# Patient Record
Sex: MALE | Race: WHITE | NOT HISPANIC OR LATINO | Employment: OTHER | URBAN - METROPOLITAN AREA
[De-identification: names, ages, dates, MRNs, and addresses within clinical notes are randomized per-mention and may not be internally consistent; named-entity substitution may affect disease eponyms.]

---

## 2017-01-05 ENCOUNTER — APPOINTMENT (OUTPATIENT)
Dept: LAB | Facility: CLINIC | Age: 57
End: 2017-01-05
Payer: COMMERCIAL

## 2017-01-05 DIAGNOSIS — E78.5 HYPERLIPIDEMIA: ICD-10-CM

## 2017-01-05 LAB
ALBUMIN SERPL BCP-MCNC: 3.9 G/DL (ref 3.5–5)
ALP SERPL-CCNC: 69 U/L (ref 46–116)
ALT SERPL W P-5'-P-CCNC: 39 U/L (ref 12–78)
ANION GAP SERPL CALCULATED.3IONS-SCNC: 6 MMOL/L (ref 4–13)
AST SERPL W P-5'-P-CCNC: 18 U/L (ref 5–45)
BILIRUB SERPL-MCNC: 1.6 MG/DL (ref 0.2–1)
BUN SERPL-MCNC: 20 MG/DL (ref 5–25)
CALCIUM SERPL-MCNC: 8.9 MG/DL (ref 8.3–10.1)
CHLORIDE SERPL-SCNC: 107 MMOL/L (ref 100–108)
CHOLEST SERPL-MCNC: 178 MG/DL (ref 50–200)
CO2 SERPL-SCNC: 28 MMOL/L (ref 21–32)
CREAT SERPL-MCNC: 0.95 MG/DL (ref 0.6–1.3)
GFR SERPL CREATININE-BSD FRML MDRD: >60 ML/MIN/1.73SQ M
GLUCOSE SERPL-MCNC: 100 MG/DL (ref 65–140)
HDLC SERPL-MCNC: 56 MG/DL (ref 40–60)
LDLC SERPL CALC-MCNC: 109 MG/DL (ref 0–100)
POTASSIUM SERPL-SCNC: 3.7 MMOL/L (ref 3.5–5.3)
PROT SERPL-MCNC: 7.2 G/DL (ref 6.4–8.2)
SODIUM SERPL-SCNC: 141 MMOL/L (ref 136–145)
TRIGL SERPL-MCNC: 65 MG/DL

## 2017-01-05 PROCEDURE — 80061 LIPID PANEL: CPT

## 2017-01-05 PROCEDURE — 36415 COLL VENOUS BLD VENIPUNCTURE: CPT

## 2017-01-05 PROCEDURE — 80053 COMPREHEN METABOLIC PANEL: CPT

## 2017-01-17 ENCOUNTER — ALLSCRIPTS OFFICE VISIT (OUTPATIENT)
Dept: OTHER | Facility: OTHER | Age: 57
End: 2017-01-17

## 2017-01-17 DIAGNOSIS — M77.11 LATERAL EPICONDYLITIS OF RIGHT ELBOW: ICD-10-CM

## 2017-01-30 ENCOUNTER — APPOINTMENT (OUTPATIENT)
Dept: PHYSICAL THERAPY | Facility: CLINIC | Age: 57
End: 2017-01-30
Payer: COMMERCIAL

## 2017-01-30 DIAGNOSIS — M77.11 LATERAL EPICONDYLITIS OF RIGHT ELBOW: ICD-10-CM

## 2017-01-30 PROCEDURE — 97161 PT EVAL LOW COMPLEX 20 MIN: CPT

## 2017-01-30 PROCEDURE — 97140 MANUAL THERAPY 1/> REGIONS: CPT

## 2017-02-02 ENCOUNTER — APPOINTMENT (OUTPATIENT)
Dept: PHYSICAL THERAPY | Facility: CLINIC | Age: 57
End: 2017-02-02
Payer: COMMERCIAL

## 2017-02-02 PROCEDURE — 97110 THERAPEUTIC EXERCISES: CPT

## 2017-02-02 PROCEDURE — 97140 MANUAL THERAPY 1/> REGIONS: CPT

## 2017-02-06 ENCOUNTER — APPOINTMENT (OUTPATIENT)
Dept: PHYSICAL THERAPY | Facility: CLINIC | Age: 57
End: 2017-02-06
Payer: COMMERCIAL

## 2017-02-06 PROCEDURE — 97140 MANUAL THERAPY 1/> REGIONS: CPT

## 2017-02-06 PROCEDURE — 97110 THERAPEUTIC EXERCISES: CPT

## 2017-02-07 ENCOUNTER — GENERIC CONVERSION - ENCOUNTER (OUTPATIENT)
Dept: OTHER | Facility: OTHER | Age: 57
End: 2017-02-07

## 2017-02-08 ENCOUNTER — APPOINTMENT (OUTPATIENT)
Dept: PHYSICAL THERAPY | Facility: CLINIC | Age: 57
End: 2017-02-08
Payer: COMMERCIAL

## 2017-02-08 PROCEDURE — 97110 THERAPEUTIC EXERCISES: CPT

## 2017-02-08 PROCEDURE — 97140 MANUAL THERAPY 1/> REGIONS: CPT

## 2017-02-13 ENCOUNTER — APPOINTMENT (OUTPATIENT)
Dept: PHYSICAL THERAPY | Facility: CLINIC | Age: 57
End: 2017-02-13
Payer: COMMERCIAL

## 2017-02-13 PROCEDURE — 97110 THERAPEUTIC EXERCISES: CPT

## 2017-02-13 PROCEDURE — 97140 MANUAL THERAPY 1/> REGIONS: CPT

## 2017-02-16 ENCOUNTER — APPOINTMENT (OUTPATIENT)
Dept: PHYSICAL THERAPY | Facility: CLINIC | Age: 57
End: 2017-02-16
Payer: COMMERCIAL

## 2017-02-16 PROCEDURE — 97140 MANUAL THERAPY 1/> REGIONS: CPT

## 2017-02-16 PROCEDURE — 97110 THERAPEUTIC EXERCISES: CPT

## 2017-02-20 ENCOUNTER — APPOINTMENT (OUTPATIENT)
Dept: PHYSICAL THERAPY | Facility: CLINIC | Age: 57
End: 2017-02-20
Payer: COMMERCIAL

## 2017-02-20 PROCEDURE — 97140 MANUAL THERAPY 1/> REGIONS: CPT

## 2017-02-20 PROCEDURE — 97110 THERAPEUTIC EXERCISES: CPT

## 2017-02-22 ENCOUNTER — APPOINTMENT (OUTPATIENT)
Dept: PHYSICAL THERAPY | Facility: CLINIC | Age: 57
End: 2017-02-22
Payer: COMMERCIAL

## 2017-02-22 PROCEDURE — 97110 THERAPEUTIC EXERCISES: CPT

## 2017-02-22 PROCEDURE — 97140 MANUAL THERAPY 1/> REGIONS: CPT

## 2017-02-27 ENCOUNTER — APPOINTMENT (OUTPATIENT)
Dept: PHYSICAL THERAPY | Facility: CLINIC | Age: 57
End: 2017-02-27
Payer: COMMERCIAL

## 2017-02-27 PROCEDURE — 97110 THERAPEUTIC EXERCISES: CPT

## 2017-02-27 PROCEDURE — 97035 APP MDLTY 1+ULTRASOUND EA 15: CPT

## 2017-02-27 PROCEDURE — 97140 MANUAL THERAPY 1/> REGIONS: CPT

## 2017-03-01 ENCOUNTER — APPOINTMENT (OUTPATIENT)
Dept: PHYSICAL THERAPY | Facility: CLINIC | Age: 57
End: 2017-03-01
Payer: COMMERCIAL

## 2017-03-01 PROCEDURE — 97110 THERAPEUTIC EXERCISES: CPT

## 2017-03-01 PROCEDURE — 97140 MANUAL THERAPY 1/> REGIONS: CPT

## 2017-03-06 ENCOUNTER — APPOINTMENT (OUTPATIENT)
Dept: PHYSICAL THERAPY | Facility: CLINIC | Age: 57
End: 2017-03-06
Payer: COMMERCIAL

## 2017-03-06 PROCEDURE — 97110 THERAPEUTIC EXERCISES: CPT

## 2017-03-06 PROCEDURE — 97140 MANUAL THERAPY 1/> REGIONS: CPT

## 2017-03-08 ENCOUNTER — APPOINTMENT (OUTPATIENT)
Dept: PHYSICAL THERAPY | Facility: CLINIC | Age: 57
End: 2017-03-08
Payer: COMMERCIAL

## 2017-03-08 PROCEDURE — 97140 MANUAL THERAPY 1/> REGIONS: CPT

## 2017-03-08 PROCEDURE — 97110 THERAPEUTIC EXERCISES: CPT

## 2017-03-30 ENCOUNTER — APPOINTMENT (OUTPATIENT)
Dept: LAB | Facility: CLINIC | Age: 57
End: 2017-03-30
Payer: COMMERCIAL

## 2017-03-30 DIAGNOSIS — Z12.5 ENCOUNTER FOR SCREENING FOR MALIGNANT NEOPLASM OF PROSTATE: ICD-10-CM

## 2017-03-30 LAB — PSA SERPL-MCNC: 1.8 NG/ML (ref 0–4)

## 2017-03-30 PROCEDURE — G0103 PSA SCREENING: HCPCS

## 2017-03-30 PROCEDURE — 36415 COLL VENOUS BLD VENIPUNCTURE: CPT

## 2017-04-24 ENCOUNTER — ALLSCRIPTS OFFICE VISIT (OUTPATIENT)
Dept: OTHER | Facility: OTHER | Age: 57
End: 2017-04-24

## 2017-05-25 ENCOUNTER — ALLSCRIPTS OFFICE VISIT (OUTPATIENT)
Dept: OTHER | Facility: OTHER | Age: 57
End: 2017-05-25

## 2017-07-17 DIAGNOSIS — R73.09 OTHER ABNORMAL GLUCOSE: ICD-10-CM

## 2017-07-17 DIAGNOSIS — I10 ESSENTIAL (PRIMARY) HYPERTENSION: ICD-10-CM

## 2017-07-17 DIAGNOSIS — E78.5 HYPERLIPIDEMIA: ICD-10-CM

## 2017-07-17 DIAGNOSIS — R61 GENERALIZED HYPERHIDROSIS: ICD-10-CM

## 2017-07-19 ENCOUNTER — APPOINTMENT (OUTPATIENT)
Dept: LAB | Facility: CLINIC | Age: 57
End: 2017-07-19
Payer: COMMERCIAL

## 2017-07-19 DIAGNOSIS — R73.09 OTHER ABNORMAL GLUCOSE: ICD-10-CM

## 2017-07-19 DIAGNOSIS — I10 ESSENTIAL (PRIMARY) HYPERTENSION: ICD-10-CM

## 2017-07-19 DIAGNOSIS — E78.5 HYPERLIPIDEMIA: ICD-10-CM

## 2017-07-19 LAB
ALBUMIN SERPL BCP-MCNC: 3.8 G/DL (ref 3.5–5)
ALP SERPL-CCNC: 72 U/L (ref 46–116)
ALT SERPL W P-5'-P-CCNC: 33 U/L (ref 12–78)
ANION GAP SERPL CALCULATED.3IONS-SCNC: 6 MMOL/L (ref 4–13)
AST SERPL W P-5'-P-CCNC: 20 U/L (ref 5–45)
BILIRUB SERPL-MCNC: 1.52 MG/DL (ref 0.2–1)
BUN SERPL-MCNC: 20 MG/DL (ref 5–25)
CALCIUM SERPL-MCNC: 8.7 MG/DL (ref 8.3–10.1)
CHLORIDE SERPL-SCNC: 105 MMOL/L (ref 100–108)
CHOLEST SERPL-MCNC: 169 MG/DL (ref 50–200)
CO2 SERPL-SCNC: 29 MMOL/L (ref 21–32)
CREAT SERPL-MCNC: 0.92 MG/DL (ref 0.6–1.3)
EST. AVERAGE GLUCOSE BLD GHB EST-MCNC: 103 MG/DL
GFR SERPL CREATININE-BSD FRML MDRD: >60 ML/MIN/1.73SQ M
GLUCOSE P FAST SERPL-MCNC: 95 MG/DL (ref 65–99)
HBA1C MFR BLD: 5.2 % (ref 4.2–6.3)
HDLC SERPL-MCNC: 49 MG/DL (ref 40–60)
LDLC SERPL CALC-MCNC: 104 MG/DL (ref 0–100)
POTASSIUM SERPL-SCNC: 3.7 MMOL/L (ref 3.5–5.3)
PROT SERPL-MCNC: 7 G/DL (ref 6.4–8.2)
SODIUM SERPL-SCNC: 140 MMOL/L (ref 136–145)
TRIGL SERPL-MCNC: 82 MG/DL

## 2017-07-19 PROCEDURE — 36415 COLL VENOUS BLD VENIPUNCTURE: CPT

## 2017-07-19 PROCEDURE — 80061 LIPID PANEL: CPT

## 2017-07-19 PROCEDURE — 83036 HEMOGLOBIN GLYCOSYLATED A1C: CPT

## 2017-07-19 PROCEDURE — 80053 COMPREHEN METABOLIC PANEL: CPT

## 2017-07-25 ENCOUNTER — ALLSCRIPTS OFFICE VISIT (OUTPATIENT)
Dept: OTHER | Facility: OTHER | Age: 57
End: 2017-07-25

## 2017-10-25 ENCOUNTER — ALLSCRIPTS OFFICE VISIT (OUTPATIENT)
Dept: OTHER | Facility: OTHER | Age: 57
End: 2017-10-25

## 2017-10-26 NOTE — PROGRESS NOTES
Assessment  1  History of malignant melanoma of skin (V10 82) (Z85 820)    Plan  PMH: History of malignant melanoma of skin    · Follow-up PRN Evaluation and Treatment  Follow-up  Status: Complete  Done:  47MGM8570   Ordered; For: PMH: History of malignant melanoma of skin; Ordered By: Isidoro Moore Performed:  Due: 27GIE6687    Discussion/Summary  Discussion Summary:   History of stage IB melanoma, presently 5 years post resection, and without evidence of disease or recurrence  I will therefore see him as needed  He will continue lifelong dermatologic surveillance  Counseling Documentation With Imm: The patient was counseled regarding diagnostic results,-- instructions for management,-- impressions,-- importance of compliance with treatment  Goals and Barriers: The patient has the current Goals: Surveillance  The patent has the current Barriers:   Patient's Capacity to Self-Care: Patient is able to Self-Care  Patient agrees and allows to involve family/caregiver in development of care plan:   Medication SE Review and Pt Understands Tx: The treatment plan was reviewed with the patient/guardian  The patient/guardian understands and agrees with the treatment plan   Understands and agrees with treatment plan: The treatment plan was reviewed with the patient/guardian  The patient/guardian understands and agrees with the treatment plan      Chief Complaint  Chief Complaint Free Text Note Form: Pt here for 6 mo melanoma f/u  No complaints  History of Present Illness  Diagnosis and Staging: Stage Ia melanoma of right elbow History: Wide excision September 2012     Treatment History: Wide excision September 2012   Current Therapy: Observation   Disease Status: QUE   Interval History: Since his last visit here 6 months ago, Mr Raymundo Crawford has had no derm issues to report  He is doing very well, and is 5 years post resection  He has no new complaints        Review of Systems  Complete Male ROS Surg Onc: Constitutional: The patient denies new or recent history of general fatigue, no recent weight loss, no change in appetite  Eyes: No complaints of visual problems, no scleral icterus  ENT: no complaints of ear pain, no hoarseness, no difficulty swallowing,-- no tinnitus-- and-- no new masses in head, oral cavity, or neck  Cardiovascular: No complaints of chest pain, no palpitations, no ankle edema  Respiratory: No complaints of shortness of breath, no cough  Gastrointestinal: No complaints of jaundice, no bloody stools, no pale stools  Genitourinary: No complaints of dysuria, no hematuria, no nocturia, no frequent urination, no urethral discharge  Musculoskeletal: No complaints of weakness, paralysis, joint stiffness or arthralgias,  Integumentary: No complaints of rash, no new lesions  Neurological: No complaints of convulsions, no seizures, no dizziness  Hematologic/Lymphatic: No complaints of easy bruising  ROS Reviewed:   ROS reviewed  Active Problems  1  Abnormal blood chemistry (790 6) (R79 9)   2  Abnormal blood sugar (790 29) (R73 09)   3  Allergic reaction (995 3) (T78 40XA)   4  Benign essential hypertension (401 1) (I10)   5  Benign familial tremor (333 1) (G25 0)   6  Benign Prostatic Hypertrophy Without Urinary Obstruction With Other Lower Urinary Tract   Symptoms (600 00)   7  Biceps tendonitis (726 12) (M75 20)   8  Depression with anxiety (300 4) (F41 8)   9  Elevated bilirubin (277 4) (R17)   10  Epidermal inclusion cyst (706 2) (L72 0)   11  Excessive sweating (780 8) (R61)   12  Fatty liver (571 8) (K76 0)   13  History of colon polyps (V12 72) (Z86 010)   14  History of malignant melanoma of skin (V10 82) (Z85 820)   15  Hyperlipidemia (272 4) (E78 5)   16  Hypokalemia (276 8) (E87 6)   17  Lyme disease (088 81) (A69 20)   18  Need for prophylactic vaccination and inoculation against influenza (V04 81) (Z23)   19  Need for Tdap vaccination (V06 1) (Z23)   20  Neoplasm of skin, benign (216 9) (D23 9)   21  Obstructive sleep apnea (327 23) (G47 33)   22  Oral thrush (112 0) (B37 0)   23  Organic impotence (607 84) (N52 9)   24  Right lateral epicondylitis (726 32) (M77 11)   25  Special screening examination for neoplasm of prostate (V76 44) (Z12 5)   26  Strain of right biceps, initial encounter (840 8) (S46 211A)   27  Tick bite (919 4,E906 4) (W57 XXXA)   28  Tubular adenoma (229 9) (D36 9)    Past Medical History  1  History of Cancer (199 1) (C80 1)   2  History of hypercholesterolemia (V12 29) (Z86 39)   3  History of malignant melanoma of skin (V10 82) (Z85 820)   4  Need for prophylactic vaccination and inoculation against influenza (V04 81) (Z23)    Surgical History  1  History of Colonoscopy (Fiberoptic)   2  History of Excision Of Lesion Upper Extremity Right   · Right Elbow   3  History of Hemorrhoidectomy   4  History of Tonsillectomy With Adenoidectomy  Surgical History Reviewed: The surgical history was reviewed and updated today  Family History  Mother    1  Family history of Diabetes Mellitus (V18 0)   2  Family history of Skin Cancer (V16 8)  Father    3  Family history of Coronary Artery Disease (V17 49)  Family History Reviewed: The family history was reviewed and updated today  Social History   · Being A Social Drinker   · Denied: History of Drug Use   · Never a smoker  Social History Reviewed: The social history was reviewed and updated today  The social history was reviewed and is unchanged  Current Meds   1  AmLODIPine Besylate 5 MG Oral Tablet; one tablet twice a day; Therapy: 38QRN7173 to (Evaluate:92Vjt1130)  Requested for: 32Hjn1507; Last   Rx:77Jxc2550 Ordered   2  EpiPen 2-Sherman 0 3 MG/0 3ML Injection Solution Auto-injector; INJECT 0 3ML   INTRAMUSCULARLY AS DIRECTED; Therapy: 30VVL3995 to (Last Rx:56Gnr1418)  Requested for: 32DGQ2439 Ordered   3   Escitalopram Oxalate 20 MG Oral Tablet; take 1 tablet by mouth daily; Therapy: 11UWL9711 to (Evaluate:76Tgy9113)  Requested for: 73Tqj5890; Last   Rx:51Cts1502 Ordered   4  Rosuvastatin Calcium 5 MG Oral Tablet; Take 1 tablet by mouth daily; Therapy: 34NDH6821 to (Evaluate:29Fjh7067)  Requested for: 01DJL6421; Last   Rx:51Zhr6750 Ordered   5  Vitamin C 1000 MG Oral Tablet; TAKE 1 TABLET DAILY; Therapy: (Recorded:14Apr2015) to Recorded  Medication List Reviewed: The medication list was reviewed and updated today  Allergies  1  CeleBREX CAPS  2  Bee sting    Vitals  Vital Signs    Recorded: 79IRM4621 09:21AM   Temperature 97 9 F   Heart Rate 73   Respiration 14   Systolic 274   Diastolic 88   Height 6 ft 4 in   Weight 232 lb 4 96 oz   BMI Calculated 28 28   BSA Calculated 2 36   O2 Saturation 92     Physical Exam    Constitutional: General appearance: The Patient is well-developed, well-nourished male who appears his stated age in no acute distress  He is pleasant and talkative  HEENT: Conjunctiva and lids: No swelling, erythema, or discharge  -- Sclerae are anicteric  -- External inspection of ears and nose: Normal  -- Otoscopic examination: Tympanic membranes translucent with normal light reflex  Canals patent without erythema  -- Neck is supple without adenopathy  No JVD  Chest: There are bilaterally symmetrical breath sounds  -- The lungs are clear to auscultation  Cardiac: Heart is regular rate  Abdomen: Abdomen is soft, nontender without masses  -- There is no evidence of hepatosplenomegaly  Extremities: There is no clubbing or cyanosis  -- There is no edema  Neuro: Grossly nonfocal  -- Gait is normal  -- The Cranial Nerves II - XII are grossly intact  -- Sensation is intact to light touch  -- Orientation to person, place and time: Normal  -- Mood and affect: Normal     Lymphatic: no evidence of cervical adenopathy bilaterally  -- no evidence of axillary adenopathy bilaterally  Skin: Warm, anicteric  -- well healed right elbow excision site  Health Management  Tubular adenoma   COLONOSCOPY; every 5 years; Last 98VLQ6717; Next Due: 07VLU3807; Active    Future Appointments    Date/Time Provider Specialty Site   01/16/2018 09:30 AM Kasey Jones DO Internal Medicine [facility]   06/06/2018 08:45 AM Audrey Lutz MD Urology [facility] [place]     End of Encounter Meds  1  EpiPen 2-Sherman 0 3 MG/0 3ML Injection Solution Auto-injector; INJECT 0 3ML   INTRAMUSCULARLY AS DIRECTED; Therapy: 06XMI2854 to (Last Rx:94Cko5591)  Requested for: 37SOM2477 Ordered  2  AmLODIPine Besylate 5 MG Oral Tablet; one tablet twice a day; Therapy: 41YBM8394 to (Evaluate:19Ikb1808)  Requested for: 55Uvu3511; Last   Rx:50Zdz7393 Ordered  3  Escitalopram Oxalate 20 MG Oral Tablet (Lexapro); take 1 tablet by mouth daily; Therapy: 85EBJ9795 to (Evaluate:84Vrk2203)  Requested for: 46Yiv2360; Last   Rx:00Pzq7714 Ordered  4  Vitamin C 1000 MG Oral Tablet; TAKE 1 TABLET DAILY; Therapy: (Recorded:99Xkd7293) to Recorded  5  Rosuvastatin Calcium 5 MG Oral Tablet (Crestor); Take 1 tablet by mouth daily;    Therapy: 75RHR2045 to (Yasmine Garces)  Requested for: 06Ljf5897; Last   Rx:38Lar8039 Ordered    Signatures   Electronically signed by : Lon Samuel MD; Oct 25 2017  9:58AM EST                       (Author)

## 2018-01-09 ENCOUNTER — APPOINTMENT (OUTPATIENT)
Dept: LAB | Facility: CLINIC | Age: 58
End: 2018-01-09
Payer: COMMERCIAL

## 2018-01-09 DIAGNOSIS — E78.5 HYPERLIPIDEMIA: ICD-10-CM

## 2018-01-09 DIAGNOSIS — R61 GENERALIZED HYPERHIDROSIS: ICD-10-CM

## 2018-01-09 DIAGNOSIS — K76.0 FATTY (CHANGE OF) LIVER, NOT ELSEWHERE CLASSIFIED: ICD-10-CM

## 2018-01-09 DIAGNOSIS — I10 ESSENTIAL (PRIMARY) HYPERTENSION: ICD-10-CM

## 2018-01-09 DIAGNOSIS — R79.9 ABNORMAL FINDING OF BLOOD CHEMISTRY: ICD-10-CM

## 2018-01-09 DIAGNOSIS — R73.09 OTHER ABNORMAL GLUCOSE: ICD-10-CM

## 2018-01-09 LAB
ALBUMIN SERPL BCP-MCNC: 4 G/DL (ref 3.5–5)
ALP SERPL-CCNC: 98 U/L (ref 46–116)
ALT SERPL W P-5'-P-CCNC: 58 U/L (ref 12–78)
ANION GAP SERPL CALCULATED.3IONS-SCNC: 5 MMOL/L (ref 4–13)
AST SERPL W P-5'-P-CCNC: 27 U/L (ref 5–45)
BILIRUB SERPL-MCNC: 1.62 MG/DL (ref 0.2–1)
BUN SERPL-MCNC: 21 MG/DL (ref 5–25)
CALCIUM SERPL-MCNC: 9.4 MG/DL (ref 8.3–10.1)
CHLORIDE SERPL-SCNC: 104 MMOL/L (ref 100–108)
CHOLEST SERPL-MCNC: 139 MG/DL (ref 50–200)
CO2 SERPL-SCNC: 30 MMOL/L (ref 21–32)
CREAT SERPL-MCNC: 0.92 MG/DL (ref 0.6–1.3)
ERYTHROCYTE [DISTWIDTH] IN BLOOD BY AUTOMATED COUNT: 12.2 % (ref 11.6–15.1)
EST. AVERAGE GLUCOSE BLD GHB EST-MCNC: 105 MG/DL
GFR SERPL CREATININE-BSD FRML MDRD: 91 ML/MIN/1.73SQ M
GLUCOSE P FAST SERPL-MCNC: 97 MG/DL (ref 65–99)
HBA1C MFR BLD: 5.3 % (ref 4.2–6.3)
HCT VFR BLD AUTO: 43.5 % (ref 36.5–49.3)
HDLC SERPL-MCNC: 40 MG/DL (ref 40–60)
HGB BLD-MCNC: 15.7 G/DL (ref 12–17)
LDLC SERPL CALC-MCNC: 78 MG/DL (ref 0–100)
MCH RBC QN AUTO: 31.8 PG (ref 26.8–34.3)
MCHC RBC AUTO-ENTMCNC: 36.1 G/DL (ref 31.4–37.4)
MCV RBC AUTO: 88 FL (ref 82–98)
PLATELET # BLD AUTO: 283 THOUSANDS/UL (ref 149–390)
PMV BLD AUTO: 9.6 FL (ref 8.9–12.7)
POTASSIUM SERPL-SCNC: 4.2 MMOL/L (ref 3.5–5.3)
PROT SERPL-MCNC: 7.7 G/DL (ref 6.4–8.2)
RBC # BLD AUTO: 4.93 MILLION/UL (ref 3.88–5.62)
SODIUM SERPL-SCNC: 139 MMOL/L (ref 136–145)
TRIGL SERPL-MCNC: 107 MG/DL
TSH SERPL DL<=0.05 MIU/L-ACNC: 2.29 UIU/ML (ref 0.36–3.74)
WBC # BLD AUTO: 6.1 THOUSAND/UL (ref 4.31–10.16)

## 2018-01-09 PROCEDURE — 84443 ASSAY THYROID STIM HORMONE: CPT

## 2018-01-09 PROCEDURE — 83036 HEMOGLOBIN GLYCOSYLATED A1C: CPT

## 2018-01-09 PROCEDURE — 36415 COLL VENOUS BLD VENIPUNCTURE: CPT

## 2018-01-09 PROCEDURE — 80053 COMPREHEN METABOLIC PANEL: CPT

## 2018-01-09 PROCEDURE — 85027 COMPLETE CBC AUTOMATED: CPT

## 2018-01-09 PROCEDURE — 80061 LIPID PANEL: CPT

## 2018-01-12 VITALS
RESPIRATION RATE: 16 BRPM | DIASTOLIC BLOOD PRESSURE: 84 MMHG | BODY MASS INDEX: 29.34 KG/M2 | HEART RATE: 84 BPM | SYSTOLIC BLOOD PRESSURE: 142 MMHG | WEIGHT: 236 LBS | HEIGHT: 75 IN

## 2018-01-12 VITALS
RESPIRATION RATE: 16 BRPM | HEART RATE: 86 BPM | TEMPERATURE: 97.1 F | SYSTOLIC BLOOD PRESSURE: 138 MMHG | WEIGHT: 231 LBS | BODY MASS INDEX: 28.72 KG/M2 | HEIGHT: 75 IN | OXYGEN SATURATION: 98 % | DIASTOLIC BLOOD PRESSURE: 78 MMHG

## 2018-01-13 VITALS
WEIGHT: 227 LBS | DIASTOLIC BLOOD PRESSURE: 80 MMHG | HEIGHT: 76 IN | SYSTOLIC BLOOD PRESSURE: 120 MMHG | BODY MASS INDEX: 27.64 KG/M2 | HEART RATE: 72 BPM

## 2018-01-14 VITALS
DIASTOLIC BLOOD PRESSURE: 88 MMHG | OXYGEN SATURATION: 98 % | HEART RATE: 73 BPM | SYSTOLIC BLOOD PRESSURE: 122 MMHG | WEIGHT: 230 LBS | HEIGHT: 76 IN | RESPIRATION RATE: 16 BRPM | BODY MASS INDEX: 28.01 KG/M2

## 2018-01-14 VITALS
SYSTOLIC BLOOD PRESSURE: 130 MMHG | OXYGEN SATURATION: 92 % | HEART RATE: 73 BPM | WEIGHT: 232.31 LBS | BODY MASS INDEX: 28.29 KG/M2 | RESPIRATION RATE: 14 BRPM | DIASTOLIC BLOOD PRESSURE: 88 MMHG | HEIGHT: 76 IN | TEMPERATURE: 97.9 F

## 2018-01-16 ENCOUNTER — ALLSCRIPTS OFFICE VISIT (OUTPATIENT)
Dept: OTHER | Facility: OTHER | Age: 58
End: 2018-01-16

## 2018-01-17 NOTE — PROGRESS NOTES
Assessment   1  Benign essential hypertension (401 1) (I10)   2  Encounter for preventive health examination (V70 0) (Z00 00)   3  Never a smoker   4  Hyperlipidemia (272 4) (E78 5)   5  Depression with anxiety (300 4) (F41 8)   6  Overweight (BMI 25 0-29 9) (278 02) (E66 3)   7  Viral gastroenteritis (008 8) (A08 4)      Assessment and plan 1  Hypertension - controlled, I have counseled patient following healthy imbalance diet, I would like the patient reduce sodium, exercise routinely, I would like the patient continued the med current medical regiment and we will continue to monitor the progress  2   Hyperlipidemia controlled continue with current medical regiment recommend a low-cholesterol diet and recommend routine exercise we will continue to monitor the progress  3   Anxiety and depression stable doing well continue with current dose of SSRI 4  Overweight I have counselled the pt to follow a healthy and balanced diet ,and recommend routine exercise  5   Status post gastroenteritis completely resolved Return to office 6  months  call if any problems       Plan   Abnormal blood sugar, Benign essential hypertension    · (1) HEMOGLOBIN A1C; Status:Active; Requested for:60Yka6183; Benign essential hypertension    · (1) COMPREHENSIVE METABOLIC PANEL; Status:Active; Requested for:31Gad9744;    · (1) LIPID PANEL, FASTING; Status:Active; Requested for:69Cck9947;     Chief Complaint   Chief Complaint Chronic Condition [facility]: Patient is here today for follow up of chronic conditions described in HPI  History of Present Illness   HPI: [age]-year old female coming in for a follow up office visit regarding benign essential hypertension, anxiety and depression, hyperlipidemia and overweight; The patient reports me compliant taking medications without untoward side effects the   The patient is here to review his medical condition, update me on the medical condition and the patient reports me no hospitalizations and no ER visits  He reports me he had a viral gastroenteritis for several days and the symptoms did resolve he the fluids for 24 hours his symptoms resolved      Review of Systems   Complete-Male:      Constitutional: No fever or chills, feels well, no tiredness, no recent weight gain or weight loss  Cardiovascular: No complaints of slow heart rate, no fast heart rate, no chest pain, no palpitations, no leg claudication, no lower extremity  Respiratory: No complaints of shortness of breath, no wheezing, no cough, no SOB on exertion, no orthopnea or PND  Gastrointestinal: No complaints of abdominal pain, no constipation, no nausea or vomiting, no diarrhea or bloody stools  Genitourinary: No complaints of dysuria, no incontinence, no hesitancy, no nocturia, no genital lesion, no testicular pain  Psychiatric: no anxiety-- and-- no depression  ROS Reviewed:    ROS reviewed  Active Problems   1  Abnormal blood chemistry (790 6) (R79 9)   2  Abnormal blood sugar (790 29) (R73 09)   3  Allergic reaction (995 3) (T78 40XA)   4  Benign essential hypertension (401 1) (I10)   5  Benign familial tremor (333 1) (G25 0)   6  Benign Prostatic Hypertrophy Without Urinary Obstruction With Other Lower Urinary Tract     Symptoms (600 00)   7  Biceps tendonitis (726 12) (M75 20)   8  Depression with anxiety (300 4) (F41 8)   9  Elevated bilirubin (277 4) (R17)   10  Epidermal inclusion cyst (706 2) (L72 0)   11  Excessive sweating (780 8) (R61)   12  Fatty liver (571 8) (K76 0)   13  History of colon polyps (V12 72) (Z86 010)   14  History of malignant melanoma of skin (V10 82) (Z85 820)   15  Hyperlipidemia (272 4) (E78 5)   16  Hypokalemia (276 8) (E87 6)   17  Lyme disease (088 81) (A69 20)   18  Need for prophylactic vaccination and inoculation against influenza (V04 81) (Z23)   19  Need for Tdap vaccination (V06 1) (Z23)   20  Neoplasm of skin, benign (216 9) (D23 9)   21  Obstructive sleep apnea (327 23) (G47 33)   22  Oral thrush (112 0) (B37 0)   23  Organic impotence (607 84) (N52 9)   24  Right lateral epicondylitis (726 32) (M77 11)   25  Special screening examination for neoplasm of prostate (V76 44) (Z12 5)   26  Strain of right biceps, initial encounter (840 8) (S46 211A)   27  Tick bite (919 4,E906 4) (W57 XXXA)   28  Tubular adenoma (229 9) (D36 9)    Past Medical History   1  History of Cancer (199 1) (C80 1)   2  History of hypercholesterolemia (V12 29) (Z86 39)   3  History of malignant melanoma of skin (V10 82) (Z85 820)   4  Need for prophylactic vaccination and inoculation against influenza (V04 81) (Z23)  Active Problems And Past Medical History Reviewed: The active problems and past medical history were reviewed and updated today  Surgical History   1  History of Colonoscopy (Fiberoptic)   2  History of Excision Of Lesion Upper Extremity Right   3  History of Hemorrhoidectomy   4  History of Tonsillectomy With Adenoidectomy  Surgical History Reviewed: The surgical history was reviewed and updated today  Family History   Mother    1  Family history of Diabetes Mellitus (V18 0)   2  Family history of Skin Cancer (V16 8)  Father    3  Family history of Coronary Artery Disease (V17 49)  Family History Reviewed: The family history was reviewed and updated today  Social History    · Being A Social Drinker   · Denied: History of Drug Use   · Never a smoker  Social History Reviewed: The social history was reviewed and updated today  The social history was reviewed and is unchanged  Current Meds    1  AmLODIPine Besylate 5 MG Oral Tablet; one tablet twice a day; Therapy: 27UEH5761 to (Evaluate:82Phs1927)  Requested for: 61Xdc7898; Last Rx:23Nmc7730 Ordered   2  EpiPen 2-Sherman 0 3 MG/0 3ML Injection Solution Auto-injector; INJECT 0 3ML INTRAMUSCULARLY AS     DIRECTED;      Therapy: 76FCQ8947 to (Last Rx:53Cvi8882)  Requested for: 02TLM3233 Ordered   3  Escitalopram Oxalate 20 MG Oral Tablet; take 1 tablet by mouth daily; Therapy: 51XWJ7500 to (Evaluate:59Hkk7050)  Requested for: 90Bij7325; Last Rx:51Wou0038 Ordered   4  Rosuvastatin Calcium 5 MG Oral Tablet; Take 1 tablet by mouth daily; Therapy: 79EJS2817 to (Evaluate:52Mue8842)  Requested for: 51YZK4544; Last Rx:61Axt0218 Ordered   5  Vitamin C 1000 MG Oral Tablet; TAKE 1 TABLET DAILY; Therapy: (Recorded:85Usy6712) to Recorded  Medication List Reviewed: The medication list was reviewed and updated today  Allergies   1  CeleBREX CAPS  2  Bee sting    Vitals   Vital Signs    Recorded: 62VZF0162 09:31AM   Heart Rate 69   Respiration 16   Systolic 937, RUE, Sitting   Diastolic 84, RUE, Sitting   Height 6 ft 4 in   Weight 232 lb 0 8 oz   BMI Calculated 28 25   BSA Calculated 2 36   O2 Saturation 99     Physical Exam        Constitutional      General appearance: No acute distress, well appearing and well nourished  Eyes      Conjunctiva and lids: No swelling, erythema, or discharge  Pupils and irises: Equal, round and reactive to light  Ears, Nose, Mouth, and Throat      External inspection of ears and nose: Normal        Otoscopic examination: Tympanic membrance translucent with normal light reflex  Canals patent without erythema  Nasal mucosa, septum, and turbinates: Normal without edema or erythema  Oropharynx: Normal with no erythema, edema, exudate or lesions  Pulmonary      Respiratory effort: No increased work of breathing or signs of respiratory distress  Auscultation of lungs: Clear to auscultation, equal breath sounds bilaterally, no wheezes, no rales, no rhonci  Cardiovascular      Auscultation of heart: Normal rate and rhythm, normal S1 and S2, without murmurs  Examination of extremities for edema and/or varicosities: Normal        Lymphatic      Palpation of lymph nodes in neck: No lymphadenopathy  Musculoskeletal      Inspection/palpation of joints, bones, and muscles: Normal        Psychiatric      Mood and affect: Normal           Results/Data   PHQ-9 Adult Depression Screening 02WYD0948 09:34AM User, Ahs      Test Name Result Flag Reference   PHQ-9 Adult Depression Score 0     Over the last two weeks, how often have you been bothered by any of the following problems? Little interest or pleasure in doing things: Not at all - 0     Feeling down, depressed, or hopeless: Not at all - 0     Trouble falling or staying asleep, or sleeping too much: Not at all - 0     Feeling tired or having little energy: Not at all - 0     Poor appetite or over eating: Not at all - 0     Feeling bad about yourself - or that you are a failure or have let yourself or your family down: Not at all - 0     Trouble concentrating on things, such as reading the newspaper or watching television: Not at all - 0     Moving or speaking so slowly that other people could have noticed   Or the opposite -  being so fidgety or restless that you have been moving around a lot more than usual: Not at all - 0     Thoughts that you would be better off dead, or of hurting yourself in some way: Not at all - 0   PHQ-9 Adult Depression Screening Negative     PHQ-9 Difficulty Level Not difficult at all     PHQ-9 Severity No Depression        (1) COMPREHENSIVE METABOLIC PANEL 17GOD7603 00:44CB Howard Henry Order Number: [id number]      Test Name Result Flag Reference   SODIUM 139 mmol/L  136-145   POTASSIUM 4 2 mmol/L  3 5-5 3   CHLORIDE 104 mmol/L  100-108   CARBON DIOXIDE 30 mmol/L  21-32   ANION GAP (CALC) 5 mmol/L  4-13   BLOOD UREA NITROGEN 21 mg/dL  5-25   CREATININE 0 92 mg/dL  0 60-1 30   Standardized to IDMS reference method   CALCIUM 9 4 mg/dL  8 3-10 1   BILI, TOTAL 1 62 mg/dL H 0 20-1 00   ALK PHOSPHATAS 98 U/L     ALT (SGPT) 58 U/L  12-78   Specimen collection should occur prior to Sulfasalazine and/or Sulfapyridine administration due to the potential for falsely depressed results  AST(SGOT) 27 U/L  5-45   Specimen collection should occur prior to Sulfasalazine administration due to the potential for falsely depressed results  ALBUMIN 4 0 g/dL  3 5-5 0   TOTAL PROTEIN 7 7 g/dL  6 4-8 2   eGFR 91 ml/min/1 73sq m     National Kidney Disease Education Program recommendations are as follows:     GFR calculation is accurate only with a steady state creatinine     Chronic Kidney disease less than 60 ml/min/1 73 sq  meters     Kidney failure less than 15 ml/min/1 73 sq  meters  GLUCOSE FASTING 97 mg/dL  65-99   Specimen collection should occur prior to Sulfasalazine administration due to the potential for falsely depressed results  Specimen collection should occur prior to Sulfapyridine administration due to the potential for falsely elevated results  (1) HEMOGLOBIN A1C 21QST3786 09:23AM [employer] Order Number: [id number]      Test Name Result Flag Reference   HEMOGLOBIN A1C 5 3 %  4 2-6 3   EST  AVG  GLUCOSE 105 mg/dl        (1) LIPID PANEL, FASTING 84DXV9031 09:23AM [employer] Order Number: [id number]      Test Name Result Flag Reference   CHOLESTEROL 139 mg/dL     Cholesterol:       Desirable <200 mg/dl       Borderline 200-239 mg/dl       High>239   HDL,DIRECT 40 mg/dL  40-60   HDL Cholesterol:       High>59 mg/dL       Low <41 mg/dL   LDL CHOLESTEROL CALCULATED 78 mg/dL  0-100   This screening LDL is a calculated result  It does not have the accuracy of the Direct Measured LDL in the monitoring of patients with hyperlipidemia and/or statin therapy  Direct Measure LDL (LLO879) must be ordered separately in these patients                 Triglyceride:           Normal <150 mg/dl      Borderline High 150-199 mg/dl      High 200-499 mg/dl      Very High >499 mg/dl   TRIGLYCERIDES 107 mg/dL  <=150   Specimen collection should occur prior to N-Acetylcysteine or Metamizole administration due to the potential for falsely depressed results  (1) TSH WITH FT4 REFLEX 39JYU4998 09:23AM Oliva DURON Order Number: [id number]      Test Name Result Flag Reference   TSH 2 290 uIU/mL  0 358-3 740   Patients undergoing fluorescein dye angiography may retain small amounts of fluorescein in the body for 48-72 hours post procedure  Samples containing fluorescein can produce falsely depressed TSH values  If the patient had this procedure,a specimen should be resubmitted post fluorescein clearance  (1) CBC/ PLT (NO DIFF) 61YXU1355 09:23AM Tyler Caul Order Number: [id number]      Test Name Result Flag Reference   HEMATOCRIT 43 5 %  36 5-49 3   HEMOGLOBIN 15 7 g/dL  12 0-17 0   MCHC 36 1 g/dL  31 4-37 4   MCH 31 8 pg  26 8-34 3   MCV 88 fL  82-98   PLATELET COUNT 468 Thousands/uL  149-390   RBC COUNT 4 93 Million/uL  3 88-5 62   RDW 12 2 %  11 6-15 1   WBC COUNT 6 10 Thousand/uL  4 31-10 16   MPV 9 6 fL  8 9-12 7      Health Management   Tubular adenoma   COLONOSCOPY; every 5 years; Last 52CBC2910; Next Due: 72DKP3877;  Active    Future Appointments      Date/Time Provider Specialty Site   06/06/2018 08:45 AM Felipe Bernstein MD Urology [address]     Signatures    Electronically signed by : Sebastián Burk DO; Jan 16 2018  9:16PM EST                       (Author)

## 2018-01-22 VITALS
HEIGHT: 76 IN | HEART RATE: 69 BPM | BODY MASS INDEX: 28.26 KG/M2 | OXYGEN SATURATION: 99 % | SYSTOLIC BLOOD PRESSURE: 134 MMHG | DIASTOLIC BLOOD PRESSURE: 84 MMHG | RESPIRATION RATE: 16 BRPM | WEIGHT: 232.05 LBS

## 2018-01-23 NOTE — PROGRESS NOTES
Assessment    1  Encounter for preventive health examination (V70 0) (Z00 00)   2  Never a smoker   3  Benign essential hypertension (401 1) (I10)   4  Hyperlipidemia (272 4) (E78 5)    Assessment and plan 1  Health maintenance annual wellness examination overall the patient is clinically stable and doing well, we encouraged the patient to follow a healthy and balanced diet  We recommend that the patient exercise routinely approximately 30 minutes 5 times per week   We have reviewed the patient's vaccines and have made recommendations for updates if necessary the patient will consider a shingles vaccine when he turns 58  We will be ordering screening laboratories which are age appropriate  He is up-to-date on colonoscopy and PSA Return to the office in 6 months call if any problems  Plan  Abnormal blood sugar, Benign essential hypertension    · (1) HEMOGLOBIN A1C; Status:Active; Requested for:66Uzw6281; Benign essential hypertension    · (1) COMPREHENSIVE METABOLIC PANEL; Status:Active; Requested for:60Fdr4339;    · (1) LIPID PANEL, FASTING; Status:Active; Requested for:61Dpy1673;     Chief Complaint  Patient is here for a yearly wellness exam       History of Present Illness  HM, Adult Male: The patient is being seen for a health maintenance evaluation  Social History: Household members include spouse  He is   Work status: not currently employed and retired  The patient has never smoked cigarettes  He reports rare alcohol use and drinking 8-9 drinks per month  The patient has no concerns about alcohol abuse  He has never used illicit drugs  General Health: The patient's health since the last visit is described as good  He has regular dental visits  The patient brushes 2 time(s) a day, doesn't floss his teeth and reports his last dental visit was 8/2017  He complains of vision problems  Vision care includes wearing reading glasses and an eye examination within the last year   He denies hearing loss  Immunizations status: up to date  Lifestyle:  He consumes a diverse and healthy diet  Dietary details include 1 cans of regular soda per day  He does not have any weight concerns  He exercises regularly  He exercises 6-7 times per week  Exercise includes walking, running/jogging and strength training  He does not use tobacco  He denies alcohol use  He denies drug use  Reproductive health:  the patient is sexually active  Screening: Prostate cancer screening includes prostate-specific antigen testing last year  Colorectal cancer screening includes a colonoscopy within the past ten years  Safety elements used: seat belt, safe driving habits, smoke detector, carbon monoxide detector and CPR training for household members, but no CPR training for the patient  Risk findings: no passive smoke exposure, no guns at home, no anxiety symptoms, no depression symptoms, no travel to developing areas and no tuberculosis exposure  Active Problems    1  Abnormal blood chemistry (790 6) (R79 9)   2  Abnormal blood sugar (790 29) (R73 09)   3  Allergic reaction (995 3) (T78 40XA)   4  Benign essential hypertension (401 1) (I10)   5  Benign familial tremor (333 1) (G25 0)   6  Benign Prostatic Hypertrophy Without Urinary Obstruction With Other Lower Urinary Tract   Symptoms (600 00)   7  Biceps tendonitis (726 12) (M75 20)   8  Depression with anxiety (300 4) (F41 8)   9  Elevated bilirubin (277 4) (R17)   10  Epidermal inclusion cyst (706 2) (L72 0)   11  Excessive sweating (780 8) (R61)   12  Fatty liver (571 8) (K76 0)   13  History of colon polyps (V12 72) (Z86 010)   14  History of malignant melanoma of skin (V10 82) (Z85 820)   15  Hyperlipidemia (272 4) (E78 5)   16  Hypokalemia (276 8) (E87 6)   17  Lyme disease (088 81) (A69 20)   18  Need for prophylactic vaccination and inoculation against influenza (V04 81) (Z23)   19  Need for Tdap vaccination (V06 1) (Z23)   20   Neoplasm of skin, benign (216 9) (D23 9)   21  Obstructive sleep apnea (327 23) (G47 33)   22  Oral thrush (112 0) (B37 0)   23  Organic impotence (607 84) (N52 9)   24  Right lateral epicondylitis (726 32) (M77 11)   25  Special screening examination for neoplasm of prostate (V76 44) (Z12 5)   26  Strain of right biceps, initial encounter (840 8) (S46 211A)   27  Tick bite (919 4,E906 4) (W57 XXXA)   28  Tubular adenoma (229 9) (D36 9)    Past Medical History    · History of Cancer (199 1) (C80 1)   · History of hypercholesterolemia (V12 29) (Z86 39)   · History of malignant melanoma of skin (V10 82) (Z85 820)   · Need for prophylactic vaccination and inoculation against influenza (V04 81) (Z23)    Surgical History    · History of Colonoscopy (Fiberoptic)   · History of Excision Of Lesion Upper Extremity Right   · History of Hemorrhoidectomy   · History of Tonsillectomy With Adenoidectomy    Family History  Mother    · Family history of Diabetes Mellitus (V18 0)   · Family history of Skin Cancer (V16 8)  Father    · Family history of Coronary Artery Disease (V17 49)    Social History    · Being A Social Drinker   · Denied: History of Drug Use   · Never a smoker    Current Meds   1  AmLODIPine Besylate 5 MG Oral Tablet; one tablet twice a day; Therapy: 69MPL7023 to (Evaluate:65Knj4108)  Requested for: 92Ams8521; Last   Rx:52Wdo2139 Ordered   2  EpiPen 2-Sherman 0 3 MG/0 3ML Injection Solution Auto-injector; INJECT 0 3ML   INTRAMUSCULARLY AS DIRECTED; Therapy: 28FLY0047 to (Last Rx:37Hmb2733)  Requested for: 55HTX3382 Ordered   3  Escitalopram Oxalate 20 MG Oral Tablet; take 1 tablet by mouth daily; Therapy: 37WTD9881 to (Evaluate:06Mqw0713)  Requested for: 30Svy9558; Last   Rx:15Sox6507 Ordered   4  Rosuvastatin Calcium 5 MG Oral Tablet; Take 1 tablet by mouth daily; Therapy: 16GUU4539 to (Evaluate:45Kiy6163)  Requested for: 58IQN0720; Last   Rx:14Rrx4735 Ordered   5  Vitamin C 1000 MG Oral Tablet; TAKE 1 TABLET DAILY;    Therapy: (Recorded:25Uxq7465) to Recorded    Allergies    1  CeleBREX CAPS    2  Bee sting    Vitals   Recorded: 73NDZ2547 09:31AM   Heart Rate 69   Respiration 16   Systolic 260, RUE, Sitting   Diastolic 84, RUE, Sitting   Height 6 ft 4 in   Weight 232 lb 0 8 oz   BMI Calculated 28 25   BSA Calculated 2 36   O2 Saturation 99     Physical Exam    Constitutional   General appearance: No acute distress, well appearing and well nourished  Head and Face   Head and face: Normal     Eyes   Conjunctiva and lids: No erythema, swelling or discharge  Pupils and irises: Equal, round, reactive to light  Ears, Nose, Mouth, and Throat   External inspection of ears and nose: Normal     Otoscopic examination: Tympanic membranes translucent with normal light reflex  Canals patent without erythema  Hearing: Normal     Nasal mucosa, septum, and turbinates: Normal without edema or erythema  Lips, teeth, and gums: Normal, good dentition  Oropharynx: Normal with no erythema, edema, exudate or lesions  Neck   Neck: Supple, symmetric, trachea midline, no masses  Pulmonary   Respiratory effort: No increased work of breathing or signs of respiratory distress  Auscultation of lungs: Clear to auscultation  Cardiovascular   Auscultation of heart: Normal rate and rhythm, normal S1 and S2, no murmurs  Pedal pulses: 2+ bilaterally  Examination of extremities for edema and/or varicosities: Normal     Abdomen   Abdomen: Non-tender, no masses  Liver and spleen: No hepatomegaly or splenomegaly  Psychiatric   Mood and affect: Normal        Results/Data  PHQ-9 Adult Depression Screening 45FSR9391 09:34AM User, s     Test Name Result Flag Reference   PHQ-9 Adult Depression Score 0     Over the last two weeks, how often have you been bothered by any of the following problems?   Little interest or pleasure in doing things: Not at all - 0  Feeling down, depressed, or hopeless: Not at all - 0  Trouble falling or staying asleep, or sleeping too much: Not at all - 0  Feeling tired or having little energy: Not at all - 0  Poor appetite or over eating: Not at all - 0  Feeling bad about yourself - or that you are a failure or have let yourself or your family down: Not at all - 0  Trouble concentrating on things, such as reading the newspaper or watching television: Not at all - 0  Moving or speaking so slowly that other people could have noticed  Or the opposite -  being so fidgety or restless that you have been moving around a lot more than usual: Not at all - 0  Thoughts that you would be better off dead, or of hurting yourself in some way: Not at all - 0   PHQ-9 Adult Depression Screening Negative     PHQ-9 Difficulty Level Not difficult at all     PHQ-9 Severity No Depression         Health Management  Tubular adenoma   COLONOSCOPY; every 5 years; Last 26TRC6705; Next Due: 24YZY4224;  Active    Future Appointments    Date/Time Provider Specialty Site   06/06/2018 08:45 AM My Coffey MD Urology [address]     Signatures   Electronically signed by : Funmilayo Hill DO; Jan 16 2018  8:43PM EST                       (Author)

## 2018-01-25 DIAGNOSIS — I10 ESSENTIAL (PRIMARY) HYPERTENSION: ICD-10-CM

## 2018-01-25 DIAGNOSIS — R73.09 OTHER ABNORMAL GLUCOSE: ICD-10-CM

## 2018-01-25 DIAGNOSIS — R79.9 ABNORMAL FINDING OF BLOOD CHEMISTRY: ICD-10-CM

## 2018-01-25 DIAGNOSIS — E78.5 HYPERLIPIDEMIA: ICD-10-CM

## 2018-01-25 DIAGNOSIS — K76.0 FATTY (CHANGE OF) LIVER, NOT ELSEWHERE CLASSIFIED: ICD-10-CM

## 2018-03-19 DIAGNOSIS — E78.5 HYPERLIPIDEMIA, UNSPECIFIED HYPERLIPIDEMIA TYPE: ICD-10-CM

## 2018-03-19 DIAGNOSIS — I10 ESSENTIAL HYPERTENSION: Primary | ICD-10-CM

## 2018-03-19 DIAGNOSIS — F41.9 ANXIETY: ICD-10-CM

## 2018-03-19 RX ORDER — AMLODIPINE BESYLATE 5 MG/1
1 TABLET ORAL 2 TIMES DAILY
COMMUNITY
Start: 2012-10-31 | End: 2018-03-19 | Stop reason: SDUPTHER

## 2018-03-19 RX ORDER — MULTIVIT WITH MINERALS/LUTEIN
1 TABLET ORAL DAILY
COMMUNITY

## 2018-03-19 RX ORDER — ESCITALOPRAM OXALATE 20 MG/1
1 TABLET ORAL DAILY
COMMUNITY
Start: 2012-10-31 | End: 2018-03-19 | Stop reason: SDUPTHER

## 2018-03-19 RX ORDER — EPINEPHRINE 0.3 MG/.3ML
0.3 INJECTION SUBCUTANEOUS
COMMUNITY
Start: 2016-07-15 | End: 2018-10-03 | Stop reason: SDUPTHER

## 2018-03-19 RX ORDER — ROSUVASTATIN CALCIUM 5 MG/1
1 TABLET, COATED ORAL DAILY
COMMUNITY
Start: 2014-10-29 | End: 2018-03-19 | Stop reason: SDUPTHER

## 2018-03-23 RX ORDER — AMLODIPINE BESYLATE 5 MG/1
5 TABLET ORAL 2 TIMES DAILY
Qty: 180 TABLET | Refills: 1 | Status: SHIPPED | OUTPATIENT
Start: 2018-03-23 | End: 2018-07-24 | Stop reason: SDUPTHER

## 2018-03-23 RX ORDER — ESCITALOPRAM OXALATE 20 MG/1
20 TABLET ORAL DAILY
Qty: 90 TABLET | Refills: 1 | Status: SHIPPED | OUTPATIENT
Start: 2018-03-23 | End: 2018-07-24 | Stop reason: SDUPTHER

## 2018-03-23 RX ORDER — ROSUVASTATIN CALCIUM 5 MG/1
5 TABLET, COATED ORAL DAILY
Qty: 90 TABLET | Refills: 1 | Status: SHIPPED | OUTPATIENT
Start: 2018-03-23 | End: 2018-07-24 | Stop reason: SDUPTHER

## 2018-05-22 ENCOUNTER — APPOINTMENT (OUTPATIENT)
Dept: LAB | Facility: CLINIC | Age: 58
End: 2018-05-22
Payer: COMMERCIAL

## 2018-05-22 DIAGNOSIS — Z12.5 ENCOUNTER FOR SCREENING FOR MALIGNANT NEOPLASM OF PROSTATE: ICD-10-CM

## 2018-05-22 LAB — PSA SERPL-MCNC: 1.7 NG/ML (ref 0–4)

## 2018-05-22 PROCEDURE — 36415 COLL VENOUS BLD VENIPUNCTURE: CPT

## 2018-05-22 PROCEDURE — G0103 PSA SCREENING: HCPCS

## 2018-05-25 DIAGNOSIS — Z12.5 ENCOUNTER FOR SCREENING FOR MALIGNANT NEOPLASM OF PROSTATE: ICD-10-CM

## 2018-06-06 ENCOUNTER — OFFICE VISIT (OUTPATIENT)
Dept: UROLOGY | Facility: AMBULATORY SURGERY CENTER | Age: 58
End: 2018-06-06
Payer: COMMERCIAL

## 2018-06-06 VITALS
HEART RATE: 80 BPM | BODY MASS INDEX: 28.49 KG/M2 | DIASTOLIC BLOOD PRESSURE: 88 MMHG | HEIGHT: 76 IN | SYSTOLIC BLOOD PRESSURE: 140 MMHG | WEIGHT: 234 LBS

## 2018-06-06 DIAGNOSIS — N13.8 BPH WITH OBSTRUCTION/LOWER URINARY TRACT SYMPTOMS: ICD-10-CM

## 2018-06-06 DIAGNOSIS — N40.1 BPH WITH OBSTRUCTION/LOWER URINARY TRACT SYMPTOMS: ICD-10-CM

## 2018-06-06 DIAGNOSIS — Z12.5 SCREENING FOR PROSTATE CANCER: Primary | ICD-10-CM

## 2018-06-06 PROCEDURE — 99213 OFFICE O/P EST LOW 20 MIN: CPT | Performed by: NURSE PRACTITIONER

## 2018-06-06 NOTE — PROGRESS NOTES
6/6/2018    Brisa Aviles  1960  [id number]        Assessment  Prostate cancer screening    Discussion  Mel Perez is a 59 y o  male being managed by Humberto Nielsen  His PSA remains stable at 1 7, previously 1 8  He is doing well with no major urinary complaints  We discussed that if symptoms worsened he should notify our office  He will return in 1 year for follow up with a PSA  All questions answered  History of Present Illness  59 y o  male presents today for his annual prostate cancer screening  He denies any lower urinary tract symptoms except recently he has had occasional incomplete bladder emptying sensation  He has a good stream  He denies any dysuria or gross hematuria  He also had an episode of left lower abdominal pain that lasted for about 9 hours  This occurred 2 to 3 weeks ago  He denies any pain since  He denies any other complaints during this time  He does not have a history of kidney stones  Review of Systems  Review of Systems   Constitutional: Negative  HENT: Negative  Respiratory: Negative  Cardiovascular: Negative  Gastrointestinal: Negative  Genitourinary:        As per HPI   Musculoskeletal: Negative  Skin: Negative  Neurological: Negative  Hematological: Negative  Urinary Incontinence Screening      Most Recent Value   Urinary Incontinence   Urinary Incontinence? No   Incomplete emptying? Yes [periodically]   Urinary frequency? No   Urinary urgency? No   Urinary hesitancy? No   Dysuria (painful difficult urination)? No   Nocturia (waking up to use the bathroom)? No   Straining (having to push to go)? No   Weak stream?  No   Intermittent stream?  No   Post void dribbling? No        AUA SYMPTOM SCORE      Most Recent Value   AUA SYMPTOM SCORE   How often have you had a sensation of not emptying your bladder completely after you finished urinating?   2   How often have you had to urinate again less than two hours after you finished urinating? 2   How often have you found you stopped and started again several times when you urinate? 1   How often have you found it difficult to postpone urination? 1   How often have you had a weak urinary stream?  0   How often have you had to push or strain to begin urination? 0   How many times did you most typically get up to urinate from the time you went to bed at night until the time you got up in the morning? 2   Quality of Life: If you were to spend the rest of your life with your urinary condition just the way it is now, how would you feel about that?  1   AUA SYMPTOM SCORE  8            Past Medical History  Past Medical History:   Diagnosis Date    [facility] [address] [facility]     Hyperlipidemia     Malignant melanoma of skin ([facility] [address] )     last assessed 10/25/17, resolved 4/20/15       Past Surgical History  Past Surgical History:   Procedure Laterality Date    COLONOSCOPY  2012    Dr Nate Dupont 1/12/15    HEMORROIDECTOMY      SKIN LESION EXCISION Right     Elbow     TONSILECTOMY AND ADNOIDECTOMY          Past Family History  Family History   Problem Relation Age of Onset    Diabetes Mother     Skin cancer Mother     Coronary artery disease Father        Past Social history  Social History     Social History    Marital status: /Civil Union     Spouse name: N/A    Number of children: N/A    Years of education: N/A     Occupational History    Not on file       Social History Main Topics    Smoking status: Never Smoker    Smokeless tobacco: Never Used    Alcohol use Yes      Comment: Social     Drug use: No    Sexual activity: Not on file     Other Topics Concern    Not on file     Social History Narrative    No narrative on file       Current Medications  Current Outpatient Prescriptions   Medication Sig Dispense Refill    amLODIPine (NORVASC) 5 mg tablet Take 1 tablet (5 mg total) by mouth 2 (two) times a day 180 tablet 1    Ascorbic Acid (VITAMIN C) 1000 MG tablet Take 1 tablet by mouth daily      EPINEPHrine (EPIPEN 2-NICOLE) 0 3 mg/0 3 mL SOAJ Inject 0 3 mL as directed      escitalopram (LEXAPRO) 20 mg tablet Take 1 tablet (20 mg total) by mouth daily 90 tablet 1    rosuvastatin (CRESTOR) 5 mg tablet Take 1 tablet (5 mg total) by mouth daily 90 tablet 1     No current facility-administered medications for this visit  Allergies  Allergies   Allergen Reactions    Bee Venom Edema    Celecoxib Hives     Reaction Date: 21Nov2011; Category: Allergy; Past Medical History, Social History, Family History, medications and allergies were reviewed and updated as appropriate  Vitals  Vitals:    06/06/18 1339   BP: 140/88   Pulse: 80   Weight: 106 kg (234 lb)   Height: 6' 4" (1 93 m)       Physical Exam  Skin: warm, dry, intact  Pulmonary: Non-labored breathing  Abdomen: Soft, non-tender, non-distended  Musculoskeletal: AROM with no joint deformity or tenderness  Neurology: Alert and oriented      (Male):   MICHELLE: Prostate is enlarged at 45 grams  No nodules noted          Results  Lab Results   Component Value Date    PSA 1 7 05/22/2018    PSA 1 8 03/30/2017    PSA 1 1 03/29/2016     Lab Results   Component Value Date    GLUCOSE 100 01/05/2017    CALCIUM 9 4 01/09/2018     01/09/2018    K 4 2 01/09/2018    CO2 30 01/09/2018     01/09/2018    BUN 21 01/09/2018    CREATININE 0 92 01/09/2018     Lab Results   Component Value Date    WBC 6 10 01/09/2018    HGB 15 7 01/09/2018    HCT 43 5 01/09/2018    MCV 88 01/09/2018     01/09/2018

## 2018-07-13 ENCOUNTER — APPOINTMENT (OUTPATIENT)
Dept: LAB | Facility: CLINIC | Age: 58
End: 2018-07-13
Payer: COMMERCIAL

## 2018-07-13 DIAGNOSIS — R73.09 OTHER ABNORMAL GLUCOSE: ICD-10-CM

## 2018-07-13 DIAGNOSIS — I10 ESSENTIAL (PRIMARY) HYPERTENSION: ICD-10-CM

## 2018-07-13 LAB
ALBUMIN SERPL BCP-MCNC: 4.2 G/DL (ref 3.5–5)
ALP SERPL-CCNC: 70 U/L (ref 46–116)
ALT SERPL W P-5'-P-CCNC: 32 U/L (ref 12–78)
ANION GAP SERPL CALCULATED.3IONS-SCNC: 9 MMOL/L (ref 4–13)
AST SERPL W P-5'-P-CCNC: 20 U/L (ref 5–45)
BILIRUB SERPL-MCNC: 1.86 MG/DL (ref 0.2–1)
BUN SERPL-MCNC: 25 MG/DL (ref 5–25)
CALCIUM SERPL-MCNC: 9.2 MG/DL (ref 8.3–10.1)
CHLORIDE SERPL-SCNC: 105 MMOL/L (ref 100–108)
CHOLEST SERPL-MCNC: 169 MG/DL (ref 50–200)
CO2 SERPL-SCNC: 26 MMOL/L (ref 21–32)
CREAT SERPL-MCNC: 1.31 MG/DL (ref 0.6–1.3)
EST. AVERAGE GLUCOSE BLD GHB EST-MCNC: 100 MG/DL
GFR SERPL CREATININE-BSD FRML MDRD: 59 ML/MIN/1.73SQ M
GLUCOSE P FAST SERPL-MCNC: 91 MG/DL (ref 65–99)
HBA1C MFR BLD: 5.1 % (ref 4.2–6.3)
HDLC SERPL-MCNC: 47 MG/DL (ref 40–60)
LDLC SERPL CALC-MCNC: 95 MG/DL (ref 0–100)
NONHDLC SERPL-MCNC: 122 MG/DL
POTASSIUM SERPL-SCNC: 3.7 MMOL/L (ref 3.5–5.3)
PROT SERPL-MCNC: 7.7 G/DL (ref 6.4–8.2)
SODIUM SERPL-SCNC: 140 MMOL/L (ref 136–145)
TRIGL SERPL-MCNC: 134 MG/DL

## 2018-07-13 PROCEDURE — 80061 LIPID PANEL: CPT

## 2018-07-13 PROCEDURE — 83036 HEMOGLOBIN GLYCOSYLATED A1C: CPT

## 2018-07-13 PROCEDURE — 80053 COMPREHEN METABOLIC PANEL: CPT

## 2018-07-13 PROCEDURE — 36415 COLL VENOUS BLD VENIPUNCTURE: CPT

## 2018-07-16 DIAGNOSIS — I10 ESSENTIAL (PRIMARY) HYPERTENSION: ICD-10-CM

## 2018-07-16 DIAGNOSIS — R73.09 OTHER ABNORMAL GLUCOSE: ICD-10-CM

## 2018-07-16 NOTE — PROGRESS NOTES
Spoke with patient advised of recent lab work patient understood and will be in the office 24th for appt and will discuss in further detail with

## 2018-07-24 ENCOUNTER — OFFICE VISIT (OUTPATIENT)
Dept: INTERNAL MEDICINE CLINIC | Facility: CLINIC | Age: 58
End: 2018-07-24
Payer: COMMERCIAL

## 2018-07-24 VITALS
DIASTOLIC BLOOD PRESSURE: 84 MMHG | BODY MASS INDEX: 28.3 KG/M2 | RESPIRATION RATE: 16 BRPM | OXYGEN SATURATION: 98 % | SYSTOLIC BLOOD PRESSURE: 132 MMHG | HEIGHT: 76 IN | HEART RATE: 67 BPM | WEIGHT: 232.4 LBS

## 2018-07-24 DIAGNOSIS — F41.9 ANXIETY: ICD-10-CM

## 2018-07-24 DIAGNOSIS — M70.62 GREATER TROCHANTERIC BURSITIS, LEFT: ICD-10-CM

## 2018-07-24 DIAGNOSIS — N17.9 AKI (ACUTE KIDNEY INJURY) (HCC): ICD-10-CM

## 2018-07-24 DIAGNOSIS — E78.5 HYPERLIPIDEMIA, UNSPECIFIED HYPERLIPIDEMIA TYPE: ICD-10-CM

## 2018-07-24 DIAGNOSIS — N40.1 BPH WITH OBSTRUCTION/LOWER URINARY TRACT SYMPTOMS: Primary | ICD-10-CM

## 2018-07-24 DIAGNOSIS — N13.8 BPH WITH OBSTRUCTION/LOWER URINARY TRACT SYMPTOMS: Primary | ICD-10-CM

## 2018-07-24 DIAGNOSIS — N17.9 ACUTE KIDNEY INJURY (HCC): ICD-10-CM

## 2018-07-24 DIAGNOSIS — I10 ESSENTIAL HYPERTENSION: ICD-10-CM

## 2018-07-24 PROCEDURE — 99214 OFFICE O/P EST MOD 30 MIN: CPT | Performed by: INTERNAL MEDICINE

## 2018-07-24 PROCEDURE — 3075F SYST BP GE 130 - 139MM HG: CPT | Performed by: INTERNAL MEDICINE

## 2018-07-24 PROCEDURE — 3079F DIAST BP 80-89 MM HG: CPT | Performed by: INTERNAL MEDICINE

## 2018-07-24 RX ORDER — LIDOCAINE 50 MG/G
1 PATCH TOPICAL DAILY
Qty: 30 PATCH | Refills: 0 | Status: SHIPPED | OUTPATIENT
Start: 2018-07-24 | End: 2020-10-07 | Stop reason: ALTCHOICE

## 2018-07-24 RX ORDER — ESCITALOPRAM OXALATE 20 MG/1
20 TABLET ORAL DAILY
Qty: 90 TABLET | Refills: 1 | Status: SHIPPED | OUTPATIENT
Start: 2018-07-24 | End: 2019-03-10 | Stop reason: SDUPTHER

## 2018-07-24 RX ORDER — AMLODIPINE BESYLATE 5 MG/1
TABLET ORAL
Qty: 180 TABLET | Refills: 1 | Status: SHIPPED | OUTPATIENT
Start: 2018-07-24 | End: 2019-03-10 | Stop reason: SDUPTHER

## 2018-07-24 RX ORDER — ROSUVASTATIN CALCIUM 5 MG/1
5 TABLET, COATED ORAL DAILY
Qty: 90 TABLET | Refills: 1 | Status: SHIPPED | OUTPATIENT
Start: 2018-07-24 | End: 2018-11-16 | Stop reason: SDUPTHER

## 2018-07-24 NOTE — ASSESSMENT & PLAN NOTE
Examination is consistent with greater trochanter bursitis patient has elected to go for physical therapy if his symptoms not improved in the next month he is to contact me for steroid injection  Because he is having nocturnal symptoms I will check x-rays of the hip and femur  I have advised him to discontinue Aleve because the increase of creatinine and use lidocaine patch

## 2018-07-24 NOTE — PROGRESS NOTES
Assessment/Plan:           Problem List Items Addressed This Visit        Cardiovascular and Mediastinum    Essential hypertension     Hypertension - controlled, I have counseled patient following healthy balance diet, I would like the patient reduce sodium, exercise routinely, I would like the patient continued the med current medical regiment and we will continue to monitor  Relevant Medications    amLODIPine (NORVASC) 5 mg tablet    Other Relevant Orders    Comprehensive metabolic panel    Lipid Panel with Direct LDL reflex       Musculoskeletal and Integument    Greater trochanteric bursitis, left     Examination is consistent with greater trochanter bursitis patient has elected to go for physical therapy if his symptoms not improved in the next month he is to contact me for steroid injection  Because he is having nocturnal symptoms I will check x-rays of the hip and femur  I have advised him to discontinue Aleve because the increase of creatinine and use lidocaine patch  Relevant Medications    lidocaine (LIDODERM) 5 %    Other Relevant Orders    Ambulatory referral to Physical Therapy    XR hip/pelv 2-3 vws left if performed    XR femur 2 vw left       Genitourinary    BPH with obstruction/lower urinary tract symptoms - Primary     Currently stable doing well PSA is stable  Acute kidney injury ([address] )     Pre renal azotemia secondary to anti inflammatory suspected patient has discontinue leave I have counseled patient on the avoidance of anti-inflammatories, drink adequate amounts of water 4-6 glasses daily will recheck BMP in 2 weeks  Other    Anxiety     Currently stable patient did request refill of Lexapro which will be provided today           Relevant Medications    escitalopram (LEXAPRO) 20 mg tablet    Hyperlipidemia     Hyperlipidemia controlled continue with current medical regiment recommend a low-cholesterol diet and recommend routine exercise we will continue to monitor the progress  , continue with Crestor refill provided         Relevant Medications    rosuvastatin (CRESTOR) 5 mg tablet    Other Relevant Orders    Comprehensive metabolic panel    Lipid Panel with Direct LDL reflex      Other Visit Diagnoses     MAYELIN (acute kidney injury) ([facility] [address] )        Relevant Orders    Basic metabolic panel          Return to office 6  months  call if any problems  Subjective:      Patient ID: Colonel Doe is a 59 y o  male  HPI 62-year old male coming in for a follow up visit regarding essential hypertension, anxiety, hyperlipidemia, BPH, The patient reports me compliant taking medications without untoward side effects the  The patient is here to review his medical condition, update me on the medical condition and the patient reports me no hospitalizations and no ER visits  Patient does report me left lateral hip pain that has been waking him up from sleep over the last 1 month  No injury  He also reports me bilateral knee pain which has been chronic likely from arthritis  The patient reports me he has been taking Aleve on a routine basis up to about 10 days ago  He reports me exercising on the treadmill routinely  He does need refills on his medications  The following portions of the patient's history were reviewed and updated as appropriate: allergies, current medications, past family history, past medical history, past social history, past surgical history and problem list     Review of Systems   Constitutional: Negative for activity change, appetite change and unexpected weight change  HENT: Negative for congestion and postnasal drip  Eyes: Negative for visual disturbance  Respiratory: Negative for cough and shortness of breath  Cardiovascular: Negative for chest pain  Gastrointestinal: Negative for abdominal pain, diarrhea, nausea and vomiting  Musculoskeletal:        Left hip pain   Neurological: Negative for dizziness, light-headedness and headaches  Hematological: Negative for adenopathy  Objective:                    No Follow-up on file  Allergies   Allergen Reactions    Bee Venom Edema    Celecoxib Hives     Reaction Date: 21Nov2011; Category: Allergy; Past Medical History:   Diagnosis Date    [facility] ([facility] )     Hyperlipidemia     Malignant melanoma of skin ([facility] )     last assessed 10/25/17, resolved 4/20/15     Past Surgical History:   Procedure Laterality Date    COLONOSCOPY  2012    Dr Tal Sandy   Impression 1/12/15    HEMORROIDECTOMY      SKIN LESION EXCISION Right     Elbow     TONSILECTOMY AND ADNOIDECTOMY       Current Outpatient Prescriptions on File Prior to Visit   Medication Sig Dispense Refill    Ascorbic Acid (VITAMIN C) 1000 MG tablet Take 1 tablet by mouth daily      EPINEPHrine (EPIPEN 2-NICOLE) 0 3 mg/0 3 mL SOAJ Inject 0 3 mL as directed      [DISCONTINUED] amLODIPine (NORVASC) 5 mg tablet Take 1 tablet (5 mg total) by mouth 2 (two) times a day 180 tablet 1    [DISCONTINUED] escitalopram (LEXAPRO) 20 mg tablet Take 1 tablet (20 mg total) by mouth daily 90 tablet 1    [DISCONTINUED] rosuvastatin (CRESTOR) 5 mg tablet Take 1 tablet (5 mg total) by mouth daily 90 tablet 1     No current facility-administered medications on file prior to visit  Family History   Problem Relation Age of Onset    Diabetes Mother     Skin cancer Mother     Coronary artery disease Father      Social History     Social History    Marital status: /Civil Union     Spouse name: N/A    Number of children: N/A    Years of education: N/A     Occupational History    Not on file       Social History Main Topics    Smoking status: Never Smoker    Smokeless tobacco: Never Used    Alcohol use Yes      Comment: Social     Drug use: No    Sexual activity: Not on file     Other Topics Concern    Not on file     Social History Narrative    No narrative on file     Vitals:    07/24/18 0823   BP: 132/84   BP Location: Left arm Patient Position: Sitting   Cuff Size: Standard   Pulse: 67   Resp: 16   SpO2: 98%   Weight: 105 kg (232 lb 6 4 oz)   Height: 6' 4" (1 93 m)     Results for orders placed or performed in visit on 07/13/18   Comprehensive metabolic panel   Result Value Ref Range    Sodium 140 136 - 145 mmol/L    Potassium 3 7 3 5 - 5 3 mmol/L    Chloride 105 100 - 108 mmol/L    CO2 26 21 - 32 mmol/L    Anion Gap 9 4 - 13 mmol/L    BUN 25 5 - 25 mg/dL    Creatinine 1 31 (H) 0 60 - 1 30 mg/dL    Glucose, Fasting 91 65 - 99 mg/dL    Calcium 9 2 8 3 - 10 1 mg/dL    AST 20 5 - 45 U/L    ALT 32 12 - 78 U/L    Alkaline Phosphatase 70 46 - 116 U/L    Total Protein 7 7 6 4 - 8 2 g/dL    Albumin 4 2 3 5 - 5 0 g/dL    Total Bilirubin 1 86 (H) 0 20 - 1 00 mg/dL    eGFR 59 ml/min/1 73sq m   Hemoglobin A1c   Result Value Ref Range    Hemoglobin A1C 5 1 4 2 - 6 3 %     mg/dl   Lipid panel   Result Value Ref Range    Cholesterol 169 50 - 200 mg/dL    Triglycerides 134 <=150 mg/dL    HDL, Direct 47 40 - 60 mg/dL    LDL Calculated 95 0 - 100 mg/dL    Non-HDL-Chol (CHOL-HDL) 122 mg/dl     Weight (last 2 days)     Date/Time   Weight    07/24/18 0823  105 (232 4)            Body mass index is 28 29 kg/m²    /84 (07/24/18 0823)    Temp      Pulse 67 (07/24/18 0823)   Resp 16 (07/24/18 0823)    SpO2 98 % (07/24/18 0450)      Vitals:    07/24/18 0823   Weight: 105 kg (232 lb 6 4 oz)     Vitals:    07/24/18 0823   Weight: 105 kg (232 lb 6 4 oz)       /84 (BP Location: Left arm, Patient Position: Sitting, Cuff Size: Standard)   Pulse 67   Resp 16   Ht 6' 4" (1 93 m)   Wt 105 kg (232 lb 6 4 oz)   SpO2 98%   BMI 28 29 kg/m²       Nurse Barbie Roach in the room during the examination of the hip region full range of motion of the left hip there is exquisite tenderness over the greater trochanteric bursa and also iliotibial band examination of the knee mild chronic tenderness over the suprapatellar bursa he reports me he has had this for may years since playing ice hockey  No redness, no swelling  Physical Exam   Constitutional: He appears well-developed and well-nourished  No distress  HENT:   Head: Normocephalic and atraumatic  Right Ear: External ear normal    Left Ear: External ear normal    Mouth/Throat: Oropharynx is clear and moist    Eyes: Conjunctivae are normal  Pupils are equal, round, and reactive to light  Right eye exhibits no discharge  Left eye exhibits no discharge  No scleral icterus  Neck: Neck supple  Cardiovascular: Normal rate, regular rhythm and normal heart sounds  Exam reveals no gallop and no friction rub  No murmur heard  Pulmonary/Chest: No respiratory distress  He has no wheezes  He has no rales  Abdominal: Soft  Bowel sounds are normal  He exhibits no distension and no mass  There is no tenderness  There is no rebound and no guarding  Musculoskeletal: He exhibits no edema or deformity  Lymphadenopathy:     He has no cervical adenopathy  Neurological: He is alert  Skin: He is not diaphoretic  Psychiatric: He has a normal mood and affect

## 2018-07-24 NOTE — ASSESSMENT & PLAN NOTE
Hyperlipidemia controlled continue with current medical regiment recommend a low-cholesterol diet and recommend routine exercise we will continue to monitor the progress  , continue with Crestor refill provided

## 2018-07-24 NOTE — ASSESSMENT & PLAN NOTE
Pre renal azotemia secondary to anti inflammatory suspected patient has discontinue leave I have counseled patient on the avoidance of anti-inflammatories, drink adequate amounts of water 4-6 glasses daily will recheck BMP in 2 weeks

## 2018-07-26 ENCOUNTER — APPOINTMENT (OUTPATIENT)
Dept: RADIOLOGY | Age: 58
End: 2018-07-26
Payer: COMMERCIAL

## 2018-07-26 DIAGNOSIS — M70.62 GREATER TROCHANTERIC BURSITIS, LEFT: ICD-10-CM

## 2018-07-26 PROCEDURE — 73502 X-RAY EXAM HIP UNI 2-3 VIEWS: CPT

## 2018-07-26 PROCEDURE — 73552 X-RAY EXAM OF FEMUR 2/>: CPT

## 2018-07-30 ENCOUNTER — TELEPHONE (OUTPATIENT)
Dept: INTERNAL MEDICINE CLINIC | Facility: CLINIC | Age: 58
End: 2018-07-30

## 2018-07-31 NOTE — TELEPHONE ENCOUNTER
Spoke to patient and advised that authorization was just completed today by Dr Linda Mares via fax  We are awaiting determination and will let the patient know once received  They do inform the patient via phone call first so I told him to let us know if he hears from them first     8/1/18- Lidocaine Patch approved from 7/31/18-7/31/19  Pharmacy aware   LM for patient on AM

## 2018-08-02 ENCOUNTER — OFFICE VISIT (OUTPATIENT)
Dept: INTERNAL MEDICINE CLINIC | Facility: CLINIC | Age: 58
End: 2018-08-02
Payer: COMMERCIAL

## 2018-08-02 VITALS
DIASTOLIC BLOOD PRESSURE: 82 MMHG | RESPIRATION RATE: 16 BRPM | HEIGHT: 76 IN | WEIGHT: 229.6 LBS | OXYGEN SATURATION: 98 % | BODY MASS INDEX: 27.96 KG/M2 | HEART RATE: 85 BPM | SYSTOLIC BLOOD PRESSURE: 128 MMHG

## 2018-08-02 DIAGNOSIS — M70.62 GREATER TROCHANTERIC BURSITIS, LEFT: ICD-10-CM

## 2018-08-02 DIAGNOSIS — C43.9 MALIGNANT MELANOMA OF SKIN (HCC): ICD-10-CM

## 2018-08-02 DIAGNOSIS — R93.7 ABNORMAL BONE RADIOGRAPH: Primary | ICD-10-CM

## 2018-08-02 PROCEDURE — 99214 OFFICE O/P EST MOD 30 MIN: CPT | Performed by: INTERNAL MEDICINE

## 2018-08-02 NOTE — PROGRESS NOTES
Assessment/Plan:           Problem List Items Addressed This Visit        Musculoskeletal and Integument    Greater trochanteric bursitis, left     Lidocaine patch is helping, the patient is using at nighttime, patient will start physical therapy after workup of the sclerotic bone lesion  Malignant melanoma of skin ([address] )     To date we discussed have his know how a wide does years ago because of the new sclerotic lesion of the hip he will be checking a bone scan            Other    Abnormal bone radiograph - Primary     I have reviewed the x-ray with the patient today in detail which does show a sclerotic bone lesion he does report me a recent injury in this area in multiple injuries in the past as an ice  the could account for this finding, of note the prior x-ray did not show at  He has a history of melanoma 5 years ago  Will order a bone scan been recommended by Radiology  Relevant Orders    NM bone scan spect    CBC (Includes Diff/Plt) (Refl)          Return to office  1/29/19  call if any problems  Length of visit 25 min time of counseling greater than 50% review the x-ray findings, discussion of the bone scan, discussion of the differential diagnosis, scar, metastatic disease, and bone tumor  Subjective:      Patient ID: Eloy Freedrick is a 59 y o  male  HPI 62-year old male coming in for a follow up visit regarding abnormal x-ray of the hip showing a sclerotic lesion, greater trochanter bursitis, history of malignant melanoma 5 years ago; the patient does reports me the level the the hip/thigh pain has improved at nighttime with lidocaine patch but he does feel it during the day  He does report me recently bumping the left hip prior to the x-ray  No family history of bone problems  No unexplained weight loss        The following portions of the patient's history were reviewed and updated as appropriate: allergies, current medications, past family history, past medical history, past social history, past surgical history and problem list     Review of Systems   Constitutional: Negative for activity change, appetite change and unexpected weight change  Respiratory: Negative for cough and shortness of breath  Cardiovascular: Negative for chest pain  Musculoskeletal:        Bilateral hip pain         Objective:                    No Follow-up on file  Allergies   Allergen Reactions    Bee Venom Edema    Celecoxib Hives     Reaction Date: 21Nov2011; Category: Allergy; Past Medical History:   Diagnosis Date    [facility] [address] )     Hyperlipidemia     Malignant melanoma of skin ([facility] )     last assessed 10/25/17, resolved 4/20/15     Past Surgical History:   Procedure Laterality Date    COLONOSCOPY  2012    Dr Linda Brody   Impression 1/12/15    HEMORROIDECTOMY      SKIN LESION EXCISION Right     Elbow     TONSILECTOMY AND ADNOIDECTOMY       Current Outpatient Prescriptions on File Prior to Visit   Medication Sig Dispense Refill    amLODIPine (NORVASC) 5 mg tablet Take 2 tablets in the morning  180 tablet 1    Ascorbic Acid (VITAMIN C) 1000 MG tablet Take 1 tablet by mouth daily      EPINEPHrine (EPIPEN 2-NICOLE) 0 3 mg/0 3 mL SOAJ Inject 0 3 mL as directed      escitalopram (LEXAPRO) 20 mg tablet Take 1 tablet (20 mg total) by mouth daily 90 tablet 1    lidocaine (LIDODERM) 5 % Place 1 patch on the skin daily Remove & Discard patch within 12 hours or as directed by MD 30 patch 0    rosuvastatin (CRESTOR) 5 mg tablet Take 1 tablet (5 mg total) by mouth daily 90 tablet 1     No current facility-administered medications on file prior to visit  Family History   Problem Relation Age of Onset    Diabetes Mother     Skin cancer Mother     Coronary artery disease Father      Social History     Social History    Marital status: /Civil Union     Spouse name: N/A    Number of children: N/A    Years of education: N/A     Occupational History    Not on file  Social History Main Topics    Smoking status: Never Smoker    Smokeless tobacco: Never Used    Alcohol use Yes      Comment: Social     Drug use: No    Sexual activity: Not on file     Other Topics Concern    Not on file     Social History Narrative    No narrative on file     Vitals:    08/02/18 1435   BP: 128/82   BP Location: Right arm   Patient Position: Sitting   Cuff Size: Standard   Pulse: 85   Resp: 16   SpO2: 98%   Weight: 104 kg (229 lb 9 6 oz)   Height: 6' 4" (1 93 m)     Results for orders placed or performed in visit on 07/13/18   Comprehensive metabolic panel   Result Value Ref Range    Sodium 140 136 - 145 mmol/L    Potassium 3 7 3 5 - 5 3 mmol/L    Chloride 105 100 - 108 mmol/L    CO2 26 21 - 32 mmol/L    Anion Gap 9 4 - 13 mmol/L    BUN 25 5 - 25 mg/dL    Creatinine 1 31 (H) 0 60 - 1 30 mg/dL    Glucose, Fasting 91 65 - 99 mg/dL    Calcium 9 2 8 3 - 10 1 mg/dL    AST 20 5 - 45 U/L    ALT 32 12 - 78 U/L    Alkaline Phosphatase 70 46 - 116 U/L    Total Protein 7 7 6 4 - 8 2 g/dL    Albumin 4 2 3 5 - 5 0 g/dL    Total Bilirubin 1 86 (H) 0 20 - 1 00 mg/dL    eGFR 59 ml/min/1 73sq m   Hemoglobin A1c   Result Value Ref Range    Hemoglobin A1C 5 1 4 2 - 6 3 %     mg/dl   Lipid panel   Result Value Ref Range    Cholesterol 169 50 - 200 mg/dL    Triglycerides 134 <=150 mg/dL    HDL, Direct 47 40 - 60 mg/dL    LDL Calculated 95 0 - 100 mg/dL    Non-HDL-Chol (CHOL-HDL) 122 mg/dl     Weight (last 2 days)     Date/Time   Weight    08/02/18 1435  104 (229 6)            Body mass index is 27 95 kg/m²    BP      Temp      Pulse     Resp      SpO2        Vitals:    08/02/18 1435   Weight: 104 kg (229 lb 9 6 oz)     Vitals:    08/02/18 1435   Weight: 104 kg (229 lb 9 6 oz)       /82 (BP Location: Right arm, Patient Position: Sitting, Cuff Size: Standard)   Pulse 85   Resp 16   Ht 6' 4" (1 93 m)   Wt 104 kg (229 lb 9 6 oz)   SpO2 98%   BMI 27 95 kg/m²          Physical Exam Constitutional: He appears well-developed and well-nourished  No distress  HENT:   Head: Normocephalic and atraumatic  Right Ear: External ear normal    Left Ear: External ear normal    Mouth/Throat: Oropharynx is clear and moist    Eyes: Conjunctivae are normal  Pupils are equal, round, and reactive to light  Right eye exhibits no discharge  Left eye exhibits no discharge  No scleral icterus  Neck: Neck supple  Cardiovascular: Normal rate, regular rhythm and normal heart sounds  Exam reveals no gallop and no friction rub  No murmur heard  Pulmonary/Chest: No respiratory distress  He has no wheezes  He has no rales  Abdominal: Soft  Bowel sounds are normal    Musculoskeletal: He exhibits no edema or deformity  Lymphadenopathy:     He has no cervical adenopathy  Neurological: He is alert  Skin: He is not diaphoretic  Psychiatric: He has a normal mood and affect

## 2018-08-03 NOTE — ASSESSMENT & PLAN NOTE
I have reviewed the x-ray with the patient today in detail which does show a sclerotic bone lesion he does report me a recent injury in this area in multiple injuries in the past as an ice  the could account for this finding, of note the prior x-ray did not show at  He has a history of melanoma 5 years ago  Will order a bone scan been recommended by Radiology

## 2018-08-03 NOTE — ASSESSMENT & PLAN NOTE
Lidocaine patch is helping, the patient is using at nighttime, patient will start physical therapy after workup of the sclerotic bone lesion

## 2018-08-03 NOTE — ASSESSMENT & PLAN NOTE
To date we discussed have his know how a wide does years ago because of the new sclerotic lesion of the hip he will be checking a bone scan

## 2018-08-14 ENCOUNTER — APPOINTMENT (OUTPATIENT)
Dept: LAB | Facility: CLINIC | Age: 58
End: 2018-08-14
Payer: COMMERCIAL

## 2018-08-14 ENCOUNTER — TELEPHONE (OUTPATIENT)
Dept: INTERNAL MEDICINE CLINIC | Facility: CLINIC | Age: 58
End: 2018-08-14

## 2018-08-14 ENCOUNTER — TRANSCRIBE ORDERS (OUTPATIENT)
Dept: LAB | Facility: CLINIC | Age: 58
End: 2018-08-14

## 2018-08-14 DIAGNOSIS — N17.9 AKI (ACUTE KIDNEY INJURY) (HCC): ICD-10-CM

## 2018-08-14 DIAGNOSIS — R93.7 ABNORMAL BONE RADIOGRAPH: Primary | ICD-10-CM

## 2018-08-14 DIAGNOSIS — M89.9 BONE LESION: Primary | ICD-10-CM

## 2018-08-14 DIAGNOSIS — R93.7 ABNORMAL BONE RADIOGRAPH: ICD-10-CM

## 2018-08-14 LAB
ANION GAP SERPL CALCULATED.3IONS-SCNC: 9 MMOL/L (ref 4–13)
BASOPHILS # BLD AUTO: 0.06 THOUSANDS/ΜL (ref 0–0.1)
BASOPHILS NFR BLD AUTO: 1 % (ref 0–1)
BUN SERPL-MCNC: 18 MG/DL (ref 5–25)
CALCIUM SERPL-MCNC: 9.2 MG/DL (ref 8.3–10.1)
CHLORIDE SERPL-SCNC: 104 MMOL/L (ref 100–108)
CO2 SERPL-SCNC: 26 MMOL/L (ref 21–32)
CREAT SERPL-MCNC: 1.07 MG/DL (ref 0.6–1.3)
EOSINOPHIL # BLD AUTO: 0.42 THOUSAND/ΜL (ref 0–0.61)
EOSINOPHIL NFR BLD AUTO: 6 % (ref 0–6)
ERYTHROCYTE [DISTWIDTH] IN BLOOD BY AUTOMATED COUNT: 11.9 % (ref 11.6–15.1)
GFR SERPL CREATININE-BSD FRML MDRD: 76 ML/MIN/1.73SQ M
GLUCOSE P FAST SERPL-MCNC: 98 MG/DL (ref 65–99)
HCT VFR BLD AUTO: 42.3 % (ref 36.5–49.3)
HGB BLD-MCNC: 14.9 G/DL (ref 12–17)
IMM GRANULOCYTES # BLD AUTO: 0.03 THOUSAND/UL (ref 0–0.2)
IMM GRANULOCYTES NFR BLD AUTO: 0 % (ref 0–2)
LYMPHOCYTES # BLD AUTO: 1.66 THOUSANDS/ΜL (ref 0.6–4.47)
LYMPHOCYTES NFR BLD AUTO: 25 % (ref 14–44)
MCH RBC QN AUTO: 31.4 PG (ref 26.8–34.3)
MCHC RBC AUTO-ENTMCNC: 35.2 G/DL (ref 31.4–37.4)
MCV RBC AUTO: 89 FL (ref 82–98)
MONOCYTES # BLD AUTO: 0.57 THOUSAND/ΜL (ref 0.17–1.22)
MONOCYTES NFR BLD AUTO: 9 % (ref 4–12)
NEUTROPHILS # BLD AUTO: 3.94 THOUSANDS/ΜL (ref 1.85–7.62)
NEUTS SEG NFR BLD AUTO: 59 % (ref 43–75)
NRBC BLD AUTO-RTO: 0 /100 WBCS
PLATELET # BLD AUTO: 253 THOUSANDS/UL (ref 149–390)
PMV BLD AUTO: 10.1 FL (ref 8.9–12.7)
POTASSIUM SERPL-SCNC: 3.5 MMOL/L (ref 3.5–5.3)
RBC # BLD AUTO: 4.74 MILLION/UL (ref 3.88–5.62)
SODIUM SERPL-SCNC: 139 MMOL/L (ref 136–145)
WBC # BLD AUTO: 6.68 THOUSAND/UL (ref 4.31–10.16)

## 2018-08-14 PROCEDURE — 80048 BASIC METABOLIC PNL TOTAL CA: CPT

## 2018-08-14 PROCEDURE — 36415 COLL VENOUS BLD VENIPUNCTURE: CPT

## 2018-08-14 PROCEDURE — 85025 COMPLETE CBC W/AUTO DIFF WBC: CPT

## 2018-08-15 ENCOUNTER — HOSPITAL ENCOUNTER (OUTPATIENT)
Dept: NUCLEAR MEDICINE | Facility: HOSPITAL | Age: 58
Discharge: HOME | End: 2018-08-15
Payer: COMMERCIAL

## 2018-08-15 ENCOUNTER — APPOINTMENT (OUTPATIENT)
Dept: PHYSICAL THERAPY | Facility: CLINIC | Age: 58
End: 2018-08-15
Payer: COMMERCIAL

## 2018-08-15 DIAGNOSIS — R93.7 ABNORMAL BONE RADIOGRAPH: ICD-10-CM

## 2018-08-15 PROCEDURE — A9503 TC99M MEDRONATE: HCPCS

## 2018-08-15 PROCEDURE — 78320 HB BONE IMAGING (3D): CPT

## 2018-08-17 ENCOUNTER — APPOINTMENT (OUTPATIENT)
Dept: PHYSICAL THERAPY | Facility: CLINIC | Age: 58
End: 2018-08-17
Payer: COMMERCIAL

## 2018-08-27 ENCOUNTER — EVALUATION (OUTPATIENT)
Dept: PHYSICAL THERAPY | Facility: CLINIC | Age: 58
End: 2018-08-27
Payer: COMMERCIAL

## 2018-08-27 DIAGNOSIS — M54.16 LUMBAR RADICULOPATHY: ICD-10-CM

## 2018-08-27 DIAGNOSIS — M70.62 GREATER TROCHANTERIC BURSITIS, LEFT: Primary | ICD-10-CM

## 2018-08-27 PROCEDURE — G8991 OTHER PT/OT GOAL STATUS: HCPCS | Performed by: PHYSICAL THERAPIST

## 2018-08-27 PROCEDURE — G8990 OTHER PT/OT CURRENT STATUS: HCPCS | Performed by: PHYSICAL THERAPIST

## 2018-08-27 PROCEDURE — 97161 PT EVAL LOW COMPLEX 20 MIN: CPT | Performed by: PHYSICAL THERAPIST

## 2018-08-27 PROCEDURE — 97110 THERAPEUTIC EXERCISES: CPT | Performed by: PHYSICAL THERAPIST

## 2018-08-27 NOTE — PROGRESS NOTES
PT Evaluation     Today's date: 2018  Patient name: Viri Barclay  :   MRN: [id number]  Referring provider: Karan Palma DO  Dx:   Encounter Diagnosis     ICD-10-CM    1  Greater trochanteric bursitis, left M70 62 Ambulatory referral to Physical Therapy   2  Lumbar radiculopathy M54 16                   Assessment  Functional limitations: limited sleeping tolerance  Assessment details: Pt is a 58 yo male with diagnosis of left trochanteric bursitis presenting with significantly decreased flexibility, decreased BLE strength, decreased core stability, and limited sleeping tolerance  His symptoms are also consistent with lumbar radiculopathy, decreasing with repeated prone press ups  He is an excellent candidate for outpatient PT to address the above limitations and optimize functional status  Understanding of Dx/Px/POC: good   Prognosis: good    Goals  STG - 3 weeks  1  Independent with HEP  2  Tolerate sleeping through the night with no more than 2 interruptions due to pain  LTG - 6 weeks  1  Increase lower abdominal and B hip strength by at least one MMT grade to indicate improved core stability  2  Tolerate sleeping through the night without interruption due to pain  3  Normalize BLE flexibility to promote good body mechanics during daily tasks      Plan  Patient would benefit from: skilled physical therapy  Planned modality interventions: cryotherapy and low level laser therapy  Planned therapy interventions: manual therapy, patient education, postural training, therapeutic activities, therapeutic exercise, strengthening, stretching and home exercise program  Other planned therapy interventions: IASTM, kinesiotaping  Frequency: 2x week  Duration in weeks: 6  Plan of Care expiration date: 10/8/2018  Treatment plan discussed with: patient        Subjective Evaluation    History of Present Illness  Date of onset: 2018  Mechanism of injury: Pt began waking up with terrible pain in his left hip appx 1 5 months ago with no apparent cause for onset  The pain is now waking him up every 2-3 hours  Xrays reveal a small sclerotic nodule at the femoral neck but a bone scan reveals no concerns  He reports no longer using the lidocaine patches as he feels they increase his pain  He arrives with referral to outpatient PT today  PMH significant for B knee OA, h/o multiple ankle fractures/sprains, HTN  Pain  Current pain ratin  At best pain ratin  At worst pain ratin  Location: L lateral hip radiating along IT band  Quality: dull ache  Relieving factors: change in position and medications  Aggravating factors: stair climbing  Progression: improved    Social Support    Employment status: not working (retired)  Exercise history: treadmill      Diagnostic Tests  X-ray: abnormal  Bone scan: normal  Treatments  No previous or current treatments  Patient Goals  Patient goals for therapy: decreased pain          Objective     Palpation   Left   Tenderness of the TFL  Tenderness     Left Hip   Tenderness in the greater trochanter       Additional Tenderness Details  TTP at L ITB    Active Range of Motion     Lumbar   Flexion: Active lumbar flexion: 75%   Extension: Active lumbar extension: 50%   Left lateral flexion: WFL  Right lateral flexion: WFL  Left rotation: WFL  Right rotation: [facility]    Additional Active Range of Motion Details  Increased LLE pain with forward flexion of lumbar spine    Passive Range of Motion     Additional Passive Range of Motion Details  Hamstrings B at 90-90 = -55  L ITB = moderately restricted  B Piriformis = moderately restricted  Prone quad/hip flexor = moderately restricted    Strength/Myotome Testing     Left Hip   Planes of Motion   Flexion: 4  Extension: 4-  Abduction: 3+    Right Hip   Planes of Motion   Flexion: 4  Extension: 4-  Abduction: 4    Additional Strength Details  Upper abs = 5/5  Lower abs = <3/5 with poor recruitment/motor control    Tests     Left Hip   Positive Jf       Additional Tests Details  Decreased pain/tenderness at L hip after repeated prone press ups      Flowsheet Rows      Most Recent Value   PT/OT G-Codes   Current Score  67   Projected Score  74   Assessment Type  Evaluation   G code set  Mobility: Walking & Moving Around   Other PT Primary Current Status ()  CJ   Other PT Primary Goal Status ()  CJ          Precautions: HTN, B knee OA    Daily Treatment Diary       Manuals 8/27            IASTM L ITB             50% US greater troch                                       Exercise Diary              Bike             B Hamstring str w/ strap 30"x3            B ITB str w/ strap 30"x3            B Prone quad str w/ strap 30"x3            B Piriformis str (as able)             Prone Press Up 5"x10            AB             AB w/ bridge             AB w/ hip add             AB w/ hip abd             Prone hip ext             Prone hip ext, kf             SL hip abd             Squats over mat                                                                                                                     Modalities

## 2018-08-29 ENCOUNTER — APPOINTMENT (OUTPATIENT)
Dept: PHYSICAL THERAPY | Facility: CLINIC | Age: 58
End: 2018-08-29
Payer: COMMERCIAL

## 2018-08-30 ENCOUNTER — OFFICE VISIT (OUTPATIENT)
Dept: PHYSICAL THERAPY | Facility: CLINIC | Age: 58
End: 2018-08-30
Payer: COMMERCIAL

## 2018-08-30 DIAGNOSIS — M70.62 GREATER TROCHANTERIC BURSITIS, LEFT: Primary | ICD-10-CM

## 2018-08-30 PROCEDURE — 97110 THERAPEUTIC EXERCISES: CPT | Performed by: PHYSICAL THERAPIST

## 2018-08-30 PROCEDURE — 97530 THERAPEUTIC ACTIVITIES: CPT | Performed by: PHYSICAL THERAPIST

## 2018-08-30 PROCEDURE — 97140 MANUAL THERAPY 1/> REGIONS: CPT | Performed by: PHYSICAL THERAPIST

## 2018-08-30 NOTE — PROGRESS NOTES
Daily Note     Today's date: 2018  Patient name: Sanchez Mckenzie  :   MRN: [id number]  Referring provider: Erasmo Galeano DO  Dx: No diagnosis found  Subjective: Pt reports compliance with his hep and that he is not having any difficulty with it but that he has not noticed a change in his symptoms yet  Objective: See treatment diary below      Manuals            IASTM L ITB             50% US greater troch                                       Exercise Diary              Bike  8'           B Hamstring str w/ strap 30"x3 3           B ITB str w/ strap 30"x3 3           B Prone quad str w/ strap 30"x3 3           B Piriformis str (as able)  30"x3           Prone Press Up 5"x10 5"x10           AB             AB w/ bridge             AB w/ hip add  20           AB w/ hip abd  20           Prone hip ext             Prone hip ext, kf  20           SL hip abd             Squats over mat  20                                                                                                                   Modalities                                                 Assessment: Initated therex as listed with no complaints of pain  Reports fatigue with SLR's  Monitor response NV and progress as loretta NV      Plan: Continue per plan of care

## 2018-09-04 ENCOUNTER — OFFICE VISIT (OUTPATIENT)
Dept: PHYSICAL THERAPY | Facility: CLINIC | Age: 58
End: 2018-09-04
Payer: COMMERCIAL

## 2018-09-04 DIAGNOSIS — M54.16 LUMBAR RADICULOPATHY: ICD-10-CM

## 2018-09-04 DIAGNOSIS — M70.62 GREATER TROCHANTERIC BURSITIS, LEFT: Primary | ICD-10-CM

## 2018-09-04 PROCEDURE — 97110 THERAPEUTIC EXERCISES: CPT

## 2018-09-04 PROCEDURE — 97112 NEUROMUSCULAR REEDUCATION: CPT

## 2018-09-04 NOTE — PROGRESS NOTES
Daily Note     Today's date: 2018  Patient name: Scott Montoya  :   MRN: [id number]  Referring provider: Viola Puri DO  Dx:   Encounter Diagnosis     ICD-10-CM    1  Greater trochanteric bursitis, left M70 62    2  Lumbar radiculopathy M54 16        Start Time: 920  Stop Time: 1000  Total time in clinic (min): 40 minutes    Subjective: Pt reports mowing the lawn yesterday which takes about an hour or so with no issues in the low back  Patient reports that sleeping is improving noting his left hip has no discomfort in a static position and only gives him problems when he moves    Objective: See treatment diary below    Manuals           IASTM L ITB             50% US greater troch                                       Exercise Diary              Bike  8' 8'          B Hamstring str w/ strap 30"x3 3 3          B ITB str w/ strap 30"x3 3 3          B Prone quad str w/ strap 30"x3 3 3          B Piriformis str (as able)  30"x3 3          Prone Press Up 5"x10 5"x10 5"x10          AB             AB w/ bridge             AB w/ hip add  20 20          AB w/ hip abd  20 20          Prone hip ext             Prone hip ext, kf  20 20          SL hip abd             Squats over mat  20                                                                                                                   Modalities                                         Assessment: Patient demonstrates fair form during TE  Patient does have min increase in pain with stretching secondary to stretching a little too far  Patient corrected with verbal cueing to "maintain a gentle stretch with no pain"  Plan: Continue per plan of care

## 2018-09-07 ENCOUNTER — OFFICE VISIT (OUTPATIENT)
Dept: PHYSICAL THERAPY | Facility: CLINIC | Age: 58
End: 2018-09-07
Payer: COMMERCIAL

## 2018-09-07 DIAGNOSIS — M70.62 GREATER TROCHANTERIC BURSITIS, LEFT: Primary | ICD-10-CM

## 2018-09-07 PROCEDURE — 97110 THERAPEUTIC EXERCISES: CPT | Performed by: PHYSICAL THERAPIST

## 2018-09-07 PROCEDURE — 97112 NEUROMUSCULAR REEDUCATION: CPT | Performed by: PHYSICAL THERAPIST

## 2018-09-07 NOTE — PROGRESS NOTES
Daily Note     Today's date: 2018  Patient name: Tatyana Baldwin  :   MRN: [id number]  Referring provider: Jojo Ley DO  Dx:   No diagnosis found  Subjective: Pt reports slight increase in pain after attending a [employer] game yesterday but overall feels he is improving slowly  Objective: See treatment diary below    Manuals          IASTM L ITB             50% US greater troch                                       Exercise Diary             Bike  8' 8' 8'         B Hamstring str w/ strap 30"x3 3 3 4         B ITB str w/ strap 30"x3 3 3 4         B Prone quad str w/ strap 30"x3 3 3 4         B Piriformis str (as able)  30"x3 3 4         Prone Press Up 5"x10 5"x10 5"x10 10         AB             AB w/ bridge             AB w/ hip add  20 20          AB w/ hip abd  20 20          Prone hip ext             Prone hip ext, kf  20 20 20         SL hip abd             Squats over mat  20                                                                                                                   Modalities                                         Assessment: deferred some therex today secondary to increased pain, plan to resume full program NV    Plan: Continue per plan of care

## 2018-09-11 ENCOUNTER — OFFICE VISIT (OUTPATIENT)
Dept: PHYSICAL THERAPY | Facility: CLINIC | Age: 58
End: 2018-09-11
Payer: COMMERCIAL

## 2018-09-11 DIAGNOSIS — M70.62 GREATER TROCHANTERIC BURSITIS, LEFT: Primary | ICD-10-CM

## 2018-09-11 PROCEDURE — 97110 THERAPEUTIC EXERCISES: CPT | Performed by: PHYSICAL THERAPIST

## 2018-09-11 PROCEDURE — 97112 NEUROMUSCULAR REEDUCATION: CPT | Performed by: PHYSICAL THERAPIST

## 2018-09-11 NOTE — PROGRESS NOTES
Daily Note     Today's date: 2018  Patient name: Raymon Ng  :   MRN: [id number]  Referring provider: Fany Sofia DO  Dx:   Encounter Diagnosis     ICD-10-CM    1  Greater trochanteric bursitis, left M70 62                   Subjective: Pt reports that he experienced sig pain reduction after his LV but that he continues to have pain at night  Objective: See treatment diary below    Manuals         IASTM L ITB             50% US greater troch                                       Exercise Diary            Bike  8' 8' 8' 10'        B Hamstring str w/ strap 30"x3 3 3 4 4        B ITB str w/ strap 30"x3 3 3 4 4        B Prone quad str w/ strap 30"x3 3 3 4 4        B Piriformis str (as able)  30"x3 3 4         Prone Press Up 5"x10 5"x10 5"x10 10 5"x10        AB             AB w/ bridge             AB w/ hip add  20 20          AB w/ hip abd  20 20          Prone hip ext             Prone hip ext, kf  20 20 20 30        SL hip abd             Squats over mat  20           Stork kicks     Blue tb x20ea                                                                                                   Modalities                                         Assessment: added stork kicks today, pt reports fatigue but denies pain, monitor response as need and cont to progress as loretta NV    Plan: Continue per plan of care

## 2018-09-14 ENCOUNTER — OFFICE VISIT (OUTPATIENT)
Dept: PHYSICAL THERAPY | Facility: CLINIC | Age: 58
End: 2018-09-14
Payer: COMMERCIAL

## 2018-09-14 DIAGNOSIS — M70.62 GREATER TROCHANTERIC BURSITIS, LEFT: Primary | ICD-10-CM

## 2018-09-14 PROCEDURE — 97112 NEUROMUSCULAR REEDUCATION: CPT | Performed by: PHYSICAL THERAPIST

## 2018-09-14 PROCEDURE — 97110 THERAPEUTIC EXERCISES: CPT | Performed by: PHYSICAL THERAPIST

## 2018-09-14 NOTE — PROGRESS NOTES
Daily Note     Today's date: 2018  Patient name: Brisa Aviles  : [birth year]  MRN: [id number]  Referring provider: Charbel Elam DO  Dx:   No diagnosis found  Subjective: Pt reports that he experienced sig pain reduction after his LV but that he continues to have pain at night  Objective: See treatment diary below    Manuals        IASTM L ITB             50% US greater troch                                       Exercise Diary           Bike  8' 8' 8' 10' 10       B Hamstring str w/ strap 30"x3 3 3 4 4 4       B ITB str w/ strap 30"x3 3 3 4 4 4       B Prone quad str w/ strap 30"x3 3 3 4 4 4       B Piriformis str (as able)  30"x3 3 4         Prone Press Up 5"x10 5"x10 5"x10 10 5"x10 20       AB             AB w/ bridge             AB w/ hip add  20 20          AB w/ hip abd  20 20          Prone hip ext             Prone hip ext, kf  20 20 20 30 30       SL hip abd             Squats over mat  20           Stork kicks     Blue tb x20ea 20                                                                                                  Modalities                                         Assessment: Increased reps as listed with no complaints other then fatigue      Plan: Continue per plan of care

## 2018-09-18 ENCOUNTER — OFFICE VISIT (OUTPATIENT)
Dept: PHYSICAL THERAPY | Facility: CLINIC | Age: 58
End: 2018-09-18
Payer: COMMERCIAL

## 2018-09-18 DIAGNOSIS — M70.62 GREATER TROCHANTERIC BURSITIS, LEFT: Primary | ICD-10-CM

## 2018-09-18 PROCEDURE — 97112 NEUROMUSCULAR REEDUCATION: CPT | Performed by: PHYSICAL THERAPIST

## 2018-09-18 PROCEDURE — 97530 THERAPEUTIC ACTIVITIES: CPT | Performed by: PHYSICAL THERAPIST

## 2018-09-18 PROCEDURE — 97110 THERAPEUTIC EXERCISES: CPT | Performed by: PHYSICAL THERAPIST

## 2018-09-18 NOTE — PROGRESS NOTES
Daily Note     Today's date: 2018  Patient name: Colonel Doe  :   MRN: [id number]  Referring provider: Avtar Ríos DO  Dx:   Encounter Diagnosis     ICD-10-CM    1  Greater trochanteric bursitis, left M70 62                   Subjective: Pt reports continued mild pain at night but that he feels the intensity is decreasing , reports 1/10 pain upon arrival to PT    Objective: See treatment diary below    Manuals        IASTM L ITB             50% US greater troch                                       Exercise Diary           Bike  8' 8' 8' 10' 10       B Hamstring str w/ strap 30"x3 3 3 4 4 4       B ITB str w/ strap 30"x3 3 3 4 4 4       B Prone quad str w/ strap 30"x3 3 3 4 4 4       B Piriformis str (as able)  30"x3 3 4         Prone Press Up 5"x10 5"x10 5"x10 10 5"x10 20       AB             AB w/ bridge             AB w/ hip add  20 20          AB w/ hip abd  20 20          Prone hip ext             Prone hip ext, kf  20 20 20 30 30       SL hip abd             Squats over mat  20           Stork kicks     Blue tb x20ea 20       sidestepping      4 laps                                                                                     Modalities                                         Assessment: Increased reps as listed with no complaints other then fatigue, continue to progress lateral hip strengthening as loretta Nv      Plan: Continue per plan of care

## 2018-09-21 ENCOUNTER — OFFICE VISIT (OUTPATIENT)
Dept: PHYSICAL THERAPY | Facility: CLINIC | Age: 58
End: 2018-09-21
Payer: COMMERCIAL

## 2018-09-21 DIAGNOSIS — M70.62 GREATER TROCHANTERIC BURSITIS, LEFT: Primary | ICD-10-CM

## 2018-09-21 PROCEDURE — 97110 THERAPEUTIC EXERCISES: CPT

## 2018-09-21 PROCEDURE — 97035 APP MDLTY 1+ULTRASOUND EA 15: CPT

## 2018-09-21 PROCEDURE — 97140 MANUAL THERAPY 1/> REGIONS: CPT

## 2018-09-21 NOTE — PROGRESS NOTES
Daily Note     Today's date: 2018  Patient name: Zabrina Campbell  :   MRN: [id number]  Referring provider: Chelsea Davis DO  Dx:   Encounter Diagnosis     ICD-10-CM    1  Greater trochanteric bursitis, left M70 62                 Subjective: Patient complains left hip/back pain occurs mostly at night - rates pain 4-5/10 on average  Objective: See treatment diary below  Manuals       IASTM L ITB       tiger tail 5'      US greater troch       8'  1 5 w/cm2                                Exercise Diary          Bike  8' 8' 8' 10' 10 10'      B Hamstring str w/ strap 30"x3 3 3 4 4 4 30"x4      B ITB str w/ strap 30"x3 3 3 4 4 4 manual  30"x4      B Prone quad str w/ strap 30"x3 3 3 4 4 4 30"x4      B Piriformis str (as able)  30"x3 3 4         Prone Press Up 5"x10 5"x10 5"x10 10 5"x10 20 HEP      AB             AB w/ bridge             AB w/ hip add  20 20          AB w/ hip abd  20 20          Prone hip ext             Prone hip ext, kf  20 20 20 30 30       SL hip abd             Squats over mat  20     Leg press  30#  3x10      Stork kicks     Blue tb x20ea 20       sidestepping      4 laps 6 laps  green      Clamshell in sidelying       Green  2x10                                                                       Modalities                                         Assessment: Progression of therapeutic exercise program is tolerated well without complaints of significant left hip pain  Patient is comfortable throughout newly added ultrasound treatment  Plan: Continue treatment as per PT plan of care

## 2018-09-25 ENCOUNTER — APPOINTMENT (OUTPATIENT)
Dept: PHYSICAL THERAPY | Facility: CLINIC | Age: 58
End: 2018-09-25
Payer: COMMERCIAL

## 2018-09-25 ENCOUNTER — OFFICE VISIT (OUTPATIENT)
Dept: OBGYN CLINIC | Facility: CLINIC | Age: 58
End: 2018-09-25
Payer: COMMERCIAL

## 2018-09-25 ENCOUNTER — APPOINTMENT (OUTPATIENT)
Dept: RADIOLOGY | Facility: CLINIC | Age: 58
End: 2018-09-25
Payer: COMMERCIAL

## 2018-09-25 VITALS
BODY MASS INDEX: 27.64 KG/M2 | SYSTOLIC BLOOD PRESSURE: 122 MMHG | DIASTOLIC BLOOD PRESSURE: 78 MMHG | HEIGHT: 76 IN | HEART RATE: 82 BPM | WEIGHT: 227 LBS

## 2018-09-25 DIAGNOSIS — M17.11 PRIMARY OSTEOARTHRITIS OF RIGHT KNEE: Primary | ICD-10-CM

## 2018-09-25 DIAGNOSIS — M25.561 RIGHT KNEE PAIN, UNSPECIFIED CHRONICITY: ICD-10-CM

## 2018-09-25 PROCEDURE — 99213 OFFICE O/P EST LOW 20 MIN: CPT | Performed by: ORTHOPAEDIC SURGERY

## 2018-09-25 PROCEDURE — 73564 X-RAY EXAM KNEE 4 OR MORE: CPT

## 2018-09-25 NOTE — ASSESSMENT & PLAN NOTE
Findings consistent with right knee osteoarthritis  Discussed findings and treatment options with the patient  I review patient's right knee x-ray with him  Discussed prognosis of his right knee condition  Is symptoms most likely related to flaring up of the arthritis in his right knee  I advised patient to continue low-impact exercises with stationary bike or swimming  Patient should try to avoid repetitive squatting, kneeling, and climbing  I advised patient to contact me if his symptoms return or worsen  All patient's questions were answered to his satisfaction  This note is created using dictation transcription  It may contain typographical errors, grammatical errors, improperly dictated words, background noise and other errors

## 2018-09-25 NOTE — PROGRESS NOTES
Assessment:     1  Primary osteoarthritis of right knee        Plan:     Problem List Items Addressed This Visit        Musculoskeletal and Integument    Primary osteoarthritis of right knee - Primary     Findings consistent with right knee osteoarthritis  Discussed findings and treatment options with the patient  I review patient's right knee x-ray with him  Discussed prognosis of his right knee condition  Is symptoms most likely related to flaring up of the arthritis in his right knee  I advised patient to continue low-impact exercises with stationary bike or swimming  Patient should try to avoid repetitive squatting, kneeling, and climbing  I advised patient to contact me if his symptoms return or worsen  All patient's questions were answered to his satisfaction  This note is created using dictation transcription  It may contain typographical errors, grammatical errors, improperly dictated words, background noise and other errors  Relevant Orders    XR knee 4+ vw right injury         Subjective:     Patient ID: Libia Acosta is a 59 y o  male  Chief Complaint:  26-year-old male with history of mild right knee pain the past few years  Patient used to play ice hockey  He denies any injury  He had severe onset of pain in his right knee the past weekend  The pain increased after he was sitting for prolonged period time in a football game  He drove home for about an hour which caused his knee pain to increase  He describes the pain as throbbing and shooting pain down the lower leg  He denies locking or giving way sensations  He denies fever, chills, or sweats  Patient cannot take NSAID due to kidney issue  Today, patient has no pain in the right knee  He is walking without use of any assistive device  The pain seems to have resolved  Information on patient's intake form was reviewed        Allergy:  Allergies   Allergen Reactions    Bee Venom Edema    Celecoxib Hives     Reaction Date: 21Nov2011; Category: Allergy; Medications:  all current active meds have been reviewed  Past Medical History:  Past Medical History:   Diagnosis Date    Cancer ([facility] [address]     Hyperlipidemia     Malignant melanoma of skin ([facility] [address] )     last assessed 10/25/17, resolved 4/20/15     Past Surgical History:  Past Surgical History:   Procedure Laterality Date    COLONOSCOPY  2012    Dr Brooklyn Campbell   Impression 1/12/15    HEMORROIDECTOMY      SKIN LESION EXCISION Right     Elbow     TONSILECTOMY AND ADNOIDECTOMY       Family History:  Family History   Problem Relation Age of Onset    Diabetes Mother     Skin cancer Mother     Coronary artery disease Father      Social History:  History   Alcohol Use    Yes     Comment: Social      History   Drug Use No     History   Smoking Status    Never Smoker   Smokeless Tobacco    Never Used     Review of Systems   Constitutional: Negative  HENT: Negative  Eyes: Negative  Respiratory: Negative  Cardiovascular: Negative  Gastrointestinal: Negative  Endocrine: Negative  Genitourinary: Negative  Musculoskeletal: Negative for arthralgias, gait problem and joint swelling  Skin: Negative  Neurological: Negative  Hematological: Negative  Psychiatric/Behavioral: Negative  Objective:  BP Readings from Last 1 Encounters:   09/25/18 122/78      Wt Readings from Last 1 Encounters:   09/25/18 103 kg (227 lb)      BMI:   Estimated body mass index is 27 63 kg/m² as calculated from the following:    Height as of this encounter: 6' 4" (1 93 m)  Weight as of this encounter: 103 kg (227 lb)  BSA:   Estimated body surface area is 2 34 meters squared as calculated from the following:    Height as of this encounter: 6' 4" (1 93 m)  Weight as of this encounter: 103 kg (227 lb)  Physical Exam   Constitutional: He is oriented to person, place, and time  He appears well-developed  HENT:   Head: Normocephalic and atraumatic     Eyes: Conjunctivae and EOM are normal    Neck: Neck supple  Musculoskeletal:        Right knee: He exhibits no effusion  Neurological: He is alert and oriented to person, place, and time  Skin: Skin is warm  Psychiatric: He has a normal mood and affect  Nursing note and vitals reviewed  Right Knee Exam     Tenderness   The patient is experiencing no tenderness  Range of Motion   The patient has normal right knee ROM  Muscle Strength     The patient has normal right knee strength  Tests   Toro:  Medial - negative Lateral - negative  Varus: negative  Valgus: negative  Patellar Apprehension: negative    Other   Erythema: absent  Scars: absent  Sensation: normal  Pulse: present  Swelling: none  Other tests: no effusion present    Comments:  Crepitation in the patellofemoral joint            I have personally reviewed pertinent films in PACS and my interpretation is Right knee with good alignment  Mild to moderate osteoarthritis

## 2018-09-28 ENCOUNTER — TELEPHONE (OUTPATIENT)
Dept: INTERNAL MEDICINE CLINIC | Facility: CLINIC | Age: 58
End: 2018-09-28

## 2018-09-28 ENCOUNTER — OFFICE VISIT (OUTPATIENT)
Dept: PHYSICAL THERAPY | Facility: CLINIC | Age: 58
End: 2018-09-28
Payer: COMMERCIAL

## 2018-09-28 DIAGNOSIS — M70.62 GREATER TROCHANTERIC BURSITIS, LEFT: Primary | ICD-10-CM

## 2018-09-28 DIAGNOSIS — M54.16 LUMBAR RADICULOPATHY: ICD-10-CM

## 2018-09-28 PROCEDURE — 97110 THERAPEUTIC EXERCISES: CPT | Performed by: PHYSICAL THERAPIST

## 2018-09-28 PROCEDURE — 97140 MANUAL THERAPY 1/> REGIONS: CPT | Performed by: PHYSICAL THERAPIST

## 2018-09-28 NOTE — PROGRESS NOTES
Daily Note     Today's date: 2018  Patient name: Libia Acosta  :   MRN: [id number]  Referring provider: Fahad Aggarwal DO  Dx:   Encounter Diagnosis     ICD-10-CM    1  Greater trochanteric bursitis, left M70 62    2  Lumbar radiculopathy M54 16                 Subjective: Patient reports he had acute onset of severe right knee pain following his last PT session beginning Saturday  He states his pain was so severe he canceled his last PT session and went to see orthopedist  He states x-rays were performed and positive for arthritis  He was advised to use Aspercreme for pain  Pt rates his right knee pain at 1-2/10 prior to session today  Objective: See treatment diary below  Manuals      IASTM L ITB       tiger tail 5' tiger tail 5'     US greater troch       8'  1 5 w/cm2 8'  1 5 w/cm2                               Exercise Diary         Bike  8' 8' 8' 10' 10 10' 10'  Level 4     B Hamstring str w/ strap 30"x3 3 3 4 4 4 30"x4 30"x4  b/l     B ITB str w/ strap 30"x3 3 3 4 4 4 manual  30"x4 manual  30"x4     B Prone quad str w/ strap 30"x3 3 3 4 4 4 30"x4 30"x4  b/l     B Piriformis str (as able)  30"x3 3 4         Prone Press Up 5"x10 5"x10 5"x10 10 5"x10 20 HEP      AB             AB w/ bridge             AB w/ hip add  20 20          AB w/ hip abd  20 20          Prone hip ext             Prone hip ext, kf  20 20 20 30 30  NP     SL hip abd             Squats over mat  20     Leg press  30#  3x10 HOLD     Stork kicks     Blue tb x20ea 20  NP     sidestepping      4 laps 6 laps  green NP     Clamshell in sidelying       Green  2x10 NP                                                                      Modalities                                         Assessment: Pt presented to session with c/o acute onset right knee pain since last PT session, therefore, bilateral lower extremity strengthening exercises held today  Focus this session was on manual techniques and stretching  Pt reported no increase in pain with completion of session  Pt will benefit from continued skilled PT intervention in order to address his remaining limitations and to restore maximal function  Plan: Continue treatment as per PT plan of care

## 2018-10-01 NOTE — TELEPHONE ENCOUNTER
Check with the patient if he wanted to try a Medrol Dosepak or wait to see what orthopedic recommends tomorrow

## 2018-10-02 ENCOUNTER — OFFICE VISIT (OUTPATIENT)
Dept: OBGYN CLINIC | Facility: CLINIC | Age: 58
End: 2018-10-02
Payer: COMMERCIAL

## 2018-10-02 ENCOUNTER — OFFICE VISIT (OUTPATIENT)
Dept: PHYSICAL THERAPY | Facility: CLINIC | Age: 58
End: 2018-10-02
Payer: COMMERCIAL

## 2018-10-02 VITALS
DIASTOLIC BLOOD PRESSURE: 82 MMHG | WEIGHT: 232 LBS | SYSTOLIC BLOOD PRESSURE: 120 MMHG | HEIGHT: 76 IN | BODY MASS INDEX: 28.25 KG/M2 | HEART RATE: 80 BPM

## 2018-10-02 DIAGNOSIS — M17.11 PRIMARY OSTEOARTHRITIS OF RIGHT KNEE: Primary | ICD-10-CM

## 2018-10-02 DIAGNOSIS — M70.62 GREATER TROCHANTERIC BURSITIS, LEFT: Primary | ICD-10-CM

## 2018-10-02 PROCEDURE — 97110 THERAPEUTIC EXERCISES: CPT | Performed by: PHYSICAL THERAPIST

## 2018-10-02 PROCEDURE — 99213 OFFICE O/P EST LOW 20 MIN: CPT | Performed by: ORTHOPAEDIC SURGERY

## 2018-10-02 PROCEDURE — 97140 MANUAL THERAPY 1/> REGIONS: CPT | Performed by: PHYSICAL THERAPIST

## 2018-10-02 PROCEDURE — 20610 DRAIN/INJ JOINT/BURSA W/O US: CPT | Performed by: ORTHOPAEDIC SURGERY

## 2018-10-02 RX ORDER — LIDOCAINE HYDROCHLORIDE 10 MG/ML
7 INJECTION, SOLUTION EPIDURAL; INFILTRATION; INTRACAUDAL; PERINEURAL
Status: COMPLETED | OUTPATIENT
Start: 2018-10-02 | End: 2018-10-02

## 2018-10-02 RX ORDER — BETAMETHASONE SODIUM PHOSPHATE AND BETAMETHASONE ACETATE 3; 3 MG/ML; MG/ML
6 INJECTION, SUSPENSION INTRA-ARTICULAR; INTRALESIONAL; INTRAMUSCULAR; SOFT TISSUE
Status: COMPLETED | OUTPATIENT
Start: 2018-10-02 | End: 2018-10-02

## 2018-10-02 RX ADMIN — LIDOCAINE HYDROCHLORIDE 7 ML: 10 INJECTION, SOLUTION EPIDURAL; INFILTRATION; INTRACAUDAL; PERINEURAL at 11:42

## 2018-10-02 RX ADMIN — BETAMETHASONE SODIUM PHOSPHATE AND BETAMETHASONE ACETATE 6 MG: 3; 3 INJECTION, SUSPENSION INTRA-ARTICULAR; INTRALESIONAL; INTRAMUSCULAR; SOFT TISSUE at 11:42

## 2018-10-02 NOTE — PROGRESS NOTES
Assessment:     1  Primary osteoarthritis of right knee        Plan:     Problem List Items Addressed This Visit        Musculoskeletal and Integument    Primary osteoarthritis of right knee - Primary     Findings consistent with right knee osteoarthritis  Discussed findings and treatment options with the patient  I provided patient cortisone injection in the right knee, which patient tolerated well  I advised patient to continue low-impact exercises and avoid repetitive squatting, kneeling, or climbing  Patient has kidney issue and cannot take NSAIDs  I advised patient to use cold compress today  If patient's symptoms persist despite the injection, we may have to tried [employer] supplement injections  All patient's questions were answered to his satisfaction  This note is created using dictation transcription  It may contain typographical errors, grammatical errors, improperly dictated words, background noise and other errors  Relevant Medications    lidocaine (PF) (XYLOCAINE-MPF) 1 % injection 7 mL (Completed)    betamethasone acetate-betamethasone sodium phosphate (CELESTONE) injection 6 mg (Completed)    Other Relevant Orders    Large joint arthrocentesis (Completed)         Subjective:     Patient ID: Ladonna Lambert is a 59 y o  male  Chief Complaint:  78-year-old male with history of mild right knee pain the past few years  Patient used to play ice hockey  He denies any injury  He had severe onset of pain in his right knee the past weekend  The pain increased after he was sitting for prolonged period time in a football game  He drove home for about an hour which caused his knee pain to increase  He describes the pain as throbbing and shooting pain down the lower leg  He denies locking or giving way sensations  He denies fever, chills, or sweats  Patient cannot take NSAID due to kidney issue  Patient's symptoms returned and got worse    When sitting in the vehicle for short length of time his knee developed aching pain  Pain radiate down towards the ankle  Allergy:  Allergies   Allergen Reactions    Bee Venom Edema    Celecoxib Hives     Reaction Date: 21Nov2011; Category: Allergy; Medications:  all current active meds have been reviewed  Past Medical History:  Past Medical History:   Diagnosis Date    Cancer ([facility] [address]     Hyperlipidemia     Malignant melanoma of skin ([facility] [address] )     last assessed 10/25/17, resolved 4/20/15     Past Surgical History:  Past Surgical History:   Procedure Laterality Date    COLONOSCOPY  2012    Dr Benton Handing   Impression 1/12/15    HEMORROIDECTOMY      SKIN LESION EXCISION Right     Elbow     TONSILECTOMY AND ADNOIDECTOMY       Family History:  Family History   Problem Relation Age of Onset    Diabetes Mother     Skin cancer Mother     Coronary artery disease Father      Social History:  History   Alcohol Use    Yes     Comment: Social      History   Drug Use No     History   Smoking Status    Never Smoker   Smokeless Tobacco    Never Used     Review of Systems   Constitutional: Negative  HENT: Negative  Eyes: Negative  Respiratory: Negative  Cardiovascular: Negative  Gastrointestinal: Negative  Endocrine: Negative  Genitourinary: Negative  Musculoskeletal: Positive for arthralgias (Right knee)  Negative for gait problem and joint swelling  Skin: Negative  Neurological: Negative  Hematological: Negative  Psychiatric/Behavioral: Negative  Objective:  BP Readings from Last 1 Encounters:   10/02/18 120/82      Wt Readings from Last 1 Encounters:   10/02/18 105 kg (232 lb)      BMI:   Estimated body mass index is 28 24 kg/m² as calculated from the following:    Height as of this encounter: 6' 4" (1 93 m)  Weight as of this encounter: 105 kg (232 lb)  BSA:   Estimated body surface area is 2 36 meters squared as calculated from the following:    Height as of this encounter: 6' 4" (1 93 m)      Weight as of this encounter: 105 kg (232 lb)  Physical Exam   Constitutional: He is oriented to person, place, and time  He appears well-developed  HENT:   Head: Normocephalic and atraumatic  Eyes: Conjunctivae and EOM are normal    Neck: Neck supple  Pulmonary/Chest: Effort normal    Musculoskeletal:        Right knee: He exhibits no effusion  Neurological: He is alert and oriented to person, place, and time  Skin: Skin is warm  Psychiatric: He has a normal mood and affect  Nursing note and vitals reviewed  Right Knee Exam     Tenderness   The patient is experiencing no tenderness  Range of Motion   The patient has normal right knee ROM  Muscle Strength     The patient has normal right knee strength  Tests   Toro:  Medial - negative Lateral - negative  Varus: negative  Valgus: negative  Patellar Apprehension: negative    Other   Erythema: absent  Scars: absent  Sensation: normal  Pulse: present  Swelling: none  Other tests: no effusion present    Comments:  Crepitation in the patellofemoral joint            I have personally reviewed pertinent films in PACS and my interpretation is Right knee with good alignment  Mild to moderate osteoarthritis       Large joint arthrocentesis  Date/Time: 10/2/2018 11:42 AM  Consent given by: patient  Site marked: site marked  Timeout: Immediately prior to procedure a time out was called to verify the correct patient, procedure, equipment, support staff and site/side marked as required   Supporting Documentation  Indications: pain   Procedure Details  Location: knee - R knee  Preparation: Patient was prepped and draped in the usual sterile fashion  Needle size: 22 G  Ultrasound guidance: no  Approach: anterolateral  Medications administered: 7 mL lidocaine (PF) 1 %; 6 mg betamethasone acetate-betamethasone sodium phosphate 6 (3-3) mg/mL    Patient tolerance: patient tolerated the procedure well with no immediate complications  Dressing:  Sterile dressing applied

## 2018-10-02 NOTE — ASSESSMENT & PLAN NOTE
Findings consistent with right knee osteoarthritis  Discussed findings and treatment options with the patient  I provided patient cortisone injection in the right knee, which patient tolerated well  I advised patient to continue low-impact exercises and avoid repetitive squatting, kneeling, or climbing  Patient has kidney issue and cannot take NSAIDs  I advised patient to use cold compress today  If patient's symptoms persist despite the injection, we may have to tried [employer] supplement injections  All patient's questions were answered to his satisfaction  This note is created using dictation transcription  It may contain typographical errors, grammatical errors, improperly dictated words, background noise and other errors

## 2018-10-02 NOTE — PROGRESS NOTES
Daily Note     Today's date: 10/2/2018  Patient name: Lacey Jaeger  :   MRN: [id number]  Referring provider: Moira Magallon DO  Dx:   Encounter Diagnosis     ICD-10-CM    1  Greater trochanteric bursitis, left M70 62                 Subjective: Patient reports that he continues to have R knee pain but that he feels his hip is 20% improved since starting PT  Reports that he has a f/u with ortho regarding his knee pain later today  Objective: See treatment diary below  Manuals  10/2    IASTM L ITB       tiger tail 5' tiger tail 5' 8'    US greater troch       8'  1 5 w/cm2 8'  1 5 w/cm2                               Exercise Diary     102    Bike  8' 8' 8' 10' 10 10' 10'  Level 4 10'    B Hamstring str w/ strap 30"x3 3 3 4 4 4 30"x4 30"x4  b/l 4    B ITB str w/ strap 30"x3 3 3 4 4 4 manual  30"x4 manual  30"x4 4    B Prone quad str w/ strap 30"x3 3 3 4 4 4 30"x4 30"x4  b/l 4    B Piriformis str (as able)  30"x3 3 4         Prone Press Up 5"x10 5"x10 5"x10 10 5"x10 20 HEP      AB             AB w/ bridge             AB w/ hip add  20 20          AB w/ hip abd  20 20          Prone hip ext             Prone hip ext, kf  20 20 20 30 30  NP     SL hip abd             Squats over mat  20     Leg press  30#  3x10 HOLD     Stork kicks     Blue tb x20ea 20  NP     sidestepping      4 laps 6 laps  green NP     Clamshell in sidelying       Green  2x10 NP                                                                      Modalities                                         Assessment: continued to defer some therex as listed secondary to knee pain  Plan to resume full program as loretta NV  Plan: Continue treatment as per PT plan of care

## 2018-10-03 ENCOUNTER — OFFICE VISIT (OUTPATIENT)
Dept: INTERNAL MEDICINE CLINIC | Facility: CLINIC | Age: 58
End: 2018-10-03
Payer: COMMERCIAL

## 2018-10-03 VITALS
BODY MASS INDEX: 28.25 KG/M2 | DIASTOLIC BLOOD PRESSURE: 84 MMHG | HEIGHT: 76 IN | HEART RATE: 88 BPM | SYSTOLIC BLOOD PRESSURE: 150 MMHG | OXYGEN SATURATION: 96 % | WEIGHT: 232 LBS

## 2018-10-03 DIAGNOSIS — M25.561 CHRONIC PAIN OF RIGHT KNEE: ICD-10-CM

## 2018-10-03 DIAGNOSIS — G89.29 CHRONIC PAIN OF RIGHT KNEE: ICD-10-CM

## 2018-10-03 DIAGNOSIS — L02.512 ABSCESS OF LEFT RING FINGER: Primary | ICD-10-CM

## 2018-10-03 DIAGNOSIS — Z91.030 ALLERGY TO BEE STING: ICD-10-CM

## 2018-10-03 DIAGNOSIS — Z23 NEED FOR INFLUENZA VACCINATION: ICD-10-CM

## 2018-10-03 PROCEDURE — 90682 RIV4 VACC RECOMBINANT DNA IM: CPT

## 2018-10-03 PROCEDURE — 99213 OFFICE O/P EST LOW 20 MIN: CPT | Performed by: NURSE PRACTITIONER

## 2018-10-03 PROCEDURE — 90471 IMMUNIZATION ADMIN: CPT

## 2018-10-03 RX ORDER — SULFAMETHOXAZOLE AND TRIMETHOPRIM 800; 160 MG/1; MG/1
1 TABLET ORAL EVERY 12 HOURS SCHEDULED
Qty: 6 TABLET | Refills: 0 | Status: SHIPPED | OUTPATIENT
Start: 2018-10-03 | End: 2018-10-06

## 2018-10-03 RX ORDER — EPINEPHRINE 0.3 MG/.3ML
0.3 INJECTION SUBCUTANEOUS ONCE
Qty: 2 EACH | Refills: 0 | Status: SHIPPED | OUTPATIENT
Start: 2018-10-03 | End: 2019-04-08 | Stop reason: SDUPTHER

## 2018-10-03 NOTE — PROGRESS NOTES
Assessment/Plan:    Apply warm compresses to left finger 4-5 times a day for 20 minutes  Take antibiotic as prescribed  RTO for any worsening redness, pain, swelling, or fever  Diagnoses and all orders for this visit:    Abscess of left ring finger  -     sulfamethoxazole-trimethoprim (BACTRIM DS) 800-160 mg per tablet; Take 1 tablet by mouth every 12 (twelve) hours for 3 days    Chronic pain of right knee    Need for influenza vaccination  -     influenza vaccine, 2418-9358, quadrivalent, recombinant, PF, 0 5 mL, for patients 18 yr+ (FLUBLOK)    Allergy to bee sting  -     EPINEPHrine (EPIPEN 2-NICOLE) 0 3 mg/0 3 mL SOAJ; Inject 0 3 mL (0 3 mg total) into a muscle once for 1 dose          Subjective:      Patient ID: Tete Hinojosa is a 59 y o  male  Here for right knee pain follow up   Received steroid injection yesterday   Knee pain is much better, very minimal pain   Left hip bursitis is about 30% better after 9 sessions of physical therapy     He also has complaints of left ring finger swelling  Noticed it a week ago, thought it was a bug bite  But the swelling and redness has increased   Denies any fever or drainage                 The following portions of the patient's history were reviewed and updated as appropriate: allergies, current medications, past family history, past medical history, past social history, past surgical history and problem list     Review of Systems   Constitutional: Negative  Respiratory: Negative  Cardiovascular: Negative  Musculoskeletal:        Improving right knee pain  Left hip pain   Skin: Positive for wound  Objective:      /84   Pulse 88   Ht 6' 4" (1 93 m)   Wt 105 kg (232 lb)   SpO2 96%   BMI 28 24 kg/m²          Physical Exam   Constitutional: He is oriented to person, place, and time  He appears well-developed and well-nourished  Cardiovascular: Normal rate, regular rhythm and normal heart sounds      Pulmonary/Chest: Effort normal and breath sounds normal    Musculoskeletal:        Right knee: He exhibits normal range of motion and no swelling  Neurological: He is alert and oriented to person, place, and time  Skin:   Area of erythema at the base of the left ring finger with a raised center with a collection of pus under the skin    Psychiatric: He has a normal mood and affect  His behavior is normal    Vitals reviewed

## 2018-10-04 ENCOUNTER — OFFICE VISIT (OUTPATIENT)
Dept: PHYSICAL THERAPY | Facility: CLINIC | Age: 58
End: 2018-10-04
Payer: COMMERCIAL

## 2018-10-04 DIAGNOSIS — M70.62 GREATER TROCHANTERIC BURSITIS, LEFT: Primary | ICD-10-CM

## 2018-10-04 PROCEDURE — 97110 THERAPEUTIC EXERCISES: CPT

## 2018-10-04 PROCEDURE — 97140 MANUAL THERAPY 1/> REGIONS: CPT

## 2018-10-04 NOTE — PROGRESS NOTES
Daily Note     Today's date: 10/4/2018  Patient name: Mala Cabrera  :   MRN: [id number]  Referring provider: Dee Mcnulty DO  Dx:   Encounter Diagnosis     ICD-10-CM    1  Greater trochanteric bursitis, left M70 62                 Subjective: Patient reports the left hip "doesn't feel bad "  Patient reports he is feeling 50% better since attending PT  Patient reports left hip pain is the worst in the morning however he is now able to lay on his left side for approximately a half hour  Patient reports he had a cortisone injection in the right knee on Tuesday and is noticing a significant improvement in pain since this  Objective: See treatment diary below  Manual therapy is performed by PT      Manuals 8/27 8/30 9/4 9/7 9/11 9/14 9/21 9/28 10/2 10   IASTM L ITB       tiger tail 5' tiger tail 5' 8' NP   US greater troch       8'  1 5 w/cm2 8'  1 5 w/cm2  NP   Posterior capsule mob          5'   Long axis distraction          5'   Exercise Diary    9/4 9/7 9/11 9/14 9/21 9/28 10/2 104   Bike  8' 8' 8' 10' 10 10' 10'  Level 4 10'    B Hamstring str w/ strap 30"x3 3 3 4 4 4 30"x4 30"x4  b/l 4 30"x4   B ITB str w/ strap 30"x3 3 3 4 4 4 manual  30"x4 manual  30"x4 4 manual  30"x4   B Prone quad str w/ strap 30"x3 3 3 4 4 4 30"x4 30"x4  b/l 4 30"x4   B Piriformis str (as able)  30"x3 3 4         Prone Press Up 5"x10 5"x10 5"x10 10 5"x10 20 HEP      AB             AB w/ bridge             AB w/ hip add  20 20          AB w/ hip abd  20 20          Prone hip ext             Prone hip ext, kf  20 20 20 30 30  NP     SL hip abd             Squats over mat  20     Leg press  30#  3x10 HOLD     Stork kicks     Blue tb x20ea 20  NP     sidestepping      4 laps 6 laps  green NP     Clamshell in sidelying       Green  2x10 NP  Blue  5"x20                                                                    Modalities                                         Assessment: Therapeutic exercise program and manual therapy is tolerated well without complaints  Plan: Continue treatment as per PT plan of care

## 2018-10-08 ENCOUNTER — EVALUATION (OUTPATIENT)
Dept: PHYSICAL THERAPY | Facility: CLINIC | Age: 58
End: 2018-10-08
Payer: COMMERCIAL

## 2018-10-08 ENCOUNTER — OFFICE VISIT (OUTPATIENT)
Dept: URGENT CARE | Age: 58
End: 2018-10-08
Payer: COMMERCIAL

## 2018-10-08 ENCOUNTER — TELEPHONE (OUTPATIENT)
Dept: INTERNAL MEDICINE CLINIC | Facility: CLINIC | Age: 58
End: 2018-10-08

## 2018-10-08 VITALS
OXYGEN SATURATION: 96 % | SYSTOLIC BLOOD PRESSURE: 132 MMHG | WEIGHT: 234 LBS | HEIGHT: 76 IN | DIASTOLIC BLOOD PRESSURE: 88 MMHG | RESPIRATION RATE: 18 BRPM | HEART RATE: 92 BPM | BODY MASS INDEX: 28.49 KG/M2 | TEMPERATURE: 97.7 F

## 2018-10-08 DIAGNOSIS — L02.512 ABSCESS OF LEFT RING FINGER: ICD-10-CM

## 2018-10-08 DIAGNOSIS — M70.62 GREATER TROCHANTERIC BURSITIS, LEFT: Primary | ICD-10-CM

## 2018-10-08 DIAGNOSIS — L02.512 ABSCESS OF FINGER OF LEFT HAND: Primary | ICD-10-CM

## 2018-10-08 PROCEDURE — 97140 MANUAL THERAPY 1/> REGIONS: CPT | Performed by: PHYSICAL THERAPIST

## 2018-10-08 PROCEDURE — 99213 OFFICE O/P EST LOW 20 MIN: CPT | Performed by: FAMILY MEDICINE

## 2018-10-08 PROCEDURE — G8978 MOBILITY CURRENT STATUS: HCPCS | Performed by: PHYSICAL THERAPIST

## 2018-10-08 PROCEDURE — G8979 MOBILITY GOAL STATUS: HCPCS | Performed by: PHYSICAL THERAPIST

## 2018-10-08 PROCEDURE — 97110 THERAPEUTIC EXERCISES: CPT | Performed by: PHYSICAL THERAPIST

## 2018-10-08 RX ORDER — SULFAMETHOXAZOLE AND TRIMETHOPRIM 800; 160 MG/1; MG/1
1 TABLET ORAL EVERY 12 HOURS SCHEDULED
Qty: 20 TABLET | Refills: 0 | Status: SHIPPED | OUTPATIENT
Start: 2018-10-08 | End: 2018-10-18

## 2018-10-08 RX ORDER — CEPHALEXIN 500 MG/1
500 CAPSULE ORAL EVERY 8 HOURS SCHEDULED
Qty: 21 CAPSULE | Refills: 0 | Status: SHIPPED | OUTPATIENT
Start: 2018-10-08 | End: 2018-10-15

## 2018-10-08 NOTE — PROGRESS NOTES
[facility]        NAME: Gerrie Bosworth is a 59 y o  male  : 1960    MRN: [id number]  DATE: 2018  TIME: 3:46 PM    Assessment and Plan   Abscess of finger of left hand [L02 512]  1  Abscess of finger of left hand  sulfamethoxazole-trimethoprim (BACTRIM DS) 800-160 mg per tablet    cephalexin (KEFLEX) 500 mg capsule   2  Abscess of left ring finger           Patient Instructions     Patient Instructions   Unable to drain wound with 18 gauge needle  No purulent drainage removed  Start antibiotics as prescribed  Continue warm soaks  Follow up with PCP if no improvement  Go to ER with worsening symptoms  Chief Complaint     Chief Complaint   Patient presents with    Hand Pain     left ring finger swollen, x 12, seen pcp rx  bactrim completed  area remains red and swollen         History of Present Illness   Gerrie Bosworth presents to the clinic c/o    This is a 59year old male here today with abscess to left middle finger  He states he was seen by PCP who put him on Bactrim and recommend he do warm soaks  He states he has not seen any improvement  Bactrim was done 2 days ago  No fever, body aches or chills  Review of Systems   Review of Systems   Constitutional: Negative  HENT: Negative  Respiratory: Negative  Cardiovascular: Negative  Skin: Positive for wound  Psychiatric/Behavioral: Negative  Current Medications     Long-Term Prescriptions   Medication Sig Dispense Refill    amLODIPine (NORVASC) 5 mg tablet Take 2 tablets in the morning   180 tablet 1    Ascorbic Acid (VITAMIN C) 1000 MG tablet Take 1 tablet by mouth daily      EPINEPHrine (EPIPEN 2-NICOLE) 0 3 mg/0 3 mL SOAJ Inject 0 3 mL (0 3 mg total) into a muscle once for 1 dose 2 each 0    escitalopram (LEXAPRO) 20 mg tablet Take 1 tablet (20 mg total) by mouth daily 90 tablet 1    lidocaine (LIDODERM) 5 % Place 1 patch on the skin daily Remove & Discard patch within 12 hours or as directed by MD (Patient not taking: Reported on 10/8/2018 ) 30 patch 0    rosuvastatin (CRESTOR) 5 mg tablet Take 1 tablet (5 mg total) by mouth daily 90 tablet 1       Current Allergies     Allergies as of 10/08/2018 - Reviewed 10/08/2018   Allergen Reaction Noted    Bee venom Edema 09/23/2013    Celecoxib Hives 04/21/2012            The following portions of the patient's history were reviewed and updated as appropriate: allergies, current medications, past family history, past medical history, past social history, past surgical history and problem list     Objective   /88   Pulse 92   Temp 97 7 °F (36 5 °C) (Temporal)   Resp 18   Ht 6' 4" (1 93 m)   Wt 106 kg (234 lb)   SpO2 96%   BMI 28 48 kg/m²        Physical Exam     Physical Exam   Constitutional: He appears well-developed and well-nourished  Neck: Normal range of motion  Neck supple  Cardiovascular: Normal rate, regular rhythm and normal heart sounds  Pulmonary/Chest: Effort normal and breath sounds normal    Skin:   Left ring finger: pea size abscess with white purulent appear discharge within site, site is fluctuant  Attempted to drain area with 18 gauge needle  Small amount of serosanguinous/ white drainage from site  Dressed with bandaide and triple antibiotic ointment  Nursing note and vitals reviewed

## 2018-10-08 NOTE — PATIENT INSTRUCTIONS
Unable to drain wound with 18 gauge needle  No purulent drainage removed  Start antibiotics as prescribed  Continue warm soaks  Follow up with PCP if no improvement  Go to ER with worsening symptoms

## 2018-10-08 NOTE — PROGRESS NOTES
PT Evaluation     Today's date: 10/8/2018  Patient name: Robb Cortes  :   MRN: [id number]  Referring provider: Afia Jara DO  Dx:   Encounter Diagnosis     ICD-10-CM    1  Greater trochanteric bursitis, left M70 62                   Assessment  Functional limitations: limited sleeping tolerance  Assessment details: Pt is being treated with outpatient PT  They have made good gains in rom, strength, pain reduction and functional mobility but continues to have deficits  They will benefit from continued skilled PT to address these deficits  Recommend 2-3 more sessions of PT for continued manual tx and to progress his strength training program and then progress to and independent hep  Thank you for this referral       Understanding of Dx/Px/POC: good   Prognosis: good    Goals  STG - 3 weeks  1  Independent with HEP-met  2  Tolerate sleeping through the night with no more than 2 interruptions due to pain -met    LTG - 6 weeks  1  Increase lower abdominal and B hip strength by at least one MMT grade to indicate improved core stability  2  Tolerate sleeping through the night without interruption due to pain  3  Normalize BLE flexibility to promote good body mechanics during daily tasks  Plan  Patient would benefit from: skilled physical therapy  Planned modality interventions: cryotherapy and low level laser therapy  Planned therapy interventions: manual therapy, patient education, postural training, therapeutic activities, therapeutic exercise, strengthening, stretching and home exercise program  Other planned therapy interventions: IASTM, kinesiotaping  Frequency: 2x week  Duration in weeks: 4  Plan of Care expiration date: 10/8/2018  Treatment plan discussed with: patient        Subjective Evaluation    History of Present Illness  Date of onset: 2018  Mechanism of injury: Pt reports sig pain reduction since starting PT    Reports that he is better able to sleep on side without disturbances at night from pain  Reports that he currently has pain when he gets up at night to go to the bathroom but that his pain resolves within a few minutes of walking        Pain  Current pain ratin  At best pain ratin  At worst pain rating: 3  Location: L lateral hip radiating along IT band  Quality: dull ache  Relieving factors: change in position and medications  Aggravating factors: stair climbing  Progression: improved    Social Support    Employment status: not working (retired)  Exercise history: treadmill      Diagnostic Tests  X-ray: abnormal  Bone scan: normal  Treatments  No previous or current treatments  Patient Goals  Patient goals for therapy: decreased pain          Objective     Palpation   Left   Tenderness of the TFL  Tenderness     Left Hip   Tenderness in the greater trochanter  Additional Tenderness Details  TTP at L ITB    Active Range of Motion     Lumbar   Flexion: Active lumbar flexion: 75%   Extension: Active lumbar extension: 50%   Left lateral flexion: WFL  Right lateral flexion: WFL  Left rotation: WFL  Right rotation: [facility]    Additional Active Range of Motion Details  Increased LLE pain with forward flexion of lumbar spine    Passive Range of Motion     Additional Passive Range of Motion Details  Hamstrings B at 90-90 = -42  L ITB = moderately restricted  B Piriformis = min restricted  Prone quad/hip flexor = min restricted    Strength/Myotome Testing     Left Hip   Planes of Motion   Flexion: 5  Extension: 4+  Abduction: 4+    Right Hip   Planes of Motion   Flexion: 4+  Extension: 4+  Abduction: 4+    Additional Strength Details  Upper abs = 5/5  Lower abs = <3/5 with poor recruitment/motor control    Tests     Left Hip   Positive Jf  Flowsheet Rows      Most Recent Value   PT/OT G-Codes   Current Score  71   Projected Score  74        bjective: See treatment diary below  Manual therapy is performed by PT      Manuals 10/8         10/4   IASTM L ITB NP   US greater troch          NP   Posterior capsule mob 5'         5'   Long axis distraction 5'         5'   Exercise Diary           10/4   Bike 10'            B Hamstring str w/ strap 30"x4         30"x4   B ITB str w/ strap manual         manual  30"x4   B Prone quad str w/ strap 30"x4         30"x4   B Piriformis str (as able)             Prone Press Up             AB             AB w/ bridge 5"x30            AB w/ hip add             AB w/ hip abd             Prone hip ext             Prone hip ext, kf             SL hip abd             Squats over mat             Stork kicks             sidestepping             Clamshell in sidelying Blue tb 5"x20         Blue  5"x20                                                                    Modalities

## 2018-10-08 NOTE — TELEPHONE ENCOUNTER
Pt called in to update you on his finger - he states it did not get any better so he went to [facility] (see note) and they were unable to drain it; they prescribed more antibiotics and told him if it doesn't get any better to FUP with his PCP in 1 week that he may need to see a hand surgeon  Marlyn Alfaro  He would like your opinion on what to do - please advise, ty

## 2018-10-09 DIAGNOSIS — L02.512 ABSCESS OF FINGER OF LEFT HAND: Primary | ICD-10-CM

## 2018-10-09 NOTE — TELEPHONE ENCOUNTER
I would see how the second antibiotics are working, in the meantime I can give him a referral to general surgery and he can schedule an appointment  The referral is in the computer, please give him the information

## 2018-10-10 ENCOUNTER — APPOINTMENT (OUTPATIENT)
Dept: PHYSICAL THERAPY | Facility: CLINIC | Age: 58
End: 2018-10-10
Payer: COMMERCIAL

## 2018-10-12 ENCOUNTER — OFFICE VISIT (OUTPATIENT)
Dept: PHYSICAL THERAPY | Facility: CLINIC | Age: 58
End: 2018-10-12
Payer: COMMERCIAL

## 2018-10-12 DIAGNOSIS — M70.62 GREATER TROCHANTERIC BURSITIS, LEFT: Primary | ICD-10-CM

## 2018-10-12 PROCEDURE — 97140 MANUAL THERAPY 1/> REGIONS: CPT | Performed by: PHYSICAL THERAPIST

## 2018-10-12 PROCEDURE — 97110 THERAPEUTIC EXERCISES: CPT | Performed by: PHYSICAL THERAPIST

## 2018-10-12 NOTE — PROGRESS NOTES
Daily Note     Today's date: 10/12/2018  Patient name: Deysi Pimentel  :   MRN: [id number]  Referring provider: Gorge Mccarthy DO  Dx:   Encounter Diagnosis     ICD-10-CM    1  Greater trochanteric bursitis, left M70 62                   Subjective: pt reports 1/10 pain upon arrival and that he feels he is steadily improving but is concerned about his ability to walk for extended periods of time while on vacation in [place]  Objective: See treatment diary       bjective: See treatment diary below  Manual therapy is performed by PT  Manuals 10/8 10/12        10/4   IASTM L ITB          NP   US greater troch          NP   Posterior capsule mob 5' 5'        5'   Long axis distraction 5' 5'        5'   Exercise Diary           10/4   Bike 10' 10'           B Hamstring str w/ strap 30"x4 4        30"x4   B ITB str w/ strap manual manual        manual  30"x4   B Prone quad str w/ strap 30"x4 4        30"x4   B Piriformis str (as able)             Prone Press Up             AB             AB w/ bridge 5"x30 30           AB w/ hip add             AB w/ hip abd             Prone hip ext             Prone hip ext, kf             SL hip abd             Squats over mat             Stork kicks             sidestepping             Clamshell in sidelying Blue tb 5"x20 Black tb x30        Blue  5"x20                                                                    Modalities                                               Assessment: Pogressed resistance and reps and listed with no complaints other then slight fatigue not pain reported with manual tx  Plan: Pt will be away in [place] for 1 week, plan to f/u at that time and if continued improvements then plan to DC to hep

## 2018-11-16 DIAGNOSIS — E78.5 HYPERLIPIDEMIA, UNSPECIFIED HYPERLIPIDEMIA TYPE: ICD-10-CM

## 2018-11-16 RX ORDER — ROSUVASTATIN CALCIUM 5 MG/1
5 TABLET, COATED ORAL DAILY
Qty: 90 TABLET | Refills: 0 | Status: SHIPPED | OUTPATIENT
Start: 2018-11-16 | End: 2019-05-17 | Stop reason: SDUPTHER

## 2018-11-16 NOTE — PROGRESS NOTES
Pt has achieved a majority of their goals set at the start of therapy, is independent with their hep, and will be Dc'd at this time   Thank you for this referral

## 2019-01-22 ENCOUNTER — APPOINTMENT (OUTPATIENT)
Dept: LAB | Facility: CLINIC | Age: 59
End: 2019-01-22
Payer: COMMERCIAL

## 2019-01-22 DIAGNOSIS — E78.5 HYPERLIPIDEMIA, UNSPECIFIED HYPERLIPIDEMIA TYPE: ICD-10-CM

## 2019-01-22 DIAGNOSIS — I10 ESSENTIAL HYPERTENSION: ICD-10-CM

## 2019-01-22 LAB
ALBUMIN SERPL BCP-MCNC: 3.9 G/DL (ref 3.5–5)
ALP SERPL-CCNC: 81 U/L (ref 46–116)
ALT SERPL W P-5'-P-CCNC: 49 U/L (ref 12–78)
ANION GAP SERPL CALCULATED.3IONS-SCNC: 5 MMOL/L (ref 4–13)
AST SERPL W P-5'-P-CCNC: 29 U/L (ref 5–45)
BILIRUB SERPL-MCNC: 1.41 MG/DL (ref 0.2–1)
BUN SERPL-MCNC: 23 MG/DL (ref 5–25)
CALCIUM SERPL-MCNC: 8.7 MG/DL (ref 8.3–10.1)
CHLORIDE SERPL-SCNC: 104 MMOL/L (ref 100–108)
CHOLEST SERPL-MCNC: 160 MG/DL (ref 50–200)
CO2 SERPL-SCNC: 29 MMOL/L (ref 21–32)
CREAT SERPL-MCNC: 0.92 MG/DL (ref 0.6–1.3)
GFR SERPL CREATININE-BSD FRML MDRD: 90 ML/MIN/1.73SQ M
GLUCOSE P FAST SERPL-MCNC: 99 MG/DL (ref 65–99)
HDLC SERPL-MCNC: 55 MG/DL (ref 40–60)
LDLC SERPL CALC-MCNC: 92 MG/DL (ref 0–100)
POTASSIUM SERPL-SCNC: 3.7 MMOL/L (ref 3.5–5.3)
PROT SERPL-MCNC: 7.3 G/DL (ref 6.4–8.2)
SODIUM SERPL-SCNC: 138 MMOL/L (ref 136–145)
TRIGL SERPL-MCNC: 65 MG/DL

## 2019-01-22 PROCEDURE — 36415 COLL VENOUS BLD VENIPUNCTURE: CPT

## 2019-01-22 PROCEDURE — 80053 COMPREHEN METABOLIC PANEL: CPT

## 2019-01-22 PROCEDURE — 80061 LIPID PANEL: CPT

## 2019-01-29 ENCOUNTER — OFFICE VISIT (OUTPATIENT)
Dept: INTERNAL MEDICINE CLINIC | Facility: CLINIC | Age: 59
End: 2019-01-29
Payer: COMMERCIAL

## 2019-01-29 VITALS
WEIGHT: 236.6 LBS | OXYGEN SATURATION: 97 % | DIASTOLIC BLOOD PRESSURE: 82 MMHG | SYSTOLIC BLOOD PRESSURE: 130 MMHG | HEIGHT: 76 IN | BODY MASS INDEX: 28.81 KG/M2 | RESPIRATION RATE: 16 BRPM | HEART RATE: 71 BPM

## 2019-01-29 DIAGNOSIS — M25.569 KNEE PAIN, UNSPECIFIED CHRONICITY, UNSPECIFIED LATERALITY: ICD-10-CM

## 2019-01-29 DIAGNOSIS — F41.9 ANXIETY: ICD-10-CM

## 2019-01-29 DIAGNOSIS — I10 ESSENTIAL HYPERTENSION: Primary | ICD-10-CM

## 2019-01-29 DIAGNOSIS — Z13.1 SCREENING FOR DIABETES MELLITUS: ICD-10-CM

## 2019-01-29 DIAGNOSIS — E78.5 HYPERLIPIDEMIA, UNSPECIFIED HYPERLIPIDEMIA TYPE: ICD-10-CM

## 2019-01-29 PROCEDURE — 99214 OFFICE O/P EST MOD 30 MIN: CPT | Performed by: INTERNAL MEDICINE

## 2019-01-29 PROCEDURE — 3075F SYST BP GE 130 - 139MM HG: CPT | Performed by: INTERNAL MEDICINE

## 2019-01-29 PROCEDURE — 3008F BODY MASS INDEX DOCD: CPT | Performed by: INTERNAL MEDICINE

## 2019-01-29 PROCEDURE — 1036F TOBACCO NON-USER: CPT | Performed by: INTERNAL MEDICINE

## 2019-01-29 PROCEDURE — 3079F DIAST BP 80-89 MM HG: CPT | Performed by: INTERNAL MEDICINE

## 2019-01-29 NOTE — PROGRESS NOTES
Assessment/Plan:           Problem List Items Addressed This Visit        Cardiovascular and Mediastinum    Essential hypertension - Primary     Hypertension - controlled, I have counseled patient following healthy balance diet, I would like the patient reduce sodium, exercise routinely, I would like the patient continued the med current medical regiment and we will continue to monitor  Musculoskeletal and Integument    Malignant melanoma of skin ([facility] [address] )       Other    Screening for diabetes mellitus    Relevant Orders    Hemoglobin A1C    Anxiety     Currently stable doing well continue with current medical regiment will continue monitor  Hyperlipidemia     Hyperlipidemia controlled continue with current medical regiment recommend a low-cholesterol diet and recommend routine exercise we will continue to monitor the progress  Relevant Orders    Comprehensive metabolic panel    Lipid Panel with Direct LDL reflex    Knee pain     I wouold like to get the opinion of orthopedics Dr [facility] I did review the x-ray findings with the patient  Relevant Orders    Ambulatory referral to Orthopedic Surgery          Return to office 6  months  call if any problems  Subjective:      Patient ID: Henry Fontenot is a 59 y o  male  HPI 62-year old male coming in for a follow up visit regarding essential hypertension, hyperlipidemia, anxiety, screening for diabetes and the pain; The patient reports me compliant taking medications without untoward side effects the  The patient is here to review his medical condition, update me on the medical condition and the patient reports me no hospitalizations and no ER visits  He reports me he developed right knee pain severe at he needed to see orthopedics twice since last visit he has very to satisfy with the orthopedic doctor he had seen ventral he did get a steroid shot which helped to improve his symptoms    He has not undergone MRI symptoms are very mild at this point  He reports me his anxiety levels are stable  the reports right knee minimal pain at this point in time  The following portions of the patient's history were reviewed and updated as appropriate: allergies, current medications, past family history, past medical history, past social history, past surgical history and problem list     Review of Systems   Constitutional: Negative for activity change, appetite change and unexpected weight change  HENT: Negative for congestion and postnasal drip  Eyes: Negative for visual disturbance  Respiratory: Negative for cough and shortness of breath  Cardiovascular: Negative for chest pain  Gastrointestinal: Negative for abdominal pain, diarrhea, nausea and vomiting  Musculoskeletal:         Knee pain   Neurological: Negative for dizziness, light-headedness and headaches  Hematological: Negative for adenopathy  Psychiatric/Behavioral: Negative for suicidal ideas  The patient is not nervous/anxious  Objective:    No Follow-up on file  No results found  Allergies   Allergen Reactions    Bee Venom Edema    Celecoxib Hives     Reaction Date: 21Nov2011; Category: Allergy; Past Medical History:   Diagnosis Date    [facility] ([facility] )     Hyperlipidemia     Malignant melanoma of skin ([facility] )     last assessed 10/25/17, resolved 4/20/15     Past Surgical History:   Procedure Laterality Date    COLONOSCOPY  2012    Dr Violet Dancer   Impression 1/12/15    HEMORROIDECTOMY      SKIN LESION EXCISION Right     Elbow     TONSILECTOMY AND ADNOIDECTOMY       Current Outpatient Prescriptions on File Prior to Visit   Medication Sig Dispense Refill    amLODIPine (NORVASC) 5 mg tablet Take 2 tablets in the morning   180 tablet 1    Ascorbic Acid (VITAMIN C) 1000 MG tablet Take 1 tablet by mouth daily      escitalopram (LEXAPRO) 20 mg tablet Take 1 tablet (20 mg total) by mouth daily 90 tablet 1    lidocaine (LIDODERM) 5 % Place 1 patch on the skin daily Remove & Discard patch within 12 hours or as directed by MD 30 patch 0    rosuvastatin (CRESTOR) 5 mg tablet Take 1 tablet (5 mg total) by mouth daily 90 tablet 0    EPINEPHrine (EPIPEN 2-NICOLE) 0 3 mg/0 3 mL SOAJ Inject 0 3 mL (0 3 mg total) into a muscle once for 1 dose 2 each 0     No current facility-administered medications on file prior to visit  Family History   Problem Relation Age of Onset    Diabetes Mother     Skin cancer Mother     Coronary artery disease Father      Social History     Social History    Marital status: /Civil Union     Spouse name: N/A    Number of children: N/A    Years of education: N/A     Occupational History    Not on file       Social History Main Topics    Smoking status: Never Smoker    Smokeless tobacco: Never Used    Alcohol use Yes      Comment: Social     Drug use: No    Sexual activity: Not on file     Other Topics Concern    Not on file     Social History Narrative    No narrative on file     Vitals:    01/29/19 0829   BP: 130/82   BP Location: Left arm   Patient Position: Sitting   Cuff Size: Standard   Pulse: 71   Resp: 16   SpO2: 97%   Weight: 107 kg (236 lb 9 6 oz)   Height: 6' 4" (1 93 m)     Results for orders placed or performed in visit on 01/22/19   Comprehensive metabolic panel   Result Value Ref Range    Sodium 138 136 - 145 mmol/L    Potassium 3 7 3 5 - 5 3 mmol/L    Chloride 104 100 - 108 mmol/L    CO2 29 21 - 32 mmol/L    ANION GAP 5 4 - 13 mmol/L    BUN 23 5 - 25 mg/dL    Creatinine 0 92 0 60 - 1 30 mg/dL    Glucose, Fasting 99 65 - 99 mg/dL    Calcium 8 7 8 3 - 10 1 mg/dL    AST 29 5 - 45 U/L    ALT 49 12 - 78 U/L    Alkaline Phosphatase 81 46 - 116 U/L    Total Protein 7 3 6 4 - 8 2 g/dL    Albumin 3 9 3 5 - 5 0 g/dL    Total Bilirubin 1 41 (H) 0 20 - 1 00 mg/dL    eGFR 90 ml/min/1 73sq m   Lipid Panel with Direct LDL reflex   Result Value Ref Range    Cholesterol 160 50 - 200 mg/dL    Triglycerides 65 <=150 mg/dL HDL, Direct 55 40 - 60 mg/dL    LDL Calculated 92 0 - 100 mg/dL     Weight (last 2 days)     Date/Time   Weight    01/29/19 0829  107 (236 6)            Body mass index is 28 8 kg/m²  BP      Temp      Pulse     Resp      SpO2        Vitals:    01/29/19 0829   Weight: 107 kg (236 lb 9 6 oz)     Vitals:    01/29/19 0829   Weight: 107 kg (236 lb 9 6 oz)       /82 (BP Location: Left arm, Patient Position: Sitting, Cuff Size: Standard)   Pulse 71   Resp 16   Ht 6' 4" (1 93 m)   Wt 107 kg (236 lb 9 6 oz)   SpO2 97%   BMI 28 80 kg/m²          Physical Exam   Constitutional: He appears well-developed and well-nourished  No distress  HENT:   Head: Normocephalic and atraumatic  Right Ear: External ear normal    Left Ear: External ear normal    Mouth/Throat: Oropharynx is clear and moist    Eyes: Pupils are equal, round, and reactive to light  Conjunctivae are normal  Right eye exhibits no discharge  Left eye exhibits no discharge  No scleral icterus  Neck: Neck supple  Cardiovascular: Normal rate, regular rhythm and normal heart sounds  Exam reveals no gallop and no friction rub  No murmur heard  Pulmonary/Chest: No respiratory distress  He has no wheezes  He has no rales  Abdominal: Soft  Bowel sounds are normal  He exhibits no distension and no mass  There is no tenderness  There is no rebound and no guarding  Musculoskeletal: He exhibits no edema or deformity  Lymphadenopathy:     He has no cervical adenopathy  Neurological: He is alert  Skin: He is not diaphoretic  Psychiatric: He has a normal mood and affect

## 2019-01-30 NOTE — ASSESSMENT & PLAN NOTE
I wouold like to get the opinion of orthopedics Dr Ronan Myles I did review the x-ray findings with the patient

## 2019-03-10 DIAGNOSIS — F41.9 ANXIETY: ICD-10-CM

## 2019-03-10 DIAGNOSIS — I10 ESSENTIAL HYPERTENSION: ICD-10-CM

## 2019-03-11 RX ORDER — AMLODIPINE BESYLATE 5 MG/1
TABLET ORAL
Qty: 180 TABLET | Refills: 1 | Status: SHIPPED | OUTPATIENT
Start: 2019-03-11 | End: 2019-09-03 | Stop reason: SDUPTHER

## 2019-03-11 RX ORDER — ESCITALOPRAM OXALATE 20 MG/1
TABLET ORAL
Qty: 90 TABLET | Refills: 1 | Status: SHIPPED | OUTPATIENT
Start: 2019-03-11 | End: 2019-08-14 | Stop reason: SDUPTHER

## 2019-04-02 ENCOUNTER — OFFICE VISIT (OUTPATIENT)
Dept: OBGYN CLINIC | Facility: HOSPITAL | Age: 59
End: 2019-04-02
Payer: COMMERCIAL

## 2019-04-02 VITALS
BODY MASS INDEX: 29.1 KG/M2 | SYSTOLIC BLOOD PRESSURE: 126 MMHG | HEART RATE: 85 BPM | WEIGHT: 239 LBS | HEIGHT: 76 IN | DIASTOLIC BLOOD PRESSURE: 88 MMHG

## 2019-04-02 DIAGNOSIS — M25.561 CHRONIC PAIN OF RIGHT KNEE: ICD-10-CM

## 2019-04-02 DIAGNOSIS — M54.50 LUMBAR PAIN: Primary | ICD-10-CM

## 2019-04-02 DIAGNOSIS — M17.11 PRIMARY OSTEOARTHRITIS OF RIGHT KNEE: ICD-10-CM

## 2019-04-02 DIAGNOSIS — G89.29 CHRONIC PAIN OF RIGHT KNEE: ICD-10-CM

## 2019-04-02 PROCEDURE — 99244 OFF/OP CNSLTJ NEW/EST MOD 40: CPT | Performed by: ORTHOPAEDIC SURGERY

## 2019-04-02 RX ORDER — METHYLPREDNISOLONE 4 MG/1
TABLET ORAL
Qty: 1 EACH | Refills: 0 | Status: SHIPPED | OUTPATIENT
Start: 2019-04-02 | End: 2019-09-30

## 2019-04-02 RX ORDER — CYCLOBENZAPRINE HCL 10 MG
10 TABLET ORAL 3 TIMES DAILY PRN
Qty: 30 TABLET | Refills: 0 | Status: SHIPPED | OUTPATIENT
Start: 2019-04-02 | End: 2020-10-07 | Stop reason: ALTCHOICE

## 2019-04-08 ENCOUNTER — EVALUATION (OUTPATIENT)
Dept: PHYSICAL THERAPY | Facility: CLINIC | Age: 59
End: 2019-04-08
Payer: COMMERCIAL

## 2019-04-08 DIAGNOSIS — M54.50 LUMBAR PAIN: Primary | ICD-10-CM

## 2019-04-08 DIAGNOSIS — Z91.030 ALLERGY TO BEE STING: ICD-10-CM

## 2019-04-08 PROCEDURE — 97161 PT EVAL LOW COMPLEX 20 MIN: CPT | Performed by: PHYSICAL THERAPIST

## 2019-04-08 RX ORDER — EPINEPHRINE 0.3 MG/.3ML
0.3 INJECTION SUBCUTANEOUS ONCE
Qty: 1 EACH | Refills: 1 | Status: SHIPPED | OUTPATIENT
Start: 2019-04-08 | End: 2019-09-30 | Stop reason: SDUPTHER

## 2019-04-19 ENCOUNTER — APPOINTMENT (OUTPATIENT)
Dept: PHYSICAL THERAPY | Facility: CLINIC | Age: 59
End: 2019-04-19
Payer: COMMERCIAL

## 2019-04-26 ENCOUNTER — OFFICE VISIT (OUTPATIENT)
Dept: PHYSICAL THERAPY | Facility: CLINIC | Age: 59
End: 2019-04-26
Payer: COMMERCIAL

## 2019-04-26 DIAGNOSIS — M54.50 LUMBAR PAIN: Primary | ICD-10-CM

## 2019-04-26 PROCEDURE — 97140 MANUAL THERAPY 1/> REGIONS: CPT | Performed by: PHYSICAL THERAPIST

## 2019-04-26 PROCEDURE — 97110 THERAPEUTIC EXERCISES: CPT | Performed by: PHYSICAL THERAPIST

## 2019-05-17 DIAGNOSIS — E78.5 HYPERLIPIDEMIA, UNSPECIFIED HYPERLIPIDEMIA TYPE: ICD-10-CM

## 2019-05-17 RX ORDER — ROSUVASTATIN CALCIUM 5 MG/1
TABLET, COATED ORAL
Qty: 90 TABLET | Refills: 0 | Status: SHIPPED | OUTPATIENT
Start: 2019-05-17 | End: 2019-08-08 | Stop reason: SDUPTHER

## 2019-06-25 ENCOUNTER — TRANSCRIBE ORDERS (OUTPATIENT)
Dept: ADMINISTRATIVE | Facility: HOSPITAL | Age: 59
End: 2019-06-25

## 2019-06-25 ENCOUNTER — APPOINTMENT (OUTPATIENT)
Dept: LAB | Facility: CLINIC | Age: 59
End: 2019-06-25
Payer: COMMERCIAL

## 2019-06-25 DIAGNOSIS — Z12.5 SPECIAL SCREENING FOR MALIGNANT NEOPLASM OF PROSTATE: Primary | ICD-10-CM

## 2019-06-25 DIAGNOSIS — Z12.5 SPECIAL SCREENING FOR MALIGNANT NEOPLASM OF PROSTATE: ICD-10-CM

## 2019-06-25 LAB — PSA SERPL-MCNC: 1.5 NG/ML (ref 0–4)

## 2019-06-25 PROCEDURE — 84153 ASSAY OF PSA TOTAL: CPT

## 2019-07-09 ENCOUNTER — OFFICE VISIT (OUTPATIENT)
Dept: UROLOGY | Facility: AMBULATORY SURGERY CENTER | Age: 59
End: 2019-07-09
Payer: COMMERCIAL

## 2019-07-09 VITALS
HEART RATE: 90 BPM | BODY MASS INDEX: 28.52 KG/M2 | WEIGHT: 234.2 LBS | DIASTOLIC BLOOD PRESSURE: 98 MMHG | SYSTOLIC BLOOD PRESSURE: 122 MMHG | HEIGHT: 76 IN

## 2019-07-09 DIAGNOSIS — Z12.5 PROSTATE CANCER SCREENING: Primary | ICD-10-CM

## 2019-07-09 PROCEDURE — 99213 OFFICE O/P EST LOW 20 MIN: CPT | Performed by: NURSE PRACTITIONER

## 2019-07-09 NOTE — PROGRESS NOTES
7/9/2019    Chief Complaint   Patient presents with    Follow-up    Prostate Check     PSA=1 5 (6/25/19)     Assessment and Plan    59 y o  male managed by Dr Tapan Melchor    1  Prostate cancer screening  · PSA performed 06/25/2019 is 1 5  · Digital rectal exam reveals prostate approximately 35 g smooth, firm, nontender  Non-nodular    History of Present Illness  Scott Montoya is a 59 y o  male here for routine prostate cancer screening  Patient denies urinary symptoms including dysuria, hematuria, urinary frequency and urgency  He currently reports no change to his health and is satisfied with his current urinary status  Review of Systems   Constitutional: Negative for chills and fever  Respiratory: Negative for cough and shortness of breath  Cardiovascular: Negative for chest pain  Gastrointestinal: Negative for abdominal distention, abdominal pain, blood in stool, nausea and vomiting  Genitourinary: Negative for difficulty urinating, dysuria, enuresis, flank pain, frequency, hematuria and urgency  Skin: Negative for rash  Urinary Incontinence Screening      Most Recent Value   Urinary Incontinence   Urinary Incontinence? No   Incomplete emptying? No   Urinary frequency? Yes   Urinary urgency? Yes   Urinary hesitancy? No   Dysuria (painful difficult urination)? No   Nocturia (waking up to use the bathroom)? Yes [2x]   Straining (having to push to go)? No   Weak stream?  No   Intermittent stream?  No          AUA SYMPTOM SCORE      Most Recent Value   AUA SYMPTOM SCORE   How often have you had a sensation of not emptying your bladder completely after you finished urinating? 0   How often have you had to urinate again less than two hours after you finished urinating? 1   How often have you found you stopped and started again several times when you urinate?  0   How often have you found it difficult to postpone urination?   1   How often have you had a weak urinary stream?  0 How often have you had to push or strain to begin urination? 0   How many times did you most typically get up to urinate from the time you went to bed at night until the time you got up in the morning?   2   Quality of Life: If you were to spend the rest of your life with your urinary condition just the way it is now, how would you feel about that?  0   AUA SYMPTOM SCORE  4           Past Medical History  Past Medical History:   Diagnosis Date    Cancer ([facility] )     Hyperlipidemia     Malignant melanoma of skin ([facility] )     last assessed 10/25/17, resolved 4/20/15       Past Social History  Past Surgical History:   Procedure Laterality Date    COLONOSCOPY  2012    Dr Beba Florence   Impression 1/12/15    HEMORROIDECTOMY      SKIN LESION EXCISION Right     Elbow     TONSILECTOMY AND ADNOIDECTOMY       Social History     Tobacco Use   Smoking Status Never Smoker   Smokeless Tobacco Never Used       Past Family History  Family History   Problem Relation Age of Onset    Diabetes Mother     Skin cancer Mother     Coronary artery disease Father        Past Social history  Social History     Socioeconomic History    Marital status: /Civil Union     Spouse name: Not on file    Number of children: Not on file    Years of education: Not on file    Highest education level: Not on file   Occupational History    Not on file   Social Needs    Financial resource strain: Not on file    Food insecurity:     Worry: Not on file     Inability: Not on file    Transportation needs:     Medical: Not on file     Non-medical: Not on file   Tobacco Use    Smoking status: Never Smoker    Smokeless tobacco: Never Used   Substance and Sexual Activity    Alcohol use: Yes     Comment: Social     Drug use: No    Sexual activity: Not on file   Lifestyle    Physical activity:     Days per week: Not on file     Minutes per session: Not on file    Stress: Not on file   Relationships    Social connections:     Talks on phone: Not on file     Gets together: Not on file     Attends [employer] service: Not on file     Active member of club or organization: Not on file     Attends meetings of clubs or organizations: Not on file     Relationship status: Not on file    Intimate partner violence:     Fear of current or ex partner: Not on file     Emotionally abused: Not on file     Physically abused: Not on file     Forced sexual activity: Not on file   Other Topics Concern    Not on file   Social History Narrative    Not on file       Current Medications  Current Outpatient Medications   Medication Sig Dispense Refill    amLODIPine (NORVASC) 5 mg tablet TAKE 2 TABLETS IN THE MORNING  180 tablet 1    Ascorbic Acid (VITAMIN C) 1000 MG tablet Take 1 tablet by mouth daily      EPINEPHrine (EPIPEN 2-NICOLE) 0 3 mg/0 3 mL SOAJ Inject 0 3 mL (0 3 mg total) into a muscle once for 1 dose 1 each 1    escitalopram (LEXAPRO) 20 mg tablet TAKE 1 TABLET BY MOUTH EVERY DAY 90 tablet 1    rosuvastatin (CRESTOR) 5 mg tablet TAKE 1 TABLET BY MOUTH EVERY DAY 90 tablet 0    cyclobenzaprine (FLEXERIL) 10 mg tablet Take 1 tablet (10 mg total) by mouth 3 (three) times a day as needed for muscle spasms (Patient not taking: Reported on 7/9/2019) 30 tablet 0    lidocaine (LIDODERM) 5 % Place 1 patch on the skin daily Remove & Discard patch within 12 hours or as directed by MD (Patient not taking: Reported on 7/9/2019) 30 patch 0    methylPREDNISolone 4 MG tablet therapy pack Use as directed on package (Patient not taking: Reported on 7/9/2019) 1 each 0     No current facility-administered medications for this visit  Allergies  Allergies   Allergen Reactions    Bee Venom Edema    Celecoxib Hives     Reaction Date: 21Nov2011; Category: Allergy;           The following portions of the patient's history were reviewed and updated as appropriate: allergies, current medications, past medical history, past social history, past surgical history and problem list       Vitals  Vitals:    07/09/19 1327   BP: 122/98   BP Location: Left arm   Patient Position: Sitting   Cuff Size: Adult   Pulse: 90   Weight: 106 kg (234 lb 3 2 oz)   Height: 6' 4" (1 93 m)     Physical Exam  Physical Exam   Constitutional: He is oriented to person, place, and time  He appears well-nourished  HENT:   Head: Atraumatic  Eyes: EOM are normal    Neck: Neck supple  Cardiovascular: Normal rate and regular rhythm  Pulmonary/Chest: Effort normal and breath sounds normal    Abdominal: Soft  Bowel sounds are normal    Musculoskeletal: Normal range of motion  Neurological: He is alert and oriented to person, place, and time  Skin: Skin is warm and dry  Psychiatric: He has a normal mood and affect  Vitals reviewed  Results  No results found for this or any previous visit (from the past 1 hour(s)) ]  Lab Results   Component Value Date    PSA 1 5 06/25/2019    PSA 1 7 05/22/2018    PSA 1 8 03/30/2017     Lab Results   Component Value Date    GLUCOSE 85 11/05/2015    CALCIUM 8 7 01/22/2019     11/05/2015    K 3 7 01/22/2019    CO2 29 01/22/2019     01/22/2019    BUN 23 01/22/2019    CREATININE 0 92 01/22/2019     Lab Results   Component Value Date    WBC 6 68 08/14/2018    HGB 14 9 08/14/2018    HCT 42 3 08/14/2018    MCV 89 08/14/2018     08/14/2018     Orders  Orders Placed This Encounter   Procedures    PSA, Total Screen     This is a patient instruction: This test is non-fasting  Please drink two glasses of water morning of bloodwork          Standing Status:   Future     Standing Expiration Date:   7/9/2020     MILES Brennan

## 2019-07-09 NOTE — LETTER
July 22, 2019     Ranjan Edge  [address] [facility]  [address] [place] [address]    Patient: Fiona Christopher   YOB: 1960   Date of Visit: 7/9/2019       Dear Dr Goldie Padilla: Thank you for referring Tasia Cannon to me for evaluation  Below are my notes for this consultation  If you have questions, please do not hesitate to call me  I look forward to following your patient along with you  Sincerely,        MILES Huynh        CC: No Recipients  MILES Huynh  7/22/2019  1:44 PM  Incomplete  7/9/2019    Chief Complaint   Patient presents with    Follow-up    Prostate Check     PSA=1 5 (6/25/19)     Assessment and Plan    59 y o  male managed by Dr Cris Umaña    1  Prostate cancer screening  · PSA performed 06/25/2019 is 1 5  · Digital rectal exam reveals prostate approximately 35 g smooth, firm, nontender  Non-nodular    History of Present Illness  Fiona Christopher is a 59 y o  male here for routine prostate cancer screening  Patient denies urinary symptoms including dysuria, hematuria, urinary frequency and urgency  He currently reports no change to his health and is satisfied with his current urinary status  Review of Systems   Constitutional: Negative for chills and fever  Respiratory: Negative for cough and shortness of breath  Cardiovascular: Negative for chest pain  Gastrointestinal: Negative for abdominal distention, abdominal pain, blood in stool, nausea and vomiting  Genitourinary: Negative for difficulty urinating, dysuria, enuresis, flank pain, frequency, hematuria and urgency  Skin: Negative for rash  Urinary Incontinence Screening      Most Recent Value   Urinary Incontinence   Urinary Incontinence? No   Incomplete emptying? No   Urinary frequency? Yes   Urinary urgency? Yes   Urinary hesitancy? No   Dysuria (painful difficult urination)? No   Nocturia (waking up to use the bathroom)?   Yes [2x]   Straining (having to push to go)?  No   Weak stream?  No   Intermittent stream?  No          AUA SYMPTOM SCORE      Most Recent Value   AUA SYMPTOM SCORE   How often have you had a sensation of not emptying your bladder completely after you finished urinating? 0   How often have you had to urinate again less than two hours after you finished urinating? 1   How often have you found you stopped and started again several times when you urinate?  0   How often have you found it difficult to postpone urination? 1   How often have you had a weak urinary stream?  0   How often have you had to push or strain to begin urination? 0   How many times did you most typically get up to urinate from the time you went to bed at night until the time you got up in the morning?   2   Quality of Life: If you were to spend the rest of your life with your urinary condition just the way it is now, how would you feel about that?  0   AUA SYMPTOM SCORE  4           Past Medical History  Past Medical History:   Diagnosis Date    Cancer ([facility] )     Hyperlipidemia     Malignant melanoma of skin ([facility] )     last assessed 10/25/17, resolved 4/20/15       Past Social History  Past Surgical History:   Procedure Laterality Date    COLONOSCOPY  2012    Dr Beatriz Milligan   Impression 1/12/15    HEMORROIDECTOMY      SKIN LESION EXCISION Right     Elbow     TONSILECTOMY AND ADNOIDECTOMY       Social History     Tobacco Use   Smoking Status Never Smoker   Smokeless Tobacco Never Used       Past Family History  Family History   Problem Relation Age of Onset    Diabetes Mother     Skin cancer Mother     Coronary artery disease Father        Past Social history  Social History     Socioeconomic History    Marital status: /Civil Union     Spouse name: Not on file    Number of children: Not on file    Years of education: Not on file    Highest education level: Not on file   Occupational History    Not on file   Social Needs    Financial resource strain: Not on file   [place] insecurity:     Worry: Not on file     Inability: Not on file    Transportation needs:     Medical: Not on file     Non-medical: Not on file   Tobacco Use    Smoking status: Never Smoker    Smokeless tobacco: Never Used   Substance and Sexual Activity    Alcohol use: Yes     Comment: Social     Drug use: No    Sexual activity: Not on file   Lifestyle    Physical activity:     Days per week: Not on file     Minutes per session: Not on file    Stress: Not on file   Relationships    Social connections:     Talks on phone: Not on file     Gets together: Not on file     Attends Restorationist service: Not on file     Active member of club or organization: Not on file     Attends meetings of clubs or organizations: Not on file     Relationship status: Not on file    Intimate partner violence:     Fear of current or ex partner: Not on file     Emotionally abused: Not on file     Physically abused: Not on file     Forced sexual activity: Not on file   Other Topics Concern    Not on file   Social History Narrative    Not on file       Current Medications  Current Outpatient Medications   Medication Sig Dispense Refill    amLODIPine (NORVASC) 5 mg tablet TAKE 2 TABLETS IN THE MORNING   180 tablet 1    Ascorbic Acid (VITAMIN C) 1000 MG tablet Take 1 tablet by mouth daily      EPINEPHrine (EPIPEN 2-NICOLE) 0 3 mg/0 3 mL SOAJ Inject 0 3 mL (0 3 mg total) into a muscle once for 1 dose 1 each 1    escitalopram (LEXAPRO) 20 mg tablet TAKE 1 TABLET BY MOUTH EVERY DAY 90 tablet 1    rosuvastatin (CRESTOR) 5 mg tablet TAKE 1 TABLET BY MOUTH EVERY DAY 90 tablet 0    cyclobenzaprine (FLEXERIL) 10 mg tablet Take 1 tablet (10 mg total) by mouth 3 (three) times a day as needed for muscle spasms (Patient not taking: Reported on 7/9/2019) 30 tablet 0    lidocaine (LIDODERM) 5 % Place 1 patch on the skin daily Remove & Discard patch within 12 hours or as directed by MD (Patient not taking: Reported on 7/9/2019) 30 patch 0    methylPREDNISolone 4 MG tablet therapy pack Use as directed on package (Patient not taking: Reported on 7/9/2019) 1 each 0     No current facility-administered medications for this visit  Allergies  Allergies   Allergen Reactions    Bee Venom Edema    Celecoxib Hives     Reaction Date: 21Nov2011; Category: Allergy; The following portions of the patient's history were reviewed and updated as appropriate: allergies, current medications, past medical history, past social history, past surgical history and problem list       Vitals  Vitals:    07/09/19 1327   BP: 122/98   BP Location: Left arm   Patient Position: Sitting   Cuff Size: Adult   Pulse: 90   Weight: 106 kg (234 lb 3 2 oz)   Height: 6' 4" (1 93 m)     Physical Exam  Physical Exam   Constitutional: He is oriented to person, place, and time  He appears well-nourished  HENT:   Head: Atraumatic  Eyes: EOM are normal    Neck: Neck supple  Cardiovascular: Normal rate and regular rhythm  Pulmonary/Chest: Effort normal and breath sounds normal    Abdominal: Soft  Bowel sounds are normal    Musculoskeletal: Normal range of motion  Neurological: He is alert and oriented to person, place, and time  Skin: Skin is warm and dry  Psychiatric: He has a normal mood and affect  Vitals reviewed        Results  No results found for this or any previous visit (from the past 1 hour(s)) ]  Lab Results   Component Value Date    PSA 1 5 06/25/2019    PSA 1 7 05/22/2018    PSA 1 8 03/30/2017     Lab Results   Component Value Date    GLUCOSE 85 11/05/2015    CALCIUM 8 7 01/22/2019     11/05/2015    K 3 7 01/22/2019    CO2 29 01/22/2019     01/22/2019    BUN 23 01/22/2019    CREATININE 0 92 01/22/2019     Lab Results   Component Value Date    WBC 6 68 08/14/2018    HGB 14 9 08/14/2018    HCT 42 3 08/14/2018    MCV 89 08/14/2018     08/14/2018     Orders  Orders Placed This Encounter   Procedures    PSA, Total Screen     This is a patient instruction: This test is non-fasting  Please drink two glasses of water morning of bloodwork  Standing Status:   Future     Standing Expiration Date:   7/9/2020     Jarred Armenta  7/9/2019  2:00 PM  Sign at close encounter  7/9/2019    Chief Complaint   Patient presents with    Follow-up    Prostate Check     PSA=1 5 (6/25/19)     Assessment and Plan    59 y o  male managed by Dr Hemal Vaca    1  Prostate cancer screening  · PSA performed 06/25/2019 is 1 5  · Digital rectal exam reveals        History of Present Illness  Reilly Muir is a 59 y o  male here for routine prostate cancer screening  Review of Systems    Urinary Incontinence Screening      Most Recent Value   Urinary Incontinence   Urinary Incontinence? No   Incomplete emptying? No   Urinary frequency? Yes   Urinary urgency? Yes   Urinary hesitancy? No   Dysuria (painful difficult urination)? No   Nocturia (waking up to use the bathroom)? Yes [2x]   Straining (having to push to go)? No   Weak stream?  No   Intermittent stream?  No          AUA SYMPTOM SCORE      Most Recent Value   AUA SYMPTOM SCORE   How often have you had a sensation of not emptying your bladder completely after you finished urinating? 0   How often have you had to urinate again less than two hours after you finished urinating? 1   How often have you found you stopped and started again several times when you urinate?  0   How often have you found it difficult to postpone urination? 1   How often have you had a weak urinary stream?  0   How often have you had to push or strain to begin urination? 0   How many times did you most typically get up to urinate from the time you went to bed at night until the time you got up in the morning?   2   Quality of Life: If you were to spend the rest of your life with your urinary condition just the way it is now, how would you feel about that?  0   AUA SYMPTOM SCORE  4 Past Medical History  Past Medical History:   Diagnosis Date    Cancer ([facility] )     Hyperlipidemia     Malignant melanoma of skin ([facility] )     last assessed 10/25/17, resolved 4/20/15       Past Social History  Past Surgical History:   Procedure Laterality Date    COLONOSCOPY  2012    Dr Kait Traore   Impression 1/12/15    HEMORROIDECTOMY      SKIN LESION EXCISION Right     Elbow     TONSILECTOMY AND ADNOIDECTOMY       Social History     Tobacco Use   Smoking Status Never Smoker   Smokeless Tobacco Never Used       Past Family History  Family History   Problem Relation Age of Onset    Diabetes Mother     Skin cancer Mother     Coronary artery disease Father        Past Social history  Social History     Socioeconomic History    Marital status: /Civil Union     Spouse name: Not on file    Number of children: Not on file    Years of education: Not on file    Highest education level: Not on file   Occupational History    Not on file   Social Needs    Financial resource strain: Not on file    Food insecurity:     Worry: Not on file     Inability: Not on file    Transportation needs:     Medical: Not on file     Non-medical: Not on file   Tobacco Use    Smoking status: Never Smoker    Smokeless tobacco: Never Used   Substance and Sexual Activity    Alcohol use: Yes     Comment: Social     Drug use: No    Sexual activity: Not on file   Lifestyle    Physical activity:     Days per week: Not on file     Minutes per session: Not on file    Stress: Not on file   Relationships    Social connections:     Talks on phone: Not on file     Gets together: Not on file     Attends Episcopalian service: Not on file     Active member of club or organization: Not on file     Attends meetings of clubs or organizations: Not on file     Relationship status: Not on file    Intimate partner violence:     Fear of current or ex partner: Not on file     Emotionally abused: Not on file     Physically abused: Not on file Forced sexual activity: Not on file   Other Topics Concern    Not on file   Social History Narrative    Not on file       Current Medications  Current Outpatient Medications   Medication Sig Dispense Refill    amLODIPine (NORVASC) 5 mg tablet TAKE 2 TABLETS IN THE MORNING  180 tablet 1    Ascorbic Acid (VITAMIN C) 1000 MG tablet Take 1 tablet by mouth daily      EPINEPHrine (EPIPEN 2-NICOLE) 0 3 mg/0 3 mL SOAJ Inject 0 3 mL (0 3 mg total) into a muscle once for 1 dose 1 each 1    escitalopram (LEXAPRO) 20 mg tablet TAKE 1 TABLET BY MOUTH EVERY DAY 90 tablet 1    rosuvastatin (CRESTOR) 5 mg tablet TAKE 1 TABLET BY MOUTH EVERY DAY 90 tablet 0    cyclobenzaprine (FLEXERIL) 10 mg tablet Take 1 tablet (10 mg total) by mouth 3 (three) times a day as needed for muscle spasms (Patient not taking: Reported on 7/9/2019) 30 tablet 0    lidocaine (LIDODERM) 5 % Place 1 patch on the skin daily Remove & Discard patch within 12 hours or as directed by MD (Patient not taking: Reported on 7/9/2019) 30 patch 0    methylPREDNISolone 4 MG tablet therapy pack Use as directed on package (Patient not taking: Reported on 7/9/2019) 1 each 0     No current facility-administered medications for this visit  Allergies  Allergies   Allergen Reactions    Bee Venom Edema    Celecoxib Hives     Reaction Date: 21Nov2011; Category: Allergy;           The following portions of the patient's history were reviewed and updated as appropriate: allergies, current medications, past medical history, past social history, past surgical history and problem list       Vitals  Vitals:    07/09/19 1327   BP: 122/98   BP Location: Left arm   Patient Position: Sitting   Cuff Size: Adult   Pulse: 90   Weight: 106 kg (234 lb 3 2 oz)   Height: 6' 4" (1 93 m)     Physical Exam  Physical Exam      Results  No results found for this or any previous visit (from the past 1 hour(s)) ]  Lab Results   Component Value Date    PSA 1 5 06/25/2019    PSA 1 7 05/22/2018    PSA 1 8 03/30/2017     Lab Results   Component Value Date    GLUCOSE 85 11/05/2015    CALCIUM 8 7 01/22/2019     11/05/2015    K 3 7 01/22/2019    CO2 29 01/22/2019     01/22/2019    BUN 23 01/22/2019    CREATININE 0 92 01/22/2019     Lab Results   Component Value Date    WBC 6 68 08/14/2018    HGB 14 9 08/14/2018    HCT 42 3 08/14/2018    MCV 89 08/14/2018     08/14/2018     Orders  Orders Placed This Encounter   Procedures    PSA, Total Screen     This is a patient instruction: This test is non-fasting  Please drink two glasses of water morning of bloodwork          Standing Status:   Future     Standing Expiration Date:   7/9/2020     MILES Martínez

## 2019-08-08 DIAGNOSIS — E78.5 HYPERLIPIDEMIA, UNSPECIFIED HYPERLIPIDEMIA TYPE: ICD-10-CM

## 2019-08-08 RX ORDER — ROSUVASTATIN CALCIUM 5 MG/1
TABLET, COATED ORAL
Qty: 90 TABLET | Refills: 0 | Status: SHIPPED | OUTPATIENT
Start: 2019-08-08 | End: 2019-11-08 | Stop reason: SDUPTHER

## 2019-08-14 DIAGNOSIS — F41.9 ANXIETY: ICD-10-CM

## 2019-08-14 RX ORDER — ESCITALOPRAM OXALATE 20 MG/1
TABLET ORAL
Qty: 90 TABLET | Refills: 1 | Status: SHIPPED | OUTPATIENT
Start: 2019-08-14 | End: 2020-02-10

## 2019-09-03 DIAGNOSIS — I10 ESSENTIAL HYPERTENSION: ICD-10-CM

## 2019-09-03 RX ORDER — AMLODIPINE BESYLATE 5 MG/1
TABLET ORAL
Qty: 180 TABLET | Refills: 1 | Status: SHIPPED | OUTPATIENT
Start: 2019-09-03 | End: 2020-03-02

## 2019-09-12 ENCOUNTER — APPOINTMENT (OUTPATIENT)
Dept: LAB | Facility: CLINIC | Age: 59
End: 2019-09-12
Payer: COMMERCIAL

## 2019-09-12 DIAGNOSIS — E78.5 HYPERLIPIDEMIA, UNSPECIFIED HYPERLIPIDEMIA TYPE: ICD-10-CM

## 2019-09-12 DIAGNOSIS — Z13.1 SCREENING FOR DIABETES MELLITUS: ICD-10-CM

## 2019-09-12 LAB
ALBUMIN SERPL BCP-MCNC: 3.9 G/DL (ref 3.5–5)
ALP SERPL-CCNC: 89 U/L (ref 46–116)
ALT SERPL W P-5'-P-CCNC: 42 U/L (ref 12–78)
ANION GAP SERPL CALCULATED.3IONS-SCNC: 5 MMOL/L (ref 4–13)
AST SERPL W P-5'-P-CCNC: 23 U/L (ref 5–45)
BILIRUB SERPL-MCNC: 1.37 MG/DL (ref 0.2–1)
BUN SERPL-MCNC: 23 MG/DL (ref 5–25)
CALCIUM SERPL-MCNC: 8.8 MG/DL (ref 8.3–10.1)
CHLORIDE SERPL-SCNC: 106 MMOL/L (ref 100–108)
CHOLEST SERPL-MCNC: 164 MG/DL (ref 50–200)
CO2 SERPL-SCNC: 26 MMOL/L (ref 21–32)
CREAT SERPL-MCNC: 0.96 MG/DL (ref 0.6–1.3)
EST. AVERAGE GLUCOSE BLD GHB EST-MCNC: 103 MG/DL
GFR SERPL CREATININE-BSD FRML MDRD: 85 ML/MIN/1.73SQ M
GLUCOSE P FAST SERPL-MCNC: 95 MG/DL (ref 65–99)
HBA1C MFR BLD: 5.2 % (ref 4.2–6.3)
HDLC SERPL-MCNC: 51 MG/DL (ref 40–60)
LDLC SERPL CALC-MCNC: 99 MG/DL (ref 0–100)
POTASSIUM SERPL-SCNC: 3.8 MMOL/L (ref 3.5–5.3)
PROT SERPL-MCNC: 7.4 G/DL (ref 6.4–8.2)
SODIUM SERPL-SCNC: 137 MMOL/L (ref 136–145)
TRIGL SERPL-MCNC: 71 MG/DL

## 2019-09-12 PROCEDURE — 80053 COMPREHEN METABOLIC PANEL: CPT

## 2019-09-12 PROCEDURE — 83036 HEMOGLOBIN GLYCOSYLATED A1C: CPT

## 2019-09-12 PROCEDURE — 80061 LIPID PANEL: CPT

## 2019-09-12 PROCEDURE — 36415 COLL VENOUS BLD VENIPUNCTURE: CPT

## 2019-09-30 ENCOUNTER — OFFICE VISIT (OUTPATIENT)
Dept: INTERNAL MEDICINE CLINIC | Facility: CLINIC | Age: 59
End: 2019-09-30
Payer: COMMERCIAL

## 2019-09-30 VITALS
HEIGHT: 76 IN | HEART RATE: 82 BPM | RESPIRATION RATE: 16 BRPM | OXYGEN SATURATION: 98 % | BODY MASS INDEX: 28.93 KG/M2 | WEIGHT: 237.6 LBS | SYSTOLIC BLOOD PRESSURE: 124 MMHG | DIASTOLIC BLOOD PRESSURE: 84 MMHG

## 2019-09-30 DIAGNOSIS — Z13.1 SCREENING FOR DIABETES MELLITUS: ICD-10-CM

## 2019-09-30 DIAGNOSIS — M75.100 NONTRAUMATIC TEAR OF ROTATOR CUFF, UNSPECIFIED LATERALITY, UNSPECIFIED TEAR EXTENT: ICD-10-CM

## 2019-09-30 DIAGNOSIS — E78.5 HYPERLIPIDEMIA, UNSPECIFIED HYPERLIPIDEMIA TYPE: ICD-10-CM

## 2019-09-30 DIAGNOSIS — Z91.030 ALLERGY TO BEE STING: ICD-10-CM

## 2019-09-30 DIAGNOSIS — F41.9 ANXIETY: ICD-10-CM

## 2019-09-30 DIAGNOSIS — I10 ESSENTIAL HYPERTENSION: Primary | ICD-10-CM

## 2019-09-30 DIAGNOSIS — Z23 NEED FOR INFLUENZA VACCINATION: ICD-10-CM

## 2019-09-30 DIAGNOSIS — Z12.11 SCREENING FOR MALIGNANT NEOPLASM OF COLON: ICD-10-CM

## 2019-09-30 PROCEDURE — 99214 OFFICE O/P EST MOD 30 MIN: CPT | Performed by: INTERNAL MEDICINE

## 2019-09-30 PROCEDURE — 3079F DIAST BP 80-89 MM HG: CPT | Performed by: INTERNAL MEDICINE

## 2019-09-30 PROCEDURE — 90471 IMMUNIZATION ADMIN: CPT

## 2019-09-30 PROCEDURE — 90682 RIV4 VACC RECOMBINANT DNA IM: CPT

## 2019-09-30 PROCEDURE — 3008F BODY MASS INDEX DOCD: CPT | Performed by: INTERNAL MEDICINE

## 2019-09-30 PROCEDURE — 3074F SYST BP LT 130 MM HG: CPT | Performed by: INTERNAL MEDICINE

## 2019-09-30 RX ORDER — EPINEPHRINE 0.3 MG/.3ML
0.3 INJECTION SUBCUTANEOUS ONCE
Qty: 1 EACH | Refills: 1 | Status: SHIPPED | OUTPATIENT
Start: 2019-09-30 | End: 2020-06-09 | Stop reason: SDUPTHER

## 2019-09-30 NOTE — ASSESSMENT & PLAN NOTE
Patient did request refill of EpiPen which will be sent to the [address] [place] [address] he does have be allergy he will use medication p r n   Anaphylaxis

## 2019-09-30 NOTE — ASSESSMENT & PLAN NOTE
Clinically stable and doing well continue the current medical regiment will continue monitor  Continue Lexapro 20 mg once daily will continue monitor

## 2019-10-09 ENCOUNTER — EVALUATION (OUTPATIENT)
Dept: PHYSICAL THERAPY | Facility: CLINIC | Age: 59
End: 2019-10-09
Payer: COMMERCIAL

## 2019-10-09 DIAGNOSIS — M75.100 NONTRAUMATIC TEAR OF ROTATOR CUFF, UNSPECIFIED LATERALITY, UNSPECIFIED TEAR EXTENT: Primary | ICD-10-CM

## 2019-10-09 PROCEDURE — 97161 PT EVAL LOW COMPLEX 20 MIN: CPT | Performed by: PHYSICAL THERAPIST

## 2019-10-09 PROCEDURE — 97110 THERAPEUTIC EXERCISES: CPT | Performed by: PHYSICAL THERAPIST

## 2019-10-09 NOTE — PROGRESS NOTES
PT Evaluation     Today's date: 10/9/2019  Patient name: Lisa Jett  : 3/77/1982  MRN: [id number]  Referring provider: Janny Veliz DO  Dx:   Encounter Diagnosis     ICD-10-CM    1  Nontraumatic tear of rotator cuff, unspecified laterality, unspecified tear extent M75 100 Ambulatory referral to Physical Therapy                  Assessment  Assessment details: Lisa Jett is a 62 y o  Male who presents with R shoulder pain  Pt has decreased R UE strength and ROM as well as sharp shooting pains with certain movements  Pt currently has difficulty reaching behind back, difficulty lifting things, difficulty taking coat off  Pt would benefit from skilled PT to address the above impairments and to return to pain free function  Impairments: abnormal or restricted ROM, lacks appropriate home exercise program and pain with function  Functional limitations: difficulty reaching behind back, difficulty lifting things, difficulty taking coat off  Symptom irritability: moderateUnderstanding of Dx/Px/POC: good   Prognosis: good    Goals  1  Pt will be independent with HEP upon DC  2  Pt's R UE ROM will increase by at least 25% to allow for dressing without difficulty  3  Pt's R UE strength will be 5/5 t/o and pain free to allow for lifting dog with ease  4  Pt's pain will be no more than 3/10 to allow for return to PLOF  Plan  Patient would benefit from: skilled physical therapy  Planned therapy interventions: joint mobilization, manual therapy, neuromuscular re-education, therapeutic activities and therapeutic exercise  Frequency: 2x week  Duration in visits: 12  Duration in weeks: 6  Treatment plan discussed with: patient        Subjective Evaluation    History of Present Illness  Onset date: chronic  Mechanism of injury: Pt reports an insidious onset of R shoulder pain beginning a few months ago  Pt denies imaging or injection  Pt reports to OP PT            Not a recurrent problem   Quality of life: good    Pain  Current pain ratin  At best pain ratin  At worst pain ratin  Location: R shoulder  Quality: sharp  Relieving factors: rest  Progression: no change    Hand dominance: right      Diagnostic Tests  No diagnostic tests performed  Treatments  No previous or current treatments  Patient Goals  Patient goals for therapy: decreased pain          Objective     Palpation     Right   Tenderness of the supraspinatus  Active Range of Motion     Right Shoulder   Flexion: 155 degrees   Abduction: 135 degrees   External rotation 90°: WFL  Internal rotation 90°: 25 degrees     Passive Range of Motion     Right Shoulder   Flexion: 165 degrees   Abduction: 135 degrees   External rotation 90°: WFL  Internal rotation 90°: 25 degrees     Strength/Myotome Testing     Right Shoulder     Planes of Motion   Flexion: 4-   Abduction: 5   External rotation at 90°: 5   Internal rotation at 90°: 5     Tests     Right Shoulder   Positive empty can and Hawkin's                Precautions: none      Manual              PROM to loretta                                                                     Exercise Diary  10/9            UBE             Wall slides (flex/ab) 10x10"            IR stretch with strap 3x30"            Pulleys (flex/ab)             Cane AAROM (IR/ext)             Posture tband             Supine cane AAROM (flex/ab)             Supine serratus punch             Supine flexion                                                                                                                                                                Modalities

## 2019-10-16 ENCOUNTER — OFFICE VISIT (OUTPATIENT)
Dept: PHYSICAL THERAPY | Facility: CLINIC | Age: 59
End: 2019-10-16
Payer: COMMERCIAL

## 2019-10-16 DIAGNOSIS — M75.100 NONTRAUMATIC TEAR OF ROTATOR CUFF, UNSPECIFIED LATERALITY, UNSPECIFIED TEAR EXTENT: Primary | ICD-10-CM

## 2019-10-16 PROCEDURE — 97140 MANUAL THERAPY 1/> REGIONS: CPT

## 2019-10-16 PROCEDURE — 97110 THERAPEUTIC EXERCISES: CPT

## 2019-10-16 NOTE — PROGRESS NOTES
Daily Note     Today's date: 10/16/2019  Patient name: Fern Parks  :   MRN: [id number]  Referring provider: Darryll Sicard, DO  Dx:   Encounter Diagnosis     ICD-10-CM    1  Nontraumatic tear of rotator cuff, unspecified laterality, unspecified tear extent M75 100                   Subjective: Pt reports R shoulder pain when reaching back and end range shoulder flexion  Reports no R shoulder pain upon arrival to therapy  Objective: See treatment diary below      Assessment: Reviewed/performed HEP  Able to loretta new exercises, required vc'ing throughout program to keep ex pain free  Added PROM to R shoulder discomfort/mm guarding end ranges  Will monitor  Patient would benefit from continued PT      Plan: Continue per plan of care        Precautions: none      Manual   10/16           PROM to loretta  10'                                                                   Exercise Diary  10/9 10/16           UBE  3'/3'           Wall slides (flex/ab) 10x10" 10x  10"           IR stretch with strap 3x30" 3x30"           Pulleys (flex/ab)  10x  10"ea           Cane AAROM (IR/ext)  10x  10"           Posture tband             Supine cane AAROM (flex/ab)             Supine serratus punch             Supine flexion                                                                                                                                                                Modalities

## 2019-10-24 ENCOUNTER — OFFICE VISIT (OUTPATIENT)
Dept: PHYSICAL THERAPY | Facility: CLINIC | Age: 59
End: 2019-10-24
Payer: COMMERCIAL

## 2019-10-24 DIAGNOSIS — M75.100 NONTRAUMATIC TEAR OF ROTATOR CUFF, UNSPECIFIED LATERALITY, UNSPECIFIED TEAR EXTENT: Primary | ICD-10-CM

## 2019-10-24 PROCEDURE — 97110 THERAPEUTIC EXERCISES: CPT | Performed by: PHYSICAL THERAPIST

## 2019-10-24 PROCEDURE — 97140 MANUAL THERAPY 1/> REGIONS: CPT | Performed by: PHYSICAL THERAPIST

## 2019-10-24 NOTE — PROGRESS NOTES
Daily Note     Today's date: 10/24/2019  Patient name: Elmer Regan  :   MRN: [id number]  Referring provider: Dave Mendieta DO  Dx:   Encounter Diagnosis     ICD-10-CM    1  Nontraumatic tear of rotator cuff, unspecified laterality, unspecified tear extent M75 100                   Subjective: "I was lifting boxes yesterday at the food bank and I felt really good "      Objective: See treatment diary below      Assessment: Pt had good tolerance to progression of program  Notable increase in ROM  Plan: Continue per plan of care        Precautions: none      Manual   10/16 10/24          PROM to loretta  10' 10'                                                                  Exercise Diary  10/9 10/16 10/24          UBE  3'/3' 3'/3'          Wall slides (flex/ab) 10x10" 10x  10" HEP          IR stretch with strap 3x30" 3x30"           Pulleys (flex/ab)  10x  10"ea 10x10" ea          Cane AAROM (IR/ext)  10x  10" 10x10" ea          Posture tband   GTB x15ea          Supine cane AAROM (flex/ab)   10x10" ea          Supine serratus punch   x15          Supine flexion   x15          H ab with tband             Diagonals with tband             Ball on wall (CW/CCW)             3 way arm lift             Scap wall slides                                                                                               Modalities

## 2019-10-31 ENCOUNTER — OFFICE VISIT (OUTPATIENT)
Dept: PHYSICAL THERAPY | Facility: CLINIC | Age: 59
End: 2019-10-31
Payer: COMMERCIAL

## 2019-10-31 DIAGNOSIS — M75.100 NONTRAUMATIC TEAR OF ROTATOR CUFF, UNSPECIFIED LATERALITY, UNSPECIFIED TEAR EXTENT: Primary | ICD-10-CM

## 2019-10-31 PROCEDURE — 97110 THERAPEUTIC EXERCISES: CPT

## 2019-10-31 PROCEDURE — 97140 MANUAL THERAPY 1/> REGIONS: CPT

## 2019-10-31 NOTE — PROGRESS NOTES
Daily Note     Today's date: 10/31/2019  Patient name: Louann Coughlin  :   MRN: [id number]  Referring provider: Misael Dowell DO  Dx:   Encounter Diagnosis     ICD-10-CM    1  Nontraumatic tear of rotator cuff, unspecified laterality, unspecified tear extent M75 100        Start Time: 1440  Stop Time: 1520  Total time in clinic (min): 40 minutes    Subjective: Patient noted improvements  He has occasional pain when lifting heavier objects  Objective: See treatment diary below    Assessment: Patient had a random and occasional sharp pain during supine AROM flexion  Patients ROM is progressing well  He quickly fatigued to ball on wall  Plan: Continue per plan of care        Precautions: none    Manual   10/16 10/24 10/31         PROM to loretta  10' 10' 10'                                                               Exercise Diary  10/9 10/16 10/24 10/31         UBE  3'/3' 3'/3' L7 3'/3'         Wall slides (flex/ab) 10x10" 10x  10" HEP          IR stretch with strap 3x30" 3x30"           Pulleys (flex/ab)  10x  10"ea 10x10" ea 10"  x10 ea         Cane AAROM (IR/ext)  10x  10" 10x10" ea 10  x10" ea         Posture tband   GTB x15ea GTB  3x10 ea         Supine cane AAROM (flex/ab)   10x10" ea 10x  10" ea         Supine serratus punch   x15 2x10         Supine flexion   x15 x15         H ab with tband             Diagonals with tband             Ball on wall (CW/CCW)    x20 ea         3 way arm lift             Scap wall slides                                                                                             Modalities

## 2019-11-07 ENCOUNTER — OFFICE VISIT (OUTPATIENT)
Dept: PHYSICAL THERAPY | Facility: CLINIC | Age: 59
End: 2019-11-07
Payer: COMMERCIAL

## 2019-11-07 DIAGNOSIS — M75.100 NONTRAUMATIC TEAR OF ROTATOR CUFF, UNSPECIFIED LATERALITY, UNSPECIFIED TEAR EXTENT: Primary | ICD-10-CM

## 2019-11-07 PROCEDURE — 97110 THERAPEUTIC EXERCISES: CPT

## 2019-11-07 PROCEDURE — 97140 MANUAL THERAPY 1/> REGIONS: CPT

## 2019-11-07 NOTE — PROGRESS NOTES
Daily Note     Today's date: 2019  Patient name: Eve Woo  :   MRN: [id number]  Referring provider: Oz Mosley DO  Dx:   Encounter Diagnosis     ICD-10-CM    1  Nontraumatic tear of rotator cuff, unspecified laterality, unspecified tear extent M75 100        Start Time: 1045  Stop Time: 1130  Total time in clinic (min): 45 minutes    Subjective: Patients notes cleaning windows in his house which took about 40 minutes  He had no complaints of pain while cleaning  He notes improvements since starting PT  Objective: See treatment diary below    Assessment: Patient continues to have pain limited ROM at end range of motion  Mod muscular guarding  Patient able to complete a few new exercises this visit with no increase in pain  Verbal cueing for proper form and intensity  He tends to move quickly during exercise  Plan: Continue per plan of care        Precautions: none    Manual   10/16 10/24 10/31 11/7        PROM to loretta  10' 10' 10' 10'                                                              Exercise Diary  10/9 10/16 10/24 10/31 11/7        UBE  3'/3' 3'/3' L7 3'/3' L7 3'/3'        Wall slides (flex/ab) 10x10" 10x  10" HEP          IR stretch with strap 3x30" 3x30"           Pulleys (flex/ab)  10x  10"ea 10x10" ea 10"  x10 ea 10"  x10 ea        Cane AAROM (IR/ext)  10x  10" 10x10" ea 10  x10" ea 10"  x10 ea        Posture tband   GTB x15ea GTB  3x10 ea GTB  3x10 ea        Supine cane AAROM (flex/ab)   10x10" ea 10x  10" ea 10"  x10 ea        Supine serratus punch   x15 2x10 2x10        Supine flexion   x15 x15 x15        H ab with tband     RTB  2x10        Diagonals with tband             Ball on wall (CW/CCW)    x20 ea x20 ea        3 way arm lift     x10  Flex/abd        Scap wall slides                                                                                             Modalities

## 2019-11-08 DIAGNOSIS — E78.5 HYPERLIPIDEMIA, UNSPECIFIED HYPERLIPIDEMIA TYPE: ICD-10-CM

## 2019-11-08 RX ORDER — ROSUVASTATIN CALCIUM 5 MG/1
TABLET, COATED ORAL
Qty: 90 TABLET | Refills: 0 | Status: SHIPPED | OUTPATIENT
Start: 2019-11-08 | End: 2020-01-31

## 2019-11-14 ENCOUNTER — OFFICE VISIT (OUTPATIENT)
Dept: PHYSICAL THERAPY | Facility: CLINIC | Age: 59
End: 2019-11-14
Payer: COMMERCIAL

## 2019-11-14 DIAGNOSIS — M75.100 NONTRAUMATIC TEAR OF ROTATOR CUFF, UNSPECIFIED LATERALITY, UNSPECIFIED TEAR EXTENT: Primary | ICD-10-CM

## 2019-11-14 PROCEDURE — 97110 THERAPEUTIC EXERCISES: CPT

## 2019-11-14 PROCEDURE — 97140 MANUAL THERAPY 1/> REGIONS: CPT

## 2019-11-14 NOTE — PROGRESS NOTES
Daily Note     Today's date: 2019  Patient name: Tasia Smlal  :   MRN: [id number]  Referring provider: Martin Ga DO  Dx:   Encounter Diagnosis     ICD-10-CM    1  Nontraumatic tear of rotator cuff, unspecified laterality, unspecified tear extent M75 100                   Subjective: "It's a lot better " Notes yesterday, he reached to give someone something in the backseat of his car w/o discomfort  Objective: See treatment diary below    Assessment: Pt performed new exercises w/o complaint  He cont to have discomfort at times during exercise program, required vc'ing throughout same for form and to perform exercises slowly  Able to loretta PROM to R shoulder, mm guarding at times during same  Issued GTB and updated written HEP instructions, reviewed same w/pt  Plan: Continue per plan of care        Precautions: none    Manual   10/16 10/24 10/31 11/7 11/14       PROM to loretta  10' 10' 10' 10' 10'                                                             Exercise Diary  10/9 10/16 10/24 10/31 11/7 11/14       UBE  3'/3' 3'/3' L7 3'/3' L7 3'/3' 3'/3'       Wall slides (flex/ab) 10x10" 10x  10" HEP          IR stretch with strap 3x30" 3x30"           Pulleys (flex/ab)  10x  10"ea 10x10" ea 10"  x10 ea 10"  x10 ea 10"x10  ea       Cane AAROM (IR/ext)  10x  10" 10x10" ea 10  x10" ea 10"  x10 ea 10"x10  ea       Posture tband   GTB x15ea GTB  3x10 ea GTB  3x10 ea GTB  3x10  ea       Supine cane AAROM (flex/ab)   10x10" ea 10x  10" ea 10"  x10 ea 10"x10  ea       Supine serratus punch   x15 2x10 2x10 2x10       Supine flexion   x15 x15 x15        H ab with tband     RTB  2x10 RTB  2x10       Diagonals with tband      NV       Ball on wall (CW/CCW)    x20 ea x20 ea x20ea       3 way arm lift     x10  Flex/abd x10ea       Scap wall slides      x10                                                                                       Modalities

## 2019-11-21 ENCOUNTER — OFFICE VISIT (OUTPATIENT)
Dept: PHYSICAL THERAPY | Facility: CLINIC | Age: 59
End: 2019-11-21
Payer: COMMERCIAL

## 2019-11-21 DIAGNOSIS — M75.100 NONTRAUMATIC TEAR OF ROTATOR CUFF, UNSPECIFIED LATERALITY, UNSPECIFIED TEAR EXTENT: Primary | ICD-10-CM

## 2019-11-21 PROCEDURE — 97140 MANUAL THERAPY 1/> REGIONS: CPT

## 2019-11-21 PROCEDURE — 97110 THERAPEUTIC EXERCISES: CPT

## 2019-11-21 NOTE — PROGRESS NOTES
Daily Note     Today's date: 2019  Patient name: Vangie Juarez  : [birth year]  MRN: [id number]  Referring provider: Lisa Barrientos DO  Dx:   Encounter Diagnosis     ICD-10-CM    1  Nontraumatic tear of rotator cuff, unspecified laterality, unspecified tear extent M75 100                   Subjective: Pt reports that he is doing well with no pain noted  He also reported that he thinks next week should be his last week of PT  Objective: See treatment diary below    Assessment: Pt performed all exercises this session with good tolerance, being mostly challenged by PNF flexion with increased discomfort  An empty end feel was present with shoulder flexion today  Plan: Continue per plan of care        Precautions: none    Manual   10/16 10/24 10/31 11/7 11/14 11/21      R PROM to loretta  10' 10' 10' 10' 10' 10'                                                            Exercise Diary  10/9 10/16 10/24 10/31 11/7 11/14 11/21      UBE  3'/3' 3'/3' L7 3'/3' L7 3'/3' 3'/3' 3'/3'      Wall slides (flex/ab) 10x10" 10x  10" HEP          IR stretch with strap 3x30" 3x30"           Pulleys (flex/ab)  10x  10"ea 10x10" ea 10"  x10 ea 10"  x10 ea 10"x10  ea 10"x 10 ea      Cane AAROM (IR/ext)  10x  10" 10x10" ea 10  x10" ea 10"  x10 ea 10"x10  ea 10" x10ea      Posture tband   GTB x15ea GTB  3x10 ea GTB  3x10 ea GTB  3x10  ea GTB 3x10 ea      Supine cane AAROM (flex/ab)   10x10" ea 10x  10" ea 10"  x10 ea 10"x10  ea 10"x 10 ea      Supine serratus punch   x15 2x10 2x10 2x10 2x10      Supine flexion   x15 x15 x15        H ab with tband     RTB  2x10 RTB  2x10 RTB 2x10      Diagonals with tband      NV RTB 20x ea      Ball on wall (CW/CCW)    x20 ea x20 ea x20ea x20ea      3 way arm lift     x10  Flex/abd x10ea 2# 10x ea      Scap wall slides      x10 2x10                                                                                      Modalities

## 2019-12-03 ENCOUNTER — EVALUATION (OUTPATIENT)
Dept: PHYSICAL THERAPY | Facility: CLINIC | Age: 59
End: 2019-12-03
Payer: COMMERCIAL

## 2019-12-03 DIAGNOSIS — M75.100 NONTRAUMATIC TEAR OF ROTATOR CUFF, UNSPECIFIED LATERALITY, UNSPECIFIED TEAR EXTENT: Primary | ICD-10-CM

## 2019-12-03 PROCEDURE — 97110 THERAPEUTIC EXERCISES: CPT | Performed by: PHYSICAL THERAPIST

## 2019-12-03 NOTE — PROGRESS NOTES
Daily Note/Discharge summary    Today's date: 12/3/2019  Patient name: Louis Saunders  : [birth year]  MRN: [id number]  Referring provider: Jadyn Ryan DO  Dx:   Encounter Diagnosis     ICD-10-CM    1  Nontraumatic tear of rotator cuff, unspecified laterality, unspecified tear extent M75 100                       Objective: See treatment diary below             Assessment  All R UE strength and ROM is now WNL  Pt no longer has difficulty with reaching, dressing, or lifting things  No further skilled PT required at this time  Goals  1  Pt will be independent with HEP upon DC - met  2  Pt's R UE ROM will increase by at least 25% to allow for dressing without difficulty  - met  3  Pt's R UE strength will be 5/5 t/o and pain free to allow for lifting dog with ease  - met  4  Pt's pain will be no more than 3/10 to allow for return to PLOF  - met    Plan  No further skilled PT required at this time          Subjective Evaluation      Pain  Current pain ratin  At best pain ratin  At worst pain ratin  Location: R shoulder        Patient Goals  Patient goals for therapy: decreased pain          Objective       Active Range of Motion     Right Shoulder   Flexion: 175 degrees   Abduction: 175 degrees   External rotation 90°: [facility]  Internal rotation 90°: 60 degrees     Passive Range of Motion     Right Shoulder   Flexion: 180 degrees   Abduction: 175 degrees   External rotation 90°: [facility]  Internal rotation 90°: 65 degrees     Strength/Myotome Testing     Right Shoulder     Planes of Motion   Flexion: 5  Abduction: 5   External rotation at 90°: 5   Internal rotation at 90°: 5        Precautions: none    Manual   10/16 10/24 10/31 11/7 11/14 11/21      R PROM to loretta  10' 10' 10' 10' 10' 10'        Exercise Diary  10/9 10/16 10/24 10/31 11/7 11/14 11/21 12/3     UBE  3'/3' 3'/3' L7 3'/3' L7 3'/3' 3'/3' 3'/3' 3'/3'     Pulleys (flex/ab)  10x  10"ea 10x10" ea 10"  x10 ea 10"  x10 ea 10"x10  ea 10"x 10 ea 10x10" ea     Cane AAROM (IR/ext)  10x  10" 10x10" ea 10  x10" ea 10"  x10 ea 10"x10  ea 10" x10ea 10"x10 ea     Posture tband   GTB x15ea GTB  3x10 ea GTB  3x10 ea GTB  3x10  ea GTB 3x10 ea BTB x20ea     Supine cane AAROM (flex/ab)   10x10" ea 10x  10" ea 10"  x10 ea 10"x10  ea 10"x 10 ea 10"x10 ea     Supine serratus punch   x15 2x10 2x10 2x10 2x10 x20     Supine flexion   x15 x15 x15   x20     H ab with tband     RTB  2x10 RTB  2x10 RTB 2x10 GTB x20     Diagonals with tband      NV RTB 20x ea GTB x20ea     Ball on wall (CW/CCW)    x20 ea x20 ea x20ea x20ea      3 way arm lift     x10  Flex/abd x10ea 2# 10x ea 2# x20ea     Scap wall slides      x10 2x10 x20

## 2020-01-31 DIAGNOSIS — E78.5 HYPERLIPIDEMIA, UNSPECIFIED HYPERLIPIDEMIA TYPE: ICD-10-CM

## 2020-01-31 RX ORDER — ROSUVASTATIN CALCIUM 5 MG/1
TABLET, COATED ORAL
Qty: 90 TABLET | Refills: 0 | Status: SHIPPED | OUTPATIENT
Start: 2020-01-31 | End: 2020-04-23

## 2020-02-09 DIAGNOSIS — F41.9 ANXIETY: ICD-10-CM

## 2020-02-10 RX ORDER — ESCITALOPRAM OXALATE 20 MG/1
TABLET ORAL
Qty: 90 TABLET | Refills: 1 | Status: SHIPPED | OUTPATIENT
Start: 2020-02-10 | End: 2020-08-10

## 2020-03-01 DIAGNOSIS — I10 ESSENTIAL HYPERTENSION: ICD-10-CM

## 2020-03-02 RX ORDER — AMLODIPINE BESYLATE 5 MG/1
TABLET ORAL
Qty: 180 TABLET | Refills: 1 | Status: SHIPPED | OUTPATIENT
Start: 2020-03-02 | End: 2020-08-28

## 2020-04-23 DIAGNOSIS — E78.5 HYPERLIPIDEMIA, UNSPECIFIED HYPERLIPIDEMIA TYPE: ICD-10-CM

## 2020-04-23 RX ORDER — ROSUVASTATIN CALCIUM 5 MG/1
TABLET, COATED ORAL
Qty: 90 TABLET | Refills: 0 | Status: SHIPPED | OUTPATIENT
Start: 2020-04-23 | End: 2020-07-16

## 2020-04-27 ENCOUNTER — TELEMEDICINE (OUTPATIENT)
Dept: GASTROENTEROLOGY | Facility: CLINIC | Age: 60
End: 2020-04-27
Payer: COMMERCIAL

## 2020-04-27 VITALS — HEIGHT: 76 IN | WEIGHT: 228 LBS | BODY MASS INDEX: 27.76 KG/M2

## 2020-04-27 DIAGNOSIS — Z11.59 NEED FOR HEPATITIS C SCREENING TEST: ICD-10-CM

## 2020-04-27 DIAGNOSIS — E78.5 HYPERLIPIDEMIA, UNSPECIFIED HYPERLIPIDEMIA TYPE: ICD-10-CM

## 2020-04-27 DIAGNOSIS — Z12.11 COLON CANCER SCREENING: Primary | ICD-10-CM

## 2020-04-27 DIAGNOSIS — K57.90 DIVERTICULOSIS: ICD-10-CM

## 2020-04-27 DIAGNOSIS — K64.9 HEMORRHOIDS, UNSPECIFIED HEMORRHOID TYPE: ICD-10-CM

## 2020-04-27 PROCEDURE — 99214 OFFICE O/P EST MOD 30 MIN: CPT | Performed by: INTERNAL MEDICINE

## 2020-06-08 ENCOUNTER — APPOINTMENT (OUTPATIENT)
Dept: LAB | Facility: HOSPITAL | Age: 60
End: 2020-06-08
Payer: COMMERCIAL

## 2020-06-08 DIAGNOSIS — E78.5 HYPERLIPIDEMIA, UNSPECIFIED HYPERLIPIDEMIA TYPE: ICD-10-CM

## 2020-06-08 DIAGNOSIS — Z13.1 SCREENING FOR DIABETES MELLITUS: ICD-10-CM

## 2020-06-08 LAB
ALBUMIN SERPL BCP-MCNC: 3.8 G/DL (ref 3.5–5)
ALP SERPL-CCNC: 74 U/L (ref 46–116)
ALT SERPL W P-5'-P-CCNC: 34 U/L (ref 12–78)
ANION GAP SERPL CALCULATED.3IONS-SCNC: 3 MMOL/L (ref 4–13)
AST SERPL W P-5'-P-CCNC: 18 U/L (ref 5–45)
BILIRUB SERPL-MCNC: 1.71 MG/DL (ref 0.2–1)
BUN SERPL-MCNC: 24 MG/DL (ref 5–25)
CALCIUM SERPL-MCNC: 9 MG/DL (ref 8.3–10.1)
CHLORIDE SERPL-SCNC: 107 MMOL/L (ref 100–108)
CHOLEST SERPL-MCNC: 169 MG/DL (ref 50–200)
CO2 SERPL-SCNC: 28 MMOL/L (ref 21–32)
CREAT SERPL-MCNC: 0.97 MG/DL (ref 0.6–1.3)
EST. AVERAGE GLUCOSE BLD GHB EST-MCNC: 105 MG/DL
GFR SERPL CREATININE-BSD FRML MDRD: 84 ML/MIN/1.73SQ M
GLUCOSE P FAST SERPL-MCNC: 100 MG/DL (ref 65–99)
HBA1C MFR BLD: 5.3 %
HDLC SERPL-MCNC: 52 MG/DL
LDLC SERPL CALC-MCNC: 106 MG/DL (ref 0–100)
POTASSIUM SERPL-SCNC: 3.9 MMOL/L (ref 3.5–5.3)
PROT SERPL-MCNC: 7 G/DL (ref 6.4–8.2)
SODIUM SERPL-SCNC: 138 MMOL/L (ref 136–145)
TRIGL SERPL-MCNC: 55 MG/DL

## 2020-06-08 PROCEDURE — 36415 COLL VENOUS BLD VENIPUNCTURE: CPT

## 2020-06-08 PROCEDURE — 80053 COMPREHEN METABOLIC PANEL: CPT

## 2020-06-08 PROCEDURE — 83036 HEMOGLOBIN GLYCOSYLATED A1C: CPT

## 2020-06-08 PROCEDURE — 80061 LIPID PANEL: CPT

## 2020-06-09 DIAGNOSIS — Z91.030 ALLERGY TO BEE STING: ICD-10-CM

## 2020-06-09 RX ORDER — EPINEPHRINE 0.3 MG/.3ML
0.3 INJECTION SUBCUTANEOUS ONCE
Qty: 1 EACH | Refills: 1 | Status: SHIPPED | OUTPATIENT
Start: 2020-06-09 | End: 2022-04-15

## 2020-06-10 ENCOUNTER — TRANSCRIBE ORDERS (OUTPATIENT)
Dept: GASTROENTEROLOGY | Facility: CLINIC | Age: 60
End: 2020-06-10

## 2020-06-11 ENCOUNTER — TELEMEDICINE (OUTPATIENT)
Dept: INTERNAL MEDICINE CLINIC | Facility: CLINIC | Age: 60
End: 2020-06-11
Payer: COMMERCIAL

## 2020-06-11 DIAGNOSIS — R73.01 ELEVATED FASTING BLOOD SUGAR: ICD-10-CM

## 2020-06-11 DIAGNOSIS — R89.9 ABNORMAL LABORATORY TEST: ICD-10-CM

## 2020-06-11 DIAGNOSIS — I10 ESSENTIAL HYPERTENSION: ICD-10-CM

## 2020-06-11 DIAGNOSIS — Z20.822 EXPOSURE TO COVID-19 VIRUS: ICD-10-CM

## 2020-06-11 DIAGNOSIS — E78.5 HYPERLIPIDEMIA, UNSPECIFIED HYPERLIPIDEMIA TYPE: Primary | ICD-10-CM

## 2020-06-11 DIAGNOSIS — F41.9 ANXIETY: ICD-10-CM

## 2020-06-11 DIAGNOSIS — Z12.5 SCREENING PSA (PROSTATE SPECIFIC ANTIGEN): ICD-10-CM

## 2020-06-11 DIAGNOSIS — I10 ESSENTIAL (PRIMARY) HYPERTENSION: ICD-10-CM

## 2020-06-11 PROCEDURE — 99214 OFFICE O/P EST MOD 30 MIN: CPT | Performed by: INTERNAL MEDICINE

## 2020-06-22 ENCOUNTER — PREP FOR PROCEDURE (OUTPATIENT)
Dept: GASTROENTEROLOGY | Facility: CLINIC | Age: 60
End: 2020-06-22

## 2020-06-22 DIAGNOSIS — Z20.822 ENCOUNTER FOR LABORATORY TESTING FOR COVID-19 VIRUS: Primary | ICD-10-CM

## 2020-06-23 ENCOUNTER — APPOINTMENT (OUTPATIENT)
Dept: LAB | Facility: HOSPITAL | Age: 60
End: 2020-06-23
Payer: COMMERCIAL

## 2020-06-23 DIAGNOSIS — Z12.5 SCREENING PSA (PROSTATE SPECIFIC ANTIGEN): ICD-10-CM

## 2020-06-23 DIAGNOSIS — E78.5 HYPERLIPIDEMIA, UNSPECIFIED HYPERLIPIDEMIA TYPE: ICD-10-CM

## 2020-06-23 DIAGNOSIS — R73.01 ELEVATED FASTING BLOOD SUGAR: ICD-10-CM

## 2020-06-23 DIAGNOSIS — I10 ESSENTIAL HYPERTENSION: ICD-10-CM

## 2020-06-23 DIAGNOSIS — R89.9 ABNORMAL LABORATORY TEST: ICD-10-CM

## 2020-06-23 DIAGNOSIS — Z20.822 EXPOSURE TO COVID-19 VIRUS: ICD-10-CM

## 2020-06-23 LAB
ALBUMIN SERPL BCP-MCNC: 4 G/DL (ref 3.5–5)
ALP SERPL-CCNC: 70 U/L (ref 46–116)
ALT SERPL W P-5'-P-CCNC: 34 U/L (ref 12–78)
ANION GAP SERPL CALCULATED.3IONS-SCNC: 8 MMOL/L (ref 4–13)
AST SERPL W P-5'-P-CCNC: 19 U/L (ref 5–45)
BILIRUB SERPL-MCNC: 1.68 MG/DL (ref 0.2–1)
BUN SERPL-MCNC: 27 MG/DL (ref 5–25)
CALCIUM SERPL-MCNC: 9.3 MG/DL (ref 8.3–10.1)
CHLORIDE SERPL-SCNC: 107 MMOL/L (ref 100–108)
CO2 SERPL-SCNC: 24 MMOL/L (ref 21–32)
CREAT SERPL-MCNC: 0.9 MG/DL (ref 0.6–1.3)
EST. AVERAGE GLUCOSE BLD GHB EST-MCNC: 103 MG/DL
GFR SERPL CREATININE-BSD FRML MDRD: 92 ML/MIN/1.73SQ M
GLUCOSE P FAST SERPL-MCNC: 71 MG/DL (ref 65–99)
HBA1C MFR BLD: 5.2 %
POTASSIUM SERPL-SCNC: 3.6 MMOL/L (ref 3.5–5.3)
PROT SERPL-MCNC: 7.4 G/DL (ref 6.4–8.2)
SODIUM SERPL-SCNC: 139 MMOL/L (ref 136–145)

## 2020-06-23 PROCEDURE — 83036 HEMOGLOBIN GLYCOSYLATED A1C: CPT

## 2020-06-23 PROCEDURE — 86769 SARS-COV-2 COVID-19 ANTIBODY: CPT

## 2020-06-23 PROCEDURE — 36415 COLL VENOUS BLD VENIPUNCTURE: CPT

## 2020-06-23 PROCEDURE — 80053 COMPREHEN METABOLIC PANEL: CPT

## 2020-06-24 LAB — SARS-COV-2 IGG SERPL QL IA: NEGATIVE

## 2020-07-02 ENCOUNTER — ANESTHESIA EVENT (OUTPATIENT)
Dept: GASTROENTEROLOGY | Facility: AMBULARY SURGERY CENTER | Age: 60
End: 2020-07-02

## 2020-07-09 ENCOUNTER — TELEPHONE (OUTPATIENT)
Dept: GASTROENTEROLOGY | Facility: CLINIC | Age: 60
End: 2020-07-09

## 2020-07-09 DIAGNOSIS — Z20.822 ENCOUNTER FOR LABORATORY TESTING FOR COVID-19 VIRUS: ICD-10-CM

## 2020-07-09 PROCEDURE — U0003 INFECTIOUS AGENT DETECTION BY NUCLEIC ACID (DNA OR RNA); SEVERE ACUTE RESPIRATORY SYNDROME CORONAVIRUS 2 (SARS-COV-2) (CORONAVIRUS DISEASE [COVID-19]), AMPLIFIED PROBE TECHNIQUE, MAKING USE OF HIGH THROUGHPUT TECHNOLOGIES AS DESCRIBED BY CMS-2020-01-R: HCPCS

## 2020-07-10 LAB
INPATIENT: NORMAL
SARS-COV-2 RNA SPEC QL NAA+PROBE: NOT DETECTED

## 2020-07-16 ENCOUNTER — ANESTHESIA (OUTPATIENT)
Dept: GASTROENTEROLOGY | Facility: AMBULARY SURGERY CENTER | Age: 60
End: 2020-07-16

## 2020-07-16 ENCOUNTER — HOSPITAL ENCOUNTER (OUTPATIENT)
Dept: GASTROENTEROLOGY | Facility: AMBULARY SURGERY CENTER | Age: 60
Setting detail: OUTPATIENT SURGERY
Discharge: HOME | End: 2020-07-16
Attending: INTERNAL MEDICINE | Admitting: INTERNAL MEDICINE
Payer: COMMERCIAL

## 2020-07-16 VITALS
TEMPERATURE: 97.5 F | SYSTOLIC BLOOD PRESSURE: 144 MMHG | DIASTOLIC BLOOD PRESSURE: 90 MMHG | RESPIRATION RATE: 18 BRPM | WEIGHT: 225 LBS | OXYGEN SATURATION: 98 % | BODY MASS INDEX: 27.4 KG/M2 | HEIGHT: 76 IN | HEART RATE: 69 BPM

## 2020-07-16 DIAGNOSIS — E78.5 HYPERLIPIDEMIA, UNSPECIFIED HYPERLIPIDEMIA TYPE: ICD-10-CM

## 2020-07-16 DIAGNOSIS — Z12.11 COLON CANCER SCREENING: ICD-10-CM

## 2020-07-16 PROCEDURE — 45385 COLONOSCOPY W/LESION REMOVAL: CPT | Performed by: INTERNAL MEDICINE

## 2020-07-16 PROCEDURE — 88305 TISSUE EXAM BY PATHOLOGIST: CPT | Performed by: PATHOLOGY

## 2020-07-16 PROCEDURE — 45380 COLONOSCOPY AND BIOPSY: CPT | Performed by: INTERNAL MEDICINE

## 2020-07-16 RX ORDER — SODIUM CHLORIDE, SODIUM LACTATE, POTASSIUM CHLORIDE, CALCIUM CHLORIDE 600; 310; 30; 20 MG/100ML; MG/100ML; MG/100ML; MG/100ML
50 INJECTION, SOLUTION INTRAVENOUS CONTINUOUS
Status: CANCELLED | OUTPATIENT
Start: 2020-07-16

## 2020-07-16 RX ORDER — PROPOFOL 10 MG/ML
INJECTION, EMULSION INTRAVENOUS AS NEEDED
Status: DISCONTINUED | OUTPATIENT
Start: 2020-07-16 | End: 2020-07-16 | Stop reason: SURG

## 2020-07-16 RX ORDER — ROSUVASTATIN CALCIUM 5 MG/1
TABLET, COATED ORAL
Qty: 90 TABLET | Refills: 0 | Status: SHIPPED | OUTPATIENT
Start: 2020-07-16 | End: 2020-10-08

## 2020-07-16 RX ORDER — SODIUM CHLORIDE, SODIUM LACTATE, POTASSIUM CHLORIDE, CALCIUM CHLORIDE 600; 310; 30; 20 MG/100ML; MG/100ML; MG/100ML; MG/100ML
INJECTION, SOLUTION INTRAVENOUS CONTINUOUS PRN
Status: DISCONTINUED | OUTPATIENT
Start: 2020-07-16 | End: 2020-07-16 | Stop reason: SURG

## 2020-07-16 RX ORDER — LIDOCAINE HYDROCHLORIDE 10 MG/ML
INJECTION, SOLUTION EPIDURAL; INFILTRATION; INTRACAUDAL; PERINEURAL AS NEEDED
Status: DISCONTINUED | OUTPATIENT
Start: 2020-07-16 | End: 2020-07-16 | Stop reason: SURG

## 2020-07-16 RX ADMIN — PROPOFOL 20 MG: 10 INJECTION, EMULSION INTRAVENOUS at 10:58

## 2020-07-16 RX ADMIN — PROPOFOL 80 MG: 10 INJECTION, EMULSION INTRAVENOUS at 10:45

## 2020-07-16 RX ADMIN — PROPOFOL 20 MG: 10 INJECTION, EMULSION INTRAVENOUS at 11:02

## 2020-07-16 RX ADMIN — SODIUM CHLORIDE, SODIUM LACTATE, POTASSIUM CHLORIDE, AND CALCIUM CHLORIDE: .6; .31; .03; .02 INJECTION, SOLUTION INTRAVENOUS at 10:37

## 2020-07-16 RX ADMIN — PROPOFOL 20 MG: 10 INJECTION, EMULSION INTRAVENOUS at 10:48

## 2020-07-16 RX ADMIN — PROPOFOL 20 MG: 10 INJECTION, EMULSION INTRAVENOUS at 10:53

## 2020-07-16 RX ADMIN — PROPOFOL 20 MG: 10 INJECTION, EMULSION INTRAVENOUS at 11:00

## 2020-07-16 RX ADMIN — PROPOFOL 30 MG: 10 INJECTION, EMULSION INTRAVENOUS at 10:51

## 2020-07-16 RX ADMIN — LIDOCAINE HYDROCHLORIDE 50 MG: 10 INJECTION, SOLUTION EPIDURAL; INFILTRATION; INTRACAUDAL; PERINEURAL at 10:45

## 2020-07-16 RX ADMIN — PROPOFOL 50 MG: 10 INJECTION, EMULSION INTRAVENOUS at 10:49

## 2020-07-16 RX ADMIN — PROPOFOL 20 MG: 10 INJECTION, EMULSION INTRAVENOUS at 10:46

## 2020-07-16 NOTE — ANESTHESIA PREPROCEDURE EVALUATION
Review of Systems/Medical History  Patient summary reviewed  Chart reviewed  No history of anesthetic complications     Cardiovascular  Hyperlipidemia,    Pulmonary  Negative pulmonary ROS        GI/Hepatic  Negative GI/hepatic ROS          Negative  ROS        Endo/Other  Negative endo/other ROS      GYN  Negative gynecology ROS          Hematology  Negative hematology ROS      Musculoskeletal  Negative musculoskeletal ROS        Neurology  Negative neurology ROS      Psychology   Anxiety, Depression ,              Physical Exam    Airway    Mallampati score: II  TM Distance: >3 FB  Neck ROM: full     Dental       Cardiovascular      Pulmonary      Other Findings        Anesthesia Plan  ASA Score- 2     Anesthesia Type- IV sedation with anesthesia with ASA Monitors  Additional Monitors:   Airway Plan:         Plan Factors-    Induction- intravenous  Postoperative Plan-     Informed Consent- Anesthetic plan and risks discussed with patient  I personally reviewed this patient with the CRNA  Discussed and agreed on the Anesthesia Plan with the CRNA  Mehdi Pisano

## 2020-07-16 NOTE — ANESTHESIA POSTPROCEDURE EVALUATION
Post-Op Assessment Note    CV Status:  Stable  Pain Score: 0    Pain management: adequate     Mental Status:  Alert and awake   Hydration Status:  Euvolemic   PONV Controlled:  Controlled   Airway Patency:  Patent   Post Op Vitals Reviewed: Yes      Staff: CRNA           /67 (07/16/20 1110)    Temp 98 4 °F (36 9 °C) (07/16/20 1110)    Pulse 70 (07/16/20 1110)   Resp 18 (07/16/20 1110)    SpO2 98 % (07/16/20 1110)

## 2020-07-16 NOTE — H&P
History and Physical - [facility]  Josh Miles 62 y o  male MRN: [id number]                  HPI: Josh Miles is a 62y o  year old male who presents for colonoscopy for colon cancer screening  REVIEW OF SYSTEMS: Per the HPI, and otherwise unremarkable  Historical Information   Past Medical History:   Diagnosis Date    [facility] )     Hyperlipidemia     Malignant melanoma of skin ([facility] )     last assessed 10/25/17, resolved 4/20/15     Past Surgical History:   Procedure Laterality Date    COLONOSCOPY  2012    Dr Hernandes Sellar   Impression 1/12/15    HEMORROIDECTOMY      SKIN LESION EXCISION Right     Elbow     TONSILECTOMY AND ADNOIDECTOMY       Social History   Social History     Substance and Sexual Activity   Alcohol Use Yes    Comment: Social      Social History     Substance and Sexual Activity   Drug Use No     Social History     Tobacco Use   Smoking Status Never Smoker   Smokeless Tobacco Never Used     Family History   Problem Relation Age of Onset    Diabetes Mother     Skin cancer Mother     Coronary artery disease Father        Meds/Allergies       (Not in a hospital admission)    Allergies   Allergen Reactions    Bee Venom Edema    Celecoxib Hives     Reaction Date: 21Nov2011; Category: Allergy;        Objective     There were no vitals taken for this visit  PHYSICAL EXAM    Gen: NAD  CV: RRR  CHEST: Clear  ABD: soft, NT/ND  EXT: no edema      ASSESSMENT/PLAN:  This is a 62y o  year old male here for colonoscopy, and he is stable and optimized for his procedure

## 2020-07-20 ENCOUNTER — TELEPHONE (OUTPATIENT)
Dept: UROLOGY | Facility: AMBULATORY SURGERY CENTER | Age: 60
End: 2020-07-20

## 2020-07-20 NOTE — TELEPHONE ENCOUNTER
Spoke to patient regarding his appointment and blood work that needed to be done for PSA  Patient stated he did have it done with other blood work looked through his chart and did not see it   He stated he could have it done tomorrow, so I rescheduled patient for the first available in August

## 2020-07-22 ENCOUNTER — TELEPHONE (OUTPATIENT)
Dept: UROLOGY | Facility: MEDICAL CENTER | Age: 60
End: 2020-07-22

## 2020-07-22 NOTE — TELEPHONE ENCOUNTER
Patient called in requesting psa to be emailed to [email or web address]  net on file  Emailed and confirmed

## 2020-07-28 ENCOUNTER — APPOINTMENT (OUTPATIENT)
Dept: LAB | Facility: HOSPITAL | Age: 60
End: 2020-07-28
Payer: COMMERCIAL

## 2020-07-28 ENCOUNTER — TRANSCRIBE ORDERS (OUTPATIENT)
Dept: ADMINISTRATIVE | Facility: HOSPITAL | Age: 60
End: 2020-07-28

## 2020-07-28 DIAGNOSIS — Z12.5 SPECIAL SCREENING FOR MALIGNANT NEOPLASM OF PROSTATE: ICD-10-CM

## 2020-07-28 DIAGNOSIS — Z12.5 SPECIAL SCREENING FOR MALIGNANT NEOPLASM OF PROSTATE: Primary | ICD-10-CM

## 2020-07-28 LAB — PSA SERPL-MCNC: 1.4 NG/ML (ref 0–4)

## 2020-07-28 PROCEDURE — 36415 COLL VENOUS BLD VENIPUNCTURE: CPT

## 2020-07-28 PROCEDURE — G0103 PSA SCREENING: HCPCS

## 2020-08-09 DIAGNOSIS — F41.9 ANXIETY: ICD-10-CM

## 2020-08-10 RX ORDER — ESCITALOPRAM OXALATE 20 MG/1
TABLET ORAL
Qty: 90 TABLET | Refills: 1 | Status: SHIPPED | OUTPATIENT
Start: 2020-08-10 | End: 2021-02-07

## 2020-08-25 ENCOUNTER — OFFICE VISIT (OUTPATIENT)
Dept: UROLOGY | Facility: AMBULATORY SURGERY CENTER | Age: 60
End: 2020-08-25
Payer: COMMERCIAL

## 2020-08-25 VITALS
WEIGHT: 225 LBS | BODY MASS INDEX: 27.4 KG/M2 | SYSTOLIC BLOOD PRESSURE: 144 MMHG | DIASTOLIC BLOOD PRESSURE: 72 MMHG | HEIGHT: 76 IN | TEMPERATURE: 98.4 F | HEART RATE: 62 BPM

## 2020-08-25 DIAGNOSIS — Z12.5 SCREENING PSA (PROSTATE SPECIFIC ANTIGEN): Primary | ICD-10-CM

## 2020-08-25 PROCEDURE — 3078F DIAST BP <80 MM HG: CPT | Performed by: NURSE PRACTITIONER

## 2020-08-25 PROCEDURE — 1036F TOBACCO NON-USER: CPT | Performed by: NURSE PRACTITIONER

## 2020-08-25 PROCEDURE — 99213 OFFICE O/P EST LOW 20 MIN: CPT | Performed by: NURSE PRACTITIONER

## 2020-08-25 PROCEDURE — 3077F SYST BP >= 140 MM HG: CPT | Performed by: NURSE PRACTITIONER

## 2020-08-25 PROCEDURE — 3008F BODY MASS INDEX DOCD: CPT | Performed by: NURSE PRACTITIONER

## 2020-08-25 NOTE — PROGRESS NOTES
8/25/2020    Henry Fontenot  [birth year]  [id number]      Assessment  -prostate cancer screening    Discussion/Plan  Allan Wiley is a 56 y o  male being managed by Dr Yecenia Velasquez  1  Prostate cancer screening- we reviewed the results of his recent PSA which is 1 4, previously 0 5  No significant findings noted on digital rectal examination today  He will return in 1 year for follow-up and repeat PSA, MICHELLE  Patient was instructed to call with any issues    -All questions answered, patient agrees with plan      History of Present Illness  56 y o  male with a history of prostate cancer screening presents today for follow up  Patient continues to do well without any urinary complaints  His last PSA from June 2019 was 1 5  He denies any gross hematuria or dysuria  Patient denies any strong family history of prostate cancer  No additional changes to his overall health  Review of Systems  Review of Systems   Constitutional: Negative  HENT: Negative  Respiratory: Negative  Cardiovascular: Negative  Gastrointestinal: Negative  Genitourinary: Negative for decreased urine volume, difficulty urinating, dysuria, flank pain, frequency, hematuria and urgency  Musculoskeletal: Negative  Skin: Negative  Neurological: Negative  Psychiatric/Behavioral: Negative  AUA SYMPTOM SCORE      Most Recent Value   AUA SYMPTOM SCORE   How often have you had a sensation of not emptying your bladder completely after you finished urinating? 0   How often have you had to urinate again less than two hours after you finished urinating? 2   How often have you found you stopped and started again several times when you urinate? 1   How often have you found it difficult to postpone urination? 1   How often have you had a weak urinary stream?  0   How often have you had to push or strain to begin urination?   0   How many times did you most typically get up to urinate from the time you went to bed at night until the time you got up in the morning?   2   Quality of Life: If you were to spend the rest of your life with your urinary condition just the way it is now, how would you feel about that?  1   AUA SYMPTOM SCORE  6          Past Medical History  Past Medical History:   Diagnosis Date    [facility]     Hyperlipidemia     Malignant melanoma of skin ([facility] )     last assessed 10/25/17, resolved 4/20/15       Past Social History  Past Surgical History:   Procedure Laterality Date    COLONOSCOPY  2012    Dr Hernandes Sellar   Impression 1/12/15    HEMORROIDECTOMY      SKIN LESION EXCISION Right     Elbow     TONSILECTOMY AND ADNOIDECTOMY         Past Family History  Family History   Problem Relation Age of Onset    Diabetes Mother     Skin cancer Mother     Coronary artery disease Father        Past Social history  Social History     Socioeconomic History    Marital status: /Civil Union     Spouse name: Not on file    Number of children: Not on file    Years of education: Not on file    Highest education level: Not on file   Occupational History    Not on file   Social Needs    Financial resource strain: Not on file    Food insecurity     Worry: Not on file     Inability: Not on file    Transportation needs     Medical: Not on file     Non-medical: Not on file   Tobacco Use    Smoking status: Never Smoker    Smokeless tobacco: Never Used   Substance and Sexual Activity    Alcohol use: Yes     Comment: Social     Drug use: No    Sexual activity: Not on file   Lifestyle    Physical activity     Days per week: Not on file     Minutes per session: Not on file    Stress: Not on file   Relationships    Social connections     Talks on phone: Not on file     Gets together: Not on file     Attends Manda[employer] service: Not on file     Active member of club or organization: Not on file     Attends meetings of clubs or organizations: Not on file     Relationship status: Not on file    Intimate partner violence Fear of current or ex partner: Not on file     Emotionally abused: Not on file     Physically abused: Not on file     Forced sexual activity: Not on file   Other Topics Concern    Not on file   Social History Narrative    Not on file       Current Medications  Current Outpatient Medications   Medication Sig Dispense Refill    amLODIPine (NORVASC) 5 mg tablet TAKE 2 TABLETS IN THE MORNING  180 tablet 1    Ascorbic Acid (VITAMIN C) 1000 MG tablet Take 1 tablet by mouth daily      escitalopram (LEXAPRO) 20 mg tablet TAKE 1 TABLET BY MOUTH EVERY DAY 90 tablet 1    rosuvastatin (CRESTOR) 5 mg tablet TAKE 1 TABLET BY MOUTH EVERY DAY 90 tablet 0    cyclobenzaprine (FLEXERIL) 10 mg tablet Take 1 tablet (10 mg total) by mouth 3 (three) times a day as needed for muscle spasms 30 tablet 0    EPINEPHrine (EpiPen 2-Sherman) 0 3 mg/0 3 mL SOAJ Inject 0 3 mL (0 3 mg total) into a muscle once for 1 dose 1 each 1    lidocaine (LIDODERM) 5 % Place 1 patch on the skin daily Remove & Discard patch within 12 hours or as directed by MD (Patient not taking: Reported on 4/27/2020) 30 patch 0    Na Sulfate-K Sulfate-Mg Sulf (Suprep Bowel Prep Kit) 17 5-3 13-1 6 GM/177ML SOLN Take as directed by the office (Patient not taking: Reported on 6/10/2020) 2 Bottle 0     No current facility-administered medications for this visit  Allergies  Allergies   Allergen Reactions    Bee Venom Edema    Celecoxib Hives     Reaction Date: 21Nov2011; Category: Allergy; Past Medical History, Social History, Family History, medications and allergies were reviewed  Vitals  Vitals:    08/25/20 1356   BP: 144/72   Pulse: 62   Temp: 98 4 °F (36 9 °C)   Weight: 102 kg (225 lb)   Height: 6' 4" (1 93 m)       Physical Exam  Physical Exam  Constitutional:       Appearance: Normal appearance  He is well-developed  HENT:      Head: Normocephalic  Eyes:      Pupils: Pupils are equal, round, and reactive to light     Neck:      Musculoskeletal: Normal range of motion  Pulmonary:      Effort: Pulmonary effort is normal    Abdominal:      Palpations: Abdomen is soft  Genitourinary:     Prostate: Normal       Rectum: Normal       Comments: Prostate 35gm, smooth, nontender  No nodules    Musculoskeletal: Normal range of motion  Skin:     General: Skin is warm and dry  Neurological:      General: No focal deficit present  Mental Status: He is alert and oriented to person, place, and time  Psychiatric:         Mood and Affect: Mood normal          Behavior: Behavior normal          Thought Content: Thought content normal          Judgment: Judgment normal          Results    I have personally reviewed all pertinent lab results and reviewed with patient  Lab Results   Component Value Date    PSA 1 4 07/28/2020    PSA 1 5 06/25/2019    PSA 1 7 05/22/2018     Lab Results   Component Value Date    GLUCOSE 85 11/05/2015    CALCIUM 9 3 06/23/2020     11/05/2015    K 3 6 06/23/2020    CO2 24 06/23/2020     06/23/2020    BUN 27 (H) 06/23/2020    CREATININE 0 90 06/23/2020     Lab Results   Component Value Date    WBC 6 68 08/14/2018    HGB 14 9 08/14/2018    HCT 42 3 08/14/2018    MCV 89 08/14/2018     08/14/2018     No results found for this or any previous visit (from the past 1 hour(s))

## 2020-08-28 DIAGNOSIS — I10 ESSENTIAL HYPERTENSION: ICD-10-CM

## 2020-08-28 RX ORDER — AMLODIPINE BESYLATE 5 MG/1
TABLET ORAL
Qty: 180 TABLET | Refills: 1 | Status: SHIPPED | OUTPATIENT
Start: 2020-08-28 | End: 2021-03-01

## 2020-10-08 DIAGNOSIS — E78.5 HYPERLIPIDEMIA, UNSPECIFIED HYPERLIPIDEMIA TYPE: ICD-10-CM

## 2020-10-08 RX ORDER — ROSUVASTATIN CALCIUM 5 MG/1
TABLET, COATED ORAL
Qty: 90 TABLET | Refills: 0 | Status: SHIPPED | OUTPATIENT
Start: 2020-10-08 | End: 2021-01-04

## 2020-10-12 ENCOUNTER — OFFICE VISIT (OUTPATIENT)
Dept: INTERNAL MEDICINE CLINIC | Facility: CLINIC | Age: 60
End: 2020-10-12
Payer: COMMERCIAL

## 2020-10-12 VITALS
HEART RATE: 70 BPM | WEIGHT: 226.4 LBS | OXYGEN SATURATION: 98 % | HEIGHT: 76 IN | BODY MASS INDEX: 27.57 KG/M2 | SYSTOLIC BLOOD PRESSURE: 132 MMHG | DIASTOLIC BLOOD PRESSURE: 72 MMHG | TEMPERATURE: 98.5 F | RESPIRATION RATE: 16 BRPM

## 2020-10-12 DIAGNOSIS — E78.5 HYPERLIPIDEMIA, UNSPECIFIED HYPERLIPIDEMIA TYPE: ICD-10-CM

## 2020-10-12 DIAGNOSIS — Z23 NEED FOR INFLUENZA VACCINATION: Primary | ICD-10-CM

## 2020-10-12 DIAGNOSIS — Z00.00 WELLNESS EXAMINATION: ICD-10-CM

## 2020-10-12 DIAGNOSIS — R73.01 ELEVATED FASTING BLOOD SUGAR: ICD-10-CM

## 2020-10-12 PROCEDURE — 3078F DIAST BP <80 MM HG: CPT | Performed by: INTERNAL MEDICINE

## 2020-10-12 PROCEDURE — 99396 PREV VISIT EST AGE 40-64: CPT | Performed by: INTERNAL MEDICINE

## 2020-10-12 PROCEDURE — 3725F SCREEN DEPRESSION PERFORMED: CPT | Performed by: INTERNAL MEDICINE

## 2020-10-12 PROCEDURE — 1036F TOBACCO NON-USER: CPT | Performed by: INTERNAL MEDICINE

## 2020-10-12 PROCEDURE — 90471 IMMUNIZATION ADMIN: CPT

## 2020-10-12 PROCEDURE — 3075F SYST BP GE 130 - 139MM HG: CPT | Performed by: INTERNAL MEDICINE

## 2020-10-12 PROCEDURE — 90682 RIV4 VACC RECOMBINANT DNA IM: CPT

## 2021-01-04 DIAGNOSIS — E78.5 HYPERLIPIDEMIA, UNSPECIFIED HYPERLIPIDEMIA TYPE: ICD-10-CM

## 2021-01-04 RX ORDER — ROSUVASTATIN CALCIUM 5 MG/1
TABLET, COATED ORAL
Qty: 90 TABLET | Refills: 0 | Status: SHIPPED | OUTPATIENT
Start: 2021-01-04 | End: 2021-04-01

## 2021-02-07 DIAGNOSIS — F41.9 ANXIETY: ICD-10-CM

## 2021-02-07 RX ORDER — ESCITALOPRAM OXALATE 20 MG/1
TABLET ORAL
Qty: 90 TABLET | Refills: 1 | Status: SHIPPED | OUTPATIENT
Start: 2021-02-07 | End: 2021-08-09

## 2021-02-28 DIAGNOSIS — I10 ESSENTIAL HYPERTENSION: ICD-10-CM

## 2021-03-01 RX ORDER — AMLODIPINE BESYLATE 5 MG/1
TABLET ORAL
Qty: 180 TABLET | Refills: 1 | Status: SHIPPED | OUTPATIENT
Start: 2021-03-01 | End: 2021-08-30

## 2021-03-10 DIAGNOSIS — Z23 ENCOUNTER FOR IMMUNIZATION: ICD-10-CM

## 2021-04-01 DIAGNOSIS — E78.5 HYPERLIPIDEMIA, UNSPECIFIED HYPERLIPIDEMIA TYPE: ICD-10-CM

## 2021-04-01 RX ORDER — ROSUVASTATIN CALCIUM 5 MG/1
TABLET, COATED ORAL
Qty: 90 TABLET | Refills: 0 | Status: SHIPPED | OUTPATIENT
Start: 2021-04-01 | End: 2021-06-24

## 2021-04-05 ENCOUNTER — LAB (OUTPATIENT)
Dept: LAB | Facility: HOSPITAL | Age: 61
End: 2021-04-05
Payer: COMMERCIAL

## 2021-04-05 DIAGNOSIS — R73.01 ELEVATED FASTING BLOOD SUGAR: ICD-10-CM

## 2021-04-05 DIAGNOSIS — E78.5 HYPERLIPIDEMIA, UNSPECIFIED HYPERLIPIDEMIA TYPE: ICD-10-CM

## 2021-04-05 LAB
ALBUMIN SERPL BCP-MCNC: 4 G/DL (ref 3.5–5)
ALP SERPL-CCNC: 78 U/L (ref 46–116)
ALT SERPL W P-5'-P-CCNC: 53 U/L (ref 12–78)
ANION GAP SERPL CALCULATED.3IONS-SCNC: 5 MMOL/L (ref 4–13)
AST SERPL W P-5'-P-CCNC: 28 U/L (ref 5–45)
BILIRUB SERPL-MCNC: 1.85 MG/DL (ref 0.2–1)
BUN SERPL-MCNC: 20 MG/DL (ref 5–25)
CALCIUM SERPL-MCNC: 9.1 MG/DL (ref 8.3–10.1)
CHLORIDE SERPL-SCNC: 107 MMOL/L (ref 100–108)
CHOLEST SERPL-MCNC: 165 MG/DL (ref 50–200)
CO2 SERPL-SCNC: 29 MMOL/L (ref 21–32)
CREAT SERPL-MCNC: 1.11 MG/DL (ref 0.6–1.3)
EST. AVERAGE GLUCOSE BLD GHB EST-MCNC: 100 MG/DL
GFR SERPL CREATININE-BSD FRML MDRD: 71 ML/MIN/1.73SQ M
GLUCOSE P FAST SERPL-MCNC: 96 MG/DL (ref 65–99)
HBA1C MFR BLD: 5.1 %
HDLC SERPL-MCNC: 57 MG/DL
LDLC SERPL CALC-MCNC: 85 MG/DL (ref 0–100)
POTASSIUM SERPL-SCNC: 3.7 MMOL/L (ref 3.5–5.3)
PROT SERPL-MCNC: 7.4 G/DL (ref 6.4–8.2)
SODIUM SERPL-SCNC: 141 MMOL/L (ref 136–145)
TRIGL SERPL-MCNC: 113 MG/DL

## 2021-04-05 PROCEDURE — 83036 HEMOGLOBIN GLYCOSYLATED A1C: CPT

## 2021-04-05 PROCEDURE — 36415 COLL VENOUS BLD VENIPUNCTURE: CPT

## 2021-04-05 PROCEDURE — 80061 LIPID PANEL: CPT

## 2021-04-05 PROCEDURE — 80053 COMPREHEN METABOLIC PANEL: CPT

## 2021-04-12 ENCOUNTER — OFFICE VISIT (OUTPATIENT)
Dept: INTERNAL MEDICINE CLINIC | Facility: CLINIC | Age: 61
End: 2021-04-12
Payer: COMMERCIAL

## 2021-04-12 VITALS
WEIGHT: 236.2 LBS | OXYGEN SATURATION: 98 % | HEIGHT: 76 IN | DIASTOLIC BLOOD PRESSURE: 80 MMHG | SYSTOLIC BLOOD PRESSURE: 122 MMHG | RESPIRATION RATE: 16 BRPM | HEART RATE: 72 BPM | BODY MASS INDEX: 28.76 KG/M2

## 2021-04-12 DIAGNOSIS — Z11.4 SCREENING FOR HIV (HUMAN IMMUNODEFICIENCY VIRUS): Primary | ICD-10-CM

## 2021-04-12 DIAGNOSIS — Z12.5 SCREENING PSA (PROSTATE SPECIFIC ANTIGEN): ICD-10-CM

## 2021-04-12 DIAGNOSIS — E78.5 HYPERLIPIDEMIA, UNSPECIFIED HYPERLIPIDEMIA TYPE: ICD-10-CM

## 2021-04-12 DIAGNOSIS — I10 ESSENTIAL (PRIMARY) HYPERTENSION: ICD-10-CM

## 2021-04-12 PROCEDURE — 1036F TOBACCO NON-USER: CPT | Performed by: INTERNAL MEDICINE

## 2021-04-12 PROCEDURE — 3725F SCREEN DEPRESSION PERFORMED: CPT | Performed by: INTERNAL MEDICINE

## 2021-04-12 PROCEDURE — 3074F SYST BP LT 130 MM HG: CPT | Performed by: INTERNAL MEDICINE

## 2021-04-12 PROCEDURE — 99213 OFFICE O/P EST LOW 20 MIN: CPT | Performed by: INTERNAL MEDICINE

## 2021-04-12 PROCEDURE — 3008F BODY MASS INDEX DOCD: CPT | Performed by: INTERNAL MEDICINE

## 2021-04-12 PROCEDURE — 3079F DIAST BP 80-89 MM HG: CPT | Performed by: INTERNAL MEDICINE

## 2021-04-12 NOTE — PROGRESS NOTES
Assessment/Plan:    Essential (primary) hypertension   Hypertension - controlled, I have counseled patient following healthy balance diet, I would like the patient reduce sodium, exercise routinely, I would like the patient continued the med current medical regiment and we will continue to monitor  Continue amlodipine 5 mg once daily    Hyperlipidemia   Hyperlipidemia controlled continue with current medical regiment recommend a low-cholesterol diet and recommend routine exercise we will continue to monitor the progress  Crestor 5 mg daily         Problem List Items Addressed This Visit        Cardiovascular and Mediastinum    Essential (primary) hypertension      Hypertension - controlled, I have counseled patient following healthy balance diet, I would like the patient reduce sodium, exercise routinely, I would like the patient continued the med current medical regiment and we will continue to monitor  Continue amlodipine 5 mg once daily         Relevant Orders    Comprehensive metabolic panel       Other    Hyperlipidemia      Hyperlipidemia controlled continue with current medical regiment recommend a low-cholesterol diet and recommend routine exercise we will continue to monitor the progress  Crestor 5 mg daily         Relevant Orders    Lipid Panel with Direct LDL reflex    Screening PSA (prostate specific antigen)    Relevant Orders    PSA, Total Screen      Other Visit Diagnoses     Screening for HIV (human immunodeficiency virus)    -  Primary           RTO in 6 months call if any problems  Subjective:      Patient ID: Zoë Pinto is a 56 y o  male  HPI 59-year old male coming in for a follow up visit regarding central hypertension, hyperlipidemia; The patient reports me compliant taking medications without untoward side effects the  The patient is here to review his medical condition, update me on the medical condition and the patient reports me no hospitalizations and no ER visits   Reports me overall very here to review laboratories  Routinely walking not going to the gym had the cov vaccine ,  2 1 miles 4 times per week   The following portions of the patient's history were reviewed and updated as appropriate: allergies, current medications, past family history, past medical history, past social history, past surgical history and problem list     Review of Systems   Constitutional: Negative for activity change, appetite change and unexpected weight change  Eyes: Negative for visual disturbance  Respiratory: Negative for cough and shortness of breath  Cardiovascular: Negative for chest pain  Gastrointestinal: Negative for abdominal pain, diarrhea, nausea and vomiting  Neurological: Negative for dizziness, light-headedness and headaches  Hematological: Negative for adenopathy  Objective:    Return in about 6 months (around 10/12/2021)  No results found  Allergies   Allergen Reactions    Bee Venom Edema    Celecoxib Hives     Reaction Date: 21Nov2011; Category: Allergy;         Past Medical History:   Diagnosis Date    Cancer ([facility] [address] [facility]     Hyperlipidemia     [facility]     last assessed 10/25/17, resolved 4/20/15     Past Surgical History:   Procedure Laterality Date    COLONOSCOPY  2012    Dr Stefano Henriquez   Impression 1/12/15    HEMORROIDECTOMY      SKIN LESION EXCISION Right     Elbow     TONSILECTOMY AND ADNOIDECTOMY       Current Outpatient Medications on File Prior to Visit   Medication Sig Dispense Refill    amLODIPine (NORVASC) 5 mg tablet TAKE 2 TABLETS BY MOUTH IN THE MORNING 180 tablet 1    Ascorbic Acid (VITAMIN C) 1000 MG tablet Take 1 tablet by mouth daily      escitalopram (LEXAPRO) 20 mg tablet TAKE 1 TABLET BY MOUTH EVERY DAY 90 tablet 1    rosuvastatin (CRESTOR) 5 mg tablet TAKE 1 TABLET BY MOUTH EVERY DAY 90 tablet 0    EPINEPHrine (EpiPen 2-Sherman) 0 3 mg/0 3 mL SOAJ Inject 0 3 mL (0 3 mg total) into a muscle once for 1 dose 1 each 1 No current facility-administered medications on file prior to visit        Family History   Problem Relation Age of Onset    Diabetes Mother     Skin cancer Mother     Coronary artery disease Father      Social History     Socioeconomic History    Marital status: /Civil Union     Spouse name: Not on file    Number of children: Not on file    Years of education: Not on file    Highest education level: Not on file   Occupational History    Not on file   Social Needs    Financial resource strain: Not on file    Food insecurity     Worry: Not on file     Inability: Not on file   [employer] needs     Medical: Not on file     Non-medical: Not on file   Tobacco Use    Smoking status: Never Smoker    Smokeless tobacco: Never Used   Substance and Sexual Activity    Alcohol use: Yes     Comment: Social     Drug use: No    Sexual activity: Not on file   Lifestyle    Physical activity     Days per week: Not on file     Minutes per session: Not on file    Stress: Not on file   Relationships    Social connections     Talks on phone: Not on file     Gets together: Not on file     Attends Catholic service: Not on file     Active member of club or organization: Not on file     Attends meetings of clubs or organizations: Not on file     Relationship status: Not on file    Intimate partner violence     Fear of current or ex partner: Not on file     Emotionally abused: Not on file     Physically abused: Not on file     Forced sexual activity: Not on file   Other Topics Concern    Not on file   Social History Narrative    Not on file     Vitals:    04/12/21 0823   BP: 122/80   Pulse: 72   Resp: 16   SpO2: 98%   Weight: 107 kg (236 lb 3 2 oz)   Height: 6' 4" (1 93 m)     Results for orders placed or performed in visit on 04/05/21   Comprehensive metabolic panel   Result Value Ref Range    Sodium 141 136 - 145 mmol/L    Potassium 3 7 3 5 - 5 3 mmol/L    Chloride 107 100 - 108 mmol/L    CO2 29 21 - 32 mmol/L    ANION GAP 5 4 - 13 mmol/L    BUN 20 5 - 25 mg/dL    Creatinine 1 11 0 60 - 1 30 mg/dL    Glucose, Fasting 96 65 - 99 mg/dL    Calcium 9 1 8 3 - 10 1 mg/dL    AST 28 5 - 45 U/L    ALT 53 12 - 78 U/L    Alkaline Phosphatase 78 46 - 116 U/L    Total Protein 7 4 6 4 - 8 2 g/dL    Albumin 4 0 3 5 - 5 0 g/dL    Total Bilirubin 1 85 (H) 0 20 - 1 00 mg/dL    eGFR 71 ml/min/1 73sq m   Hemoglobin A1C   Result Value Ref Range    Hemoglobin A1C 5 1 Normal 3 8-5 6%; PreDiabetic 5 7-6 4%; Diabetic >=6 5%; Glycemic control for adults with diabetes <7 0% %     mg/dl   Lipid Panel with Direct LDL reflex   Result Value Ref Range    Cholesterol 165 50 - 200 mg/dL    Triglycerides 113 <=150 mg/dL    HDL, Direct 57 >=40 mg/dL    LDL Calculated 85 0 - 100 mg/dL     Weight (last 2 days)     Date/Time   Weight    04/12/21 0823   107 (236 2)            Body mass index is 28 75 kg/m²  BP      Temp      Pulse     Resp      SpO2        Vitals:    04/12/21 0823   Weight: 107 kg (236 lb 3 2 oz)     Vitals:    04/12/21 0823   Weight: 107 kg (236 lb 3 2 oz)       /80   Pulse 72   Resp 16   Ht 6' 4" (1 93 m)   Wt 107 kg (236 lb 3 2 oz)   SpO2 98%   BMI 28 75 kg/m²          Physical Exam  Constitutional:       General: He is not in acute distress  Appearance: He is well-developed  He is not diaphoretic  HENT:      Head: Normocephalic and atraumatic  Right Ear: External ear normal       Left Ear: External ear normal    Eyes:      General: No scleral icterus  Right eye: No discharge  Left eye: No discharge  Conjunctiva/sclera: Conjunctivae normal       Pupils: Pupils are equal, round, and reactive to light  Neck:      Musculoskeletal: Neck supple  Cardiovascular:      Rate and Rhythm: Normal rate and regular rhythm  Heart sounds: Normal heart sounds  No murmur  No friction rub  No gallop  Pulmonary:      Effort: No respiratory distress  Breath sounds: No wheezing or rales  Abdominal:      General: Bowel sounds are normal  There is no distension  Palpations: Abdomen is soft  There is no mass  Tenderness: There is no abdominal tenderness  There is no guarding or rebound  Musculoskeletal:         General: No deformity  Lymphadenopathy:      Cervical: No cervical adenopathy  Neurological:      Mental Status: He is alert

## 2021-04-12 NOTE — ASSESSMENT & PLAN NOTE
Hypertension - controlled, I have counseled patient following healthy balance diet, I would like the patient reduce sodium, exercise routinely, I would like the patient continued the med current medical regiment and we will continue to monitor   Continue amlodipine 5 mg once daily

## 2021-04-12 NOTE — ASSESSMENT & PLAN NOTE
Hyperlipidemia controlled continue with current medical regiment recommend a low-cholesterol diet and recommend routine exercise we will continue to monitor the progress   Crestor 5 mg daily

## 2021-06-24 DIAGNOSIS — E78.5 HYPERLIPIDEMIA, UNSPECIFIED HYPERLIPIDEMIA TYPE: ICD-10-CM

## 2021-06-24 RX ORDER — ROSUVASTATIN CALCIUM 5 MG/1
TABLET, COATED ORAL
Qty: 90 TABLET | Refills: 0 | Status: SHIPPED | OUTPATIENT
Start: 2021-06-24 | End: 2021-09-16

## 2021-08-09 DIAGNOSIS — F41.9 ANXIETY: ICD-10-CM

## 2021-08-09 RX ORDER — ESCITALOPRAM OXALATE 20 MG/1
TABLET ORAL
Qty: 90 TABLET | Refills: 1 | Status: SHIPPED | OUTPATIENT
Start: 2021-08-09 | End: 2022-02-04

## 2021-08-13 ENCOUNTER — APPOINTMENT (OUTPATIENT)
Dept: LAB | Facility: CLINIC | Age: 61
End: 2021-08-13
Payer: COMMERCIAL

## 2021-08-13 DIAGNOSIS — Z12.5 SCREENING PSA (PROSTATE SPECIFIC ANTIGEN): ICD-10-CM

## 2021-08-13 LAB — PSA SERPL-MCNC: 2.7 NG/ML (ref 0–4)

## 2021-08-13 PROCEDURE — G0103 PSA SCREENING: HCPCS

## 2021-08-13 PROCEDURE — 36415 COLL VENOUS BLD VENIPUNCTURE: CPT

## 2021-08-29 DIAGNOSIS — I10 ESSENTIAL HYPERTENSION: ICD-10-CM

## 2021-08-30 RX ORDER — AMLODIPINE BESYLATE 5 MG/1
TABLET ORAL
Qty: 180 TABLET | Refills: 1 | Status: SHIPPED | OUTPATIENT
Start: 2021-08-30 | End: 2022-02-27

## 2021-09-13 ENCOUNTER — TELEPHONE (OUTPATIENT)
Dept: INTERNAL MEDICINE CLINIC | Facility: CLINIC | Age: 61
End: 2021-09-13

## 2021-09-13 NOTE — TELEPHONE ENCOUNTER
Patient received a call from his pharmacy that there is a cheaper statin that he can switch to but he wants to documented that he does not wish to switch his medication

## 2021-09-16 DIAGNOSIS — E78.5 HYPERLIPIDEMIA, UNSPECIFIED HYPERLIPIDEMIA TYPE: ICD-10-CM

## 2021-09-16 RX ORDER — ROSUVASTATIN CALCIUM 5 MG/1
TABLET, COATED ORAL
Qty: 90 TABLET | Refills: 0 | Status: SHIPPED | OUTPATIENT
Start: 2021-09-16 | End: 2021-12-09

## 2021-09-20 ENCOUNTER — OFFICE VISIT (OUTPATIENT)
Dept: UROLOGY | Facility: AMBULATORY SURGERY CENTER | Age: 61
End: 2021-09-20
Payer: COMMERCIAL

## 2021-09-20 VITALS
HEIGHT: 76 IN | WEIGHT: 234 LBS | HEART RATE: 72 BPM | SYSTOLIC BLOOD PRESSURE: 138 MMHG | DIASTOLIC BLOOD PRESSURE: 80 MMHG | BODY MASS INDEX: 28.49 KG/M2

## 2021-09-20 DIAGNOSIS — Z12.5 SCREENING FOR PROSTATE CANCER: Primary | ICD-10-CM

## 2021-09-20 DIAGNOSIS — R97.20 ELEVATED PSA: ICD-10-CM

## 2021-09-20 PROCEDURE — 3008F BODY MASS INDEX DOCD: CPT | Performed by: NURSE PRACTITIONER

## 2021-09-20 PROCEDURE — 1036F TOBACCO NON-USER: CPT | Performed by: NURSE PRACTITIONER

## 2021-09-20 PROCEDURE — 99213 OFFICE O/P EST LOW 20 MIN: CPT | Performed by: NURSE PRACTITIONER

## 2021-09-20 NOTE — PROGRESS NOTES
9/20/2021    Rositaavila Bossidel  [birth year]  [id number]      Assessment  -Routine prostate cancer screening  -Elevated PSA    Discussion/Plan  Wilber Ritchie is a 59 y o  male being managed by Dr Dylon Alex  1  Routine prostate cancer screening, elevated PSA- we reviewed the results of his recent PSA which is 2 7, previously 1 4  No significant findings noted on digital rectal examination today  We reviewed causes for false elevation  Recommend rechecking another PSA in the next 4 weeks and we will call with his results  If findings remain elevated, consider further testing with prostate biopsy or multiparametric MRI of prostate  Repeat PSA in the next 4 weeks and call with results  Determine follow-up thereafter  Patient was instructed to call sooner with any issues     -All questions answered, patient agrees with plan      History of Present Illness  59 y o  male with a history of prostate cancer screening presents today for follow up  Patient last seen in the office in August 2020  His prior PSA was 1 4  Patient denies any lower urinary tract symptoms, gross hematuria, or dysuria  He denies any strong family history of prostate cancer  No additional changes to his overall health  Review of Systems  Review of Systems   Constitutional: Negative  HENT: Negative  Respiratory: Negative  Cardiovascular: Negative  Gastrointestinal: Negative  Genitourinary: Negative for decreased urine volume, difficulty urinating, dysuria, flank pain, frequency, hematuria and urgency  Musculoskeletal: Negative  Skin: Negative  Neurological: Negative  Psychiatric/Behavioral: Negative  AUA SYMPTOM SCORE      Most Recent Value   AUA SYMPTOM SCORE   How often have you had a sensation of not emptying your bladder completely after you finished urinating? 0   How often have you had to urinate again less than two hours after you finished urinating?   1   How often have you found you stopped and started again several times when you urinate?  0   How often have you found it difficult to postpone urination? 1   How often have you had a weak urinary stream?  0   How often have you had to push or strain to begin urination? 0   How many times did you most typically get up to urinate from the time you went to bed at night until the time you got up in the morning?   2   Quality of Life: If you were to spend the rest of your life with your urinary condition just the way it is now, how would you feel about that?  0   AUA SYMPTOM SCORE  4          Past Medical History  Past Medical History:   Diagnosis Date    [facility] [address] [facility]     Hyperlipidemia     Malignant melanoma of skin [facility] [address] )     last assessed 10/25/17, resolved 4/20/15       Past Social History  Past Surgical History:   Procedure Laterality Date    COLONOSCOPY  2012    Dr Hong Bennett   Impression 1/12/15    HEMORROIDECTOMY      SKIN LESION EXCISION Right     Elbow     TONSILECTOMY AND ADNOIDECTOMY         Past Family History  Family History   Problem Relation Age of Onset    Diabetes Mother     Skin cancer Mother     Coronary artery disease Father        Past Social history  Social History     Socioeconomic History    Marital status: /Civil Union     Spouse name: Not on file    Number of children: Not on file    Years of education: Not on file    Highest education level: Not on file   Occupational History    Not on file   Tobacco Use    Smoking status: Never Smoker    Smokeless tobacco: Never Used   Vaping Use    Vaping Use: Never used   Substance and Sexual Activity    Alcohol use: Yes     Comment: Social     Drug use: No    Sexual activity: Not on file   Other Topics Concern    Not on file   Social History Narrative    Not on file     Social Determinants of Health     Financial Resource Strain:     Difficulty of Paying Living Expenses:    Food Insecurity:     Worried About Running Out of Food in the Last Year:     [address] in the Last Year:    Transportation Needs:     Lack of Transportation (Medical):  Lack of Transportation (Non-Medical):    Physical Activity:     Days of Exercise per Week:     Minutes of Exercise per Session:    Stress:     Feeling of Stress :    Social Connections:     Frequency of Communication with Friends and Family:     Frequency of Social Gatherings with Friends and Family:     Attends Evangelical Services:     Active Member of Clubs or Organizations:     Attends Club or Organization Meetings:     Marital Status:    Intimate Partner Violence:     Fear of Current or Ex-Partner:     Emotionally Abused:     Physically Abused:     Sexually Abused:        Current Medications  Current Outpatient Medications   Medication Sig Dispense Refill    amLODIPine (NORVASC) 5 mg tablet TAKE 2 TABLETS BY MOUTH IN THE MORNING 180 tablet 1    Ascorbic Acid (VITAMIN C) 1000 MG tablet Take 1 tablet by mouth daily      EPINEPHrine (EpiPen 2-Sherman) 0 3 mg/0 3 mL SOAJ Inject 0 3 mL (0 3 mg total) into a muscle once for 1 dose 1 each 1    escitalopram (LEXAPRO) 20 mg tablet TAKE 1 TABLET BY MOUTH EVERY DAY 90 tablet 1    rosuvastatin (CRESTOR) 5 mg tablet TAKE 1 TABLET BY MOUTH EVERY DAY 90 tablet 0     No current facility-administered medications for this visit  Allergies  Allergies   Allergen Reactions    Bee Venom Edema    Celecoxib Hives     Reaction Date: 21Nov2011; Category: Allergy; Past Medical History, Social History, Family History, medications and allergies were reviewed  Vitals  There were no vitals filed for this visit  Physical Exam  Physical Exam  Constitutional:       Appearance: Normal appearance  He is well-developed  HENT:      Head: Normocephalic  Eyes:      Pupils: Pupils are equal, round, and reactive to light  Pulmonary:      Effort: Pulmonary effort is normal    Abdominal:      Palpations: Abdomen is soft     Genitourinary:     Prostate: Normal       Rectum: Normal  Comments: Prostate 40 g, smooth, nontender, no nodules  Musculoskeletal:         General: Normal range of motion  Cervical back: Normal range of motion  Skin:     General: Skin is warm and dry  Neurological:      General: No focal deficit present  Mental Status: He is alert and oriented to person, place, and time  Psychiatric:         Mood and Affect: Mood normal          Behavior: Behavior normal          Thought Content: Thought content normal          Judgment: Judgment normal          Results    I have personally reviewed all pertinent lab results and reviewed with patient  Lab Results   Component Value Date    PSA 2 7 08/13/2021    PSA 1 4 07/28/2020    PSA 1 5 06/25/2019     Lab Results   Component Value Date    GLUCOSE 85 11/05/2015    CALCIUM 9 1 04/05/2021     11/05/2015    K 3 7 04/05/2021    CO2 29 04/05/2021     04/05/2021    BUN 20 04/05/2021    CREATININE 1 11 04/05/2021     Lab Results   Component Value Date    WBC 6 68 08/14/2018    HGB 14 9 08/14/2018    HCT 42 3 08/14/2018    MCV 89 08/14/2018     08/14/2018     No results found for this or any previous visit (from the past 1 hour(s))

## 2021-10-05 ENCOUNTER — APPOINTMENT (OUTPATIENT)
Dept: LAB | Facility: CLINIC | Age: 61
End: 2021-10-05
Payer: COMMERCIAL

## 2021-10-05 DIAGNOSIS — E78.5 HYPERLIPIDEMIA, UNSPECIFIED HYPERLIPIDEMIA TYPE: ICD-10-CM

## 2021-10-05 DIAGNOSIS — I10 ESSENTIAL (PRIMARY) HYPERTENSION: ICD-10-CM

## 2021-10-05 DIAGNOSIS — R97.20 ELEVATED PSA: ICD-10-CM

## 2021-10-05 LAB
ALBUMIN SERPL BCP-MCNC: 4 G/DL (ref 3.5–5)
ALP SERPL-CCNC: 81 U/L (ref 46–116)
ALT SERPL W P-5'-P-CCNC: 33 U/L (ref 12–78)
ANION GAP SERPL CALCULATED.3IONS-SCNC: 5 MMOL/L (ref 4–13)
AST SERPL W P-5'-P-CCNC: 21 U/L (ref 5–45)
BILIRUB SERPL-MCNC: 1.96 MG/DL (ref 0.2–1)
BUN SERPL-MCNC: 21 MG/DL (ref 5–25)
CALCIUM SERPL-MCNC: 9.3 MG/DL (ref 8.3–10.1)
CHLORIDE SERPL-SCNC: 106 MMOL/L (ref 100–108)
CHOLEST SERPL-MCNC: 180 MG/DL (ref 50–200)
CO2 SERPL-SCNC: 27 MMOL/L (ref 21–32)
CREAT SERPL-MCNC: 0.85 MG/DL (ref 0.6–1.3)
GFR SERPL CREATININE-BSD FRML MDRD: 93 ML/MIN/1.73SQ M
GLUCOSE P FAST SERPL-MCNC: 95 MG/DL (ref 65–99)
HDLC SERPL-MCNC: 55 MG/DL
LDLC SERPL CALC-MCNC: 101 MG/DL (ref 0–100)
POTASSIUM SERPL-SCNC: 3.4 MMOL/L (ref 3.5–5.3)
PROT SERPL-MCNC: 7.9 G/DL (ref 6.4–8.2)
PSA SERPL-MCNC: 2.7 NG/ML (ref 0–4)
SODIUM SERPL-SCNC: 138 MMOL/L (ref 136–145)
TRIGL SERPL-MCNC: 120 MG/DL

## 2021-10-05 PROCEDURE — G0103 PSA SCREENING: HCPCS

## 2021-10-05 PROCEDURE — 80061 LIPID PANEL: CPT

## 2021-10-05 PROCEDURE — 36415 COLL VENOUS BLD VENIPUNCTURE: CPT

## 2021-10-05 PROCEDURE — 80053 COMPREHEN METABOLIC PANEL: CPT

## 2021-10-12 ENCOUNTER — OFFICE VISIT (OUTPATIENT)
Dept: INTERNAL MEDICINE CLINIC | Facility: CLINIC | Age: 61
End: 2021-10-12
Payer: COMMERCIAL

## 2021-10-12 ENCOUNTER — TELEPHONE (OUTPATIENT)
Dept: UROLOGY | Facility: AMBULATORY SURGERY CENTER | Age: 61
End: 2021-10-12

## 2021-10-12 VITALS
WEIGHT: 238 LBS | HEIGHT: 76 IN | BODY MASS INDEX: 28.98 KG/M2 | HEART RATE: 72 BPM | OXYGEN SATURATION: 95 % | DIASTOLIC BLOOD PRESSURE: 86 MMHG | SYSTOLIC BLOOD PRESSURE: 142 MMHG

## 2021-10-12 DIAGNOSIS — Z13.1 SCREENING FOR DIABETES MELLITUS: ICD-10-CM

## 2021-10-12 DIAGNOSIS — Z23 NEED FOR INFLUENZA VACCINATION: ICD-10-CM

## 2021-10-12 DIAGNOSIS — R97.20 ELEVATED PSA: Primary | ICD-10-CM

## 2021-10-12 DIAGNOSIS — Z00.00 WELLNESS EXAMINATION: ICD-10-CM

## 2021-10-12 DIAGNOSIS — I10 ESSENTIAL HYPERTENSION: ICD-10-CM

## 2021-10-12 DIAGNOSIS — Z13.6 SCREENING FOR CARDIOVASCULAR CONDITION: ICD-10-CM

## 2021-10-12 DIAGNOSIS — Z00.00 ANNUAL PHYSICAL EXAM: Primary | ICD-10-CM

## 2021-10-12 DIAGNOSIS — C61 PROSTATE CANCER (HCC): ICD-10-CM

## 2021-10-12 PROCEDURE — 3725F SCREEN DEPRESSION PERFORMED: CPT | Performed by: INTERNAL MEDICINE

## 2021-10-12 PROCEDURE — 90471 IMMUNIZATION ADMIN: CPT

## 2021-10-12 PROCEDURE — 90682 RIV4 VACC RECOMBINANT DNA IM: CPT

## 2021-10-12 PROCEDURE — 99396 PREV VISIT EST AGE 40-64: CPT | Performed by: INTERNAL MEDICINE

## 2021-10-12 PROCEDURE — 3008F BODY MASS INDEX DOCD: CPT | Performed by: INTERNAL MEDICINE

## 2021-10-12 PROCEDURE — 1036F TOBACCO NON-USER: CPT | Performed by: INTERNAL MEDICINE

## 2021-11-11 ENCOUNTER — HOSPITAL ENCOUNTER (OUTPATIENT)
Dept: RADIOLOGY | Age: 61
Discharge: HOME | End: 2021-11-11
Payer: COMMERCIAL

## 2021-11-11 DIAGNOSIS — R97.20 ELEVATED PSA: ICD-10-CM

## 2021-11-11 PROCEDURE — A9585 GADOBUTROL INJECTION: HCPCS | Performed by: NURSE PRACTITIONER

## 2021-11-11 PROCEDURE — 72197 MRI PELVIS W/O & W/DYE: CPT

## 2021-11-11 PROCEDURE — G1004 CDSM NDSC: HCPCS

## 2021-11-11 PROCEDURE — 76377 3D RENDER W/INTRP POSTPROCES: CPT

## 2021-11-11 RX ADMIN — GADOBUTROL 10 ML: 604.72 INJECTION INTRAVENOUS at 10:02

## 2021-11-15 ENCOUNTER — LAB (OUTPATIENT)
Dept: LAB | Facility: HOSPITAL | Age: 61
End: 2021-11-15
Payer: COMMERCIAL

## 2021-11-15 ENCOUNTER — OFFICE VISIT (OUTPATIENT)
Dept: PODIATRY | Facility: CLINIC | Age: 61
End: 2021-11-15
Payer: COMMERCIAL

## 2021-11-15 VITALS
DIASTOLIC BLOOD PRESSURE: 85 MMHG | BODY MASS INDEX: 28.73 KG/M2 | WEIGHT: 236 LBS | SYSTOLIC BLOOD PRESSURE: 137 MMHG | HEART RATE: 65 BPM

## 2021-11-15 DIAGNOSIS — M72.2 PLANTAR FASCIITIS: Primary | ICD-10-CM

## 2021-11-15 DIAGNOSIS — I10 ESSENTIAL HYPERTENSION: ICD-10-CM

## 2021-11-15 LAB
ANION GAP SERPL CALCULATED.3IONS-SCNC: 4 MMOL/L (ref 4–13)
BUN SERPL-MCNC: 17 MG/DL (ref 5–25)
CALCIUM SERPL-MCNC: 9.6 MG/DL (ref 8.3–10.1)
CHLORIDE SERPL-SCNC: 105 MMOL/L (ref 100–108)
CO2 SERPL-SCNC: 29 MMOL/L (ref 21–32)
CREAT SERPL-MCNC: 1 MG/DL (ref 0.6–1.3)
GFR SERPL CREATININE-BSD FRML MDRD: 80 ML/MIN/1.73SQ M
GLUCOSE P FAST SERPL-MCNC: 105 MG/DL (ref 65–99)
POTASSIUM SERPL-SCNC: 3.8 MMOL/L (ref 3.5–5.3)
SODIUM SERPL-SCNC: 138 MMOL/L (ref 136–145)

## 2021-11-15 PROCEDURE — 80048 BASIC METABOLIC PNL TOTAL CA: CPT

## 2021-11-15 PROCEDURE — 82088 ASSAY OF ALDOSTERONE: CPT

## 2021-11-15 PROCEDURE — 36415 COLL VENOUS BLD VENIPUNCTURE: CPT

## 2021-11-15 PROCEDURE — 84244 ASSAY OF RENIN: CPT

## 2021-11-15 PROCEDURE — 99243 OFF/OP CNSLTJ NEW/EST LOW 30: CPT | Performed by: PODIATRIST

## 2021-11-15 NOTE — LETTER
January 27, 2022     Ranjan Todd  [address]  [address] [place] [address]    Patient: Ladonna Lambert   YOB: 1960   Date of Visit: 11/15/2021       Dear Dr Brewster Slider: Thank you for referring Nasir Brown to me for evaluation  Below are my notes for this consultation  If you have questions, please do not hesitate to call me  I look forward to following your patient along with you  Sincerely,        Tima Ash DPM        CC: No Recipients  Renetta Akhtar  1/26/2022 11:12 PM  Signed  This patient was seen on 11/15/21  My role is Foot , Ankle, and Wound Specialist    SUBJECTIVE    Chief Complaint:  Heel pain     Patient ID: Ladonna Lambert is a 59 y o  male  Marilyn Smith is here as a new patient with a cc of  Heel pain  He notes that he initially had significant heel pain Right that worst when getting up from rest  He notes however over the last two weeks since making his appointment it's been resolving significantly  The following portions of the patient's history were reviewed and updated as appropriate: allergies, current medications, past family history, past medical history, past social history, past surgical history and problem list     Review of Systems   Constitutional: Negative  Respiratory: Negative  Musculoskeletal: Positive for arthralgias and gait problem  OBJECTIVE      /85   Pulse 65   Wt 107 kg (236 lb)   BMI 28 73 kg/m²     Foot/Ankle Musculoskeletal Exam    General      Neurological: alert      General additional comments:  I note muscle function of the anterior, posterior, evertor and invertor muscle groups of the lower leg are intact and normal  I note ROM of the ankle joint, subtalar joint, Choparts and Lisfranc's joint complexes and metatarsophalangeal joints are WNL without crepitus nor restrictions bilaterally  I note minimal pain Right heel at the plantar fascia origin         Physical Exam  Vitals and nursing note reviewed  Constitutional:       General: He is not in acute distress  Appearance: Normal appearance  He is not ill-appearing, toxic-appearing or diaphoretic  HENT:      Head: Normocephalic and atraumatic  Pulmonary:      Effort: Pulmonary effort is normal       Breath sounds: Normal breath sounds  Musculoskeletal:         General: Tenderness present  Comments:  I note muscle function of the anterior, posterior, evertor and invertor muscle groups of the lower leg are intact and normal  I note ROM of the ankle joint, subtalar joint, Choparts and Lisfranc's joint complexes and metatarsophalangeal joints are WNL without crepitus nor restrictions bilaterally  I note minimal pain Right heel at the plantar fascia origin  Skin:     General: Skin is warm and dry  Capillary Refill: Capillary refill takes less than 2 seconds  Neurological:      Mental Status: He is alert  ASSESSMENT     Diagnoses and all orders for this visit:    Plantar fasciitis         Problem List Items Addressed This Visit        Musculoskeletal and Integument    Plantar fasciitis - Primary              PLAN  I recommended three times a day ice application to the heel and stretching exercises taught  I recommended to abstain from walking without a supportive shoe in the house  I discussed proper shoegear (a cross- type sneaker or workboot that is in good repair)  I'm not going to recommend any further treatment and he may return PRN with an exacerbation of pain

## 2021-11-18 LAB — ALDOST SERPL-MCNC: 7 NG/DL (ref 0–30)

## 2021-11-19 LAB
ALDOST SERPL-MCNC: 6.9 NG/DL (ref 0–30)
ALDOST/RENIN PLAS-RTO: 9.8 {RATIO} (ref 0–30)
RENIN PLAS-CCNC: 0.7 NG/ML/HR (ref 0.17–5.38)

## 2021-12-09 DIAGNOSIS — E78.5 HYPERLIPIDEMIA, UNSPECIFIED HYPERLIPIDEMIA TYPE: ICD-10-CM

## 2021-12-09 RX ORDER — ROSUVASTATIN CALCIUM 5 MG/1
TABLET, COATED ORAL
Qty: 90 TABLET | Refills: 0 | Status: SHIPPED | OUTPATIENT
Start: 2021-12-09 | End: 2022-03-03

## 2022-01-26 PROBLEM — M72.2 PLANTAR FASCIITIS: Status: ACTIVE | Noted: 2022-01-26

## 2022-01-27 NOTE — PROGRESS NOTES
This patient was seen on 11/15/21  My role is Foot , Ankle, and Wound Specialist    SUBJECTIVE    Chief Complaint:  Heel pain     Patient ID: Robb Cortes is a 59 y o  male  Esperanza Baptiste is here as a new patient with a cc of  Heel pain  He notes that he initially had significant heel pain Right that worst when getting up from rest  He notes however over the last two weeks since making his appointment it's been resolving significantly  The following portions of the patient's history were reviewed and updated as appropriate: allergies, current medications, past family history, past medical history, past social history, past surgical history and problem list     Review of Systems   Constitutional: Negative  Respiratory: Negative  Musculoskeletal: Positive for arthralgias and gait problem  OBJECTIVE      /85   Pulse 65   Wt 107 kg (236 lb)   BMI 28 73 kg/m²     Foot/Ankle Musculoskeletal Exam    General      Neurological: alert      General additional comments:  I note muscle function of the anterior, posterior, evertor and invertor muscle groups of the lower leg are intact and normal  I note ROM of the ankle joint, subtalar joint, Choparts and Lisfranc's joint complexes and metatarsophalangeal joints are WNL without crepitus nor restrictions bilaterally  I note minimal pain Right heel at the plantar fascia origin  Physical Exam  Vitals and nursing note reviewed  Constitutional:       General: He is not in acute distress  Appearance: Normal appearance  He is not ill-appearing, toxic-appearing or diaphoretic  HENT:      Head: Normocephalic and atraumatic  Pulmonary:      Effort: Pulmonary effort is normal       Breath sounds: Normal breath sounds  Musculoskeletal:         General: Tenderness present        Comments:  I note muscle function of the anterior, posterior, evertor and invertor muscle groups of the lower leg are intact and normal  I note ROM of the ankle joint, subtalar joint, Choparts and Lisfranc's joint complexes and metatarsophalangeal joints are WNL without crepitus nor restrictions bilaterally  I note minimal pain Right heel at the plantar fascia origin  Skin:     General: Skin is warm and dry  Capillary Refill: Capillary refill takes less than 2 seconds  Neurological:      Mental Status: He is alert  ASSESSMENT     Diagnoses and all orders for this visit:    Plantar fasciitis         Problem List Items Addressed This Visit        Musculoskeletal and Integument    Plantar fasciitis - Primary              PLAN  I recommended three times a day ice application to the heel and stretching exercises taught  I recommended to abstain from walking without a supportive shoe in the house  I discussed proper shoegear (a cross- type sneaker or workboot that is in good repair)  I'm not going to recommend any further treatment and he may return PRN with an exacerbation of pain

## 2022-02-04 DIAGNOSIS — F41.9 ANXIETY: ICD-10-CM

## 2022-02-04 RX ORDER — ESCITALOPRAM OXALATE 20 MG/1
TABLET ORAL
Qty: 90 TABLET | Refills: 1 | Status: SHIPPED | OUTPATIENT
Start: 2022-02-04 | End: 2022-07-22

## 2022-02-17 ENCOUNTER — APPOINTMENT (OUTPATIENT)
Dept: RADIOLOGY | Facility: CLINIC | Age: 62
End: 2022-02-17
Payer: COMMERCIAL

## 2022-02-17 ENCOUNTER — OFFICE VISIT (OUTPATIENT)
Dept: OBGYN CLINIC | Facility: CLINIC | Age: 62
End: 2022-02-17
Payer: COMMERCIAL

## 2022-02-17 VITALS
SYSTOLIC BLOOD PRESSURE: 132 MMHG | WEIGHT: 238 LBS | DIASTOLIC BLOOD PRESSURE: 84 MMHG | HEIGHT: 76 IN | BODY MASS INDEX: 28.98 KG/M2

## 2022-02-17 DIAGNOSIS — M75.42 IMPINGEMENT SYNDROME OF LEFT SHOULDER: ICD-10-CM

## 2022-02-17 DIAGNOSIS — M75.82 TENDINITIS OF LEFT ROTATOR CUFF: ICD-10-CM

## 2022-02-17 DIAGNOSIS — M25.512 ACUTE PAIN OF LEFT SHOULDER: ICD-10-CM

## 2022-02-17 DIAGNOSIS — M25.512 ACUTE PAIN OF LEFT SHOULDER: Primary | ICD-10-CM

## 2022-02-17 PROCEDURE — 20610 DRAIN/INJ JOINT/BURSA W/O US: CPT | Performed by: PHYSICIAN ASSISTANT

## 2022-02-17 PROCEDURE — 3008F BODY MASS INDEX DOCD: CPT | Performed by: PHYSICIAN ASSISTANT

## 2022-02-17 PROCEDURE — 73030 X-RAY EXAM OF SHOULDER: CPT

## 2022-02-17 PROCEDURE — 1036F TOBACCO NON-USER: CPT | Performed by: PHYSICIAN ASSISTANT

## 2022-02-17 PROCEDURE — 99204 OFFICE O/P NEW MOD 45 MIN: CPT | Performed by: PHYSICIAN ASSISTANT

## 2022-02-17 RX ORDER — BUPIVACAINE HYDROCHLORIDE 2.5 MG/ML
2 INJECTION, SOLUTION INFILTRATION; PERINEURAL
Status: COMPLETED | OUTPATIENT
Start: 2022-02-17 | End: 2022-02-17

## 2022-02-17 RX ORDER — TRIAMCINOLONE ACETONIDE 40 MG/ML
80 INJECTION, SUSPENSION INTRA-ARTICULAR; INTRAMUSCULAR
Status: COMPLETED | OUTPATIENT
Start: 2022-02-17 | End: 2022-02-17

## 2022-02-17 RX ADMIN — BUPIVACAINE HYDROCHLORIDE 2 ML: 2.5 INJECTION, SOLUTION INFILTRATION; PERINEURAL at 09:46

## 2022-02-17 RX ADMIN — TRIAMCINOLONE ACETONIDE 80 MG: 40 INJECTION, SUSPENSION INTRA-ARTICULAR; INTRAMUSCULAR at 09:46

## 2022-02-17 NOTE — PATIENT INSTRUCTIONS
Rotator Cuff Tendinitis   AMBULATORY CARE:   Rotator cuff tendinitis  is inflammation of the tendons in your shoulder joint  A tendon is a cord of tough tissue that connects your muscles to your bones  The rotator cuff is made up of a group of muscles and tendons that hold the shoulder joint in place  Common signs and symptoms:   · Pain and swelling in your shoulder, especially when you lift your arm over your head    · Pain that is worse after you sleep on the affected shoulder    · Pain can become worse and you may have pain even when you are resting    · Shoulder and arm weakness    Call your doctor or orthopedist if:   · You have sudden shortness of breath or chest pain  · Any part of your arm is numb, tingly, cold, blue, or pale  · You have pain and swelling in your shoulder even after you take pain medicine  · Your skin is itchy, swollen, or has a rash  · Your symptoms are not getting better or are getting worse  · You have questions or concerns about your condition or care  Treatment  may include any of the following:  · Medicines  such as steroids or NSAIDs may be used to reduce swelling  This can help relieve pain  Steroids may be injected into the rotator cuff area  NSAIDs are available without a doctor's order  Ask your healthcare provider which medicine is right for you  Ask how much to take and when to take it  Take as directed  NSAIDs can cause stomach bleeding or kidney problems if not taken correctly  · Surgery  may be needed if the pain and tightening in your shoulder do not go away  This may also be done if pain worsens or is so severe that it affects your daily activities  During surgery, your healthcare provider may remove bone spurs and inflamed tissue around the shoulder  · Physical therapy  can help you improve movement and strength, and decrease pain  A physical therapist will teach you safe exercises   The exercises may help you move your shoulder normally again and strengthen your rotator cuff  You may learn changes to make to your daily activities that will help decrease stress on your tendons  Care for your rotator cuff tendinitis at home:   · Rest as directed  Limit activity on your affected shoulder to decrease stress on the tendon  This may help prevent further damage, decrease pain, and promote healing  · Apply ice on your shoulder area  Ice helps decrease swelling and pain  Ice may also help prevent tissue damage  Use an ice pack, or put crushed ice in a plastic bag  Cover it with a towel and place it on your shoulder for 15 to 20 minutes every hour or as directed  · Keep your shoulder in the correct position so it will heal faster  This may be done by increasing the height of armrests while you work, drive, and sit  Try not to sleep on the side of your injured shoulder  If you are a woman, wear a sports bra so that the straps are closer to your neck  This may help decrease stress in the affected shoulder  Follow up with your doctor or orthopedist as directed:  Write down your questions so you remember to ask them during your visits  © Copyright [employer] 2021 Information is for End User's use only and may not be sold, redistributed or otherwise used for commercial purposes  All illustrations and images included in CareNotes® are the copyrighted property of [employer]  or [facility]   The above information is an  only  It is not intended as medical advice for individual conditions or treatments  Talk to your doctor, nurse or pharmacist before following any medical regimen to see if it is safe and effective for you

## 2022-02-17 NOTE — PROGRESS NOTES
Orthopaedic Surgery - Office Note  Gerrie Bosworth (29 y o  male)   : 1960   MRN: [id number]  Encounter Date: 2022    Chief Complaint   Patient presents with    Left Shoulder - Pain         Assessment/Plan  Diagnoses and all orders for this visit:    Acute pain of left shoulder  -     XR shoulder 2+ vw left; Future  -     Ambulatory Referral to Physical Therapy; Future    Tendinitis of left rotator cuff  -     Ambulatory Referral to Physical Therapy; Future    Impingement syndrome of left shoulder  -     Ambulatory Referral to Physical Therapy; Future    Other orders  -     Large joint arthrocentesis    The diagnosis as well as treatment options were reviewed with the patient in the office today  He was advised that without the history of trauma a full-thickness rotator cuff tear is felt unlikely and I would recommend a subacromial cortisone injection in combination with formal physical therapy  Patient may ice the shoulder 20 minutes on 1 hour off 3 times a day  He may use an over-the-counter Aleve 1 tablet twice daily with food stopping calling for any stomach upset occurs  He will return in 3-4 weeks for repeat evaluation with the ortho surgeon at which time we could consider an MRI if symptoms were not improved with conservative treatment  Patient call the office with any questions or concerns  Return 3-4 weeks with Dr Tigre Hough  History of Present Illness  This is a new patient with left shoulder pain  He denies any trauma  He is right-hand dominant  The pain is located in the shoulder and radiates into the lateral deltoid region  He has tried over-the-counter medications which have not helped  The pain is increased with reaching for things overhead or trying to reach behind his back  He has not had problems like this in the past   He does not notice any significant weakness  Shoulder motion below the shoulder level does not bother him    No paresthesias or neck pain are reported  He denies being diabetic  Review of Systems  Pertinent items are noted in HPI  All other systems were reviewed and are negative  Physical Exam  /84   Ht 6' 4" (1 93 m)   Wt 108 kg (238 lb)   BMI 28 97 kg/m²   Cons: Appears well  No apparent distress  Psych: Alert  Oriented x3  Mood and affect normal   Eyes: PERRLA, EOMI  Resp: Normal effort  No audible wheezing or stridor  CV: Palpable pulse  No discernable arrhythmia  Lymph:  No palpable cervical, axillary, or inguinal lymphadenopathy  Skin: Warm  No palpable masses  No visible lesions  Neuro: Normal muscle tone  Normal and symmetric DTR's  Left shoulder has no skin breakdown lesions or signs of infection  Active forward flexion is to 165°  Passively to 170  Internal rotation and external rotation are tight with pain at end range of motion  Neurovascularly is grossly intact in the left upper extremity  He has a markedly positive impingement sign  Supraspinatus strength testing is 4+ out of 5 with pain against resistance  He has no AC joint tenderness  No deltoid atrophy  Elbow exam is within normal limits  He has no shoulder instability  X-rays performed in the office today three views of the left shoulder show no acute fractures or dislocations  Mild AC joint degenerative changes are noted  Type 2 acromion is seen  No calcific tendinitis is noted  These are read from an orthopedic standpoint will await official radiologist interpretation  Studies Reviewed      Large joint arthrocentesis: L subacromial bursa  Universal Protocol:  Consent: Verbal consent obtained    Risks and benefits: risks, benefits and alternatives were discussed  Consent given by: patient  Patient understanding: patient states understanding of the procedure being performed  Patient consent: the patient's understanding of the procedure matches consent given  Relevant documents: relevant documents present and verified  Test results: test results available and properly labeled  Site marked: the operative site was marked  Radiology Images displayed and confirmed  If images not available, report reviewed: imaging studies available    Supporting Documentation  Indications: pain   Procedure Details  Location: shoulder - L subacromial bursa  Needle size: 22 G  Ultrasound guidance: no  Approach: posterolateral  Medications administered: 2 mL bupivacaine 0 25 %; 80 mg triamcinolone acetonide 40 mg/mL    Patient tolerance: patient tolerated the procedure well with no immediate complications  Dressing:  Sterile dressing applied        Medical, Surgical, Family, and Social History  The patient's medical history, family history, and social history, were reviewed and updated as appropriate  Past Medical History:   Diagnosis Date    Cancer ([address] )     Hyperlipidemia     Malignant melanoma of skin ([facility] )     last assessed 10/25/17, resolved 4/20/15       Past Surgical History:   Procedure Laterality Date    COLONOSCOPY  2012    Dr Cisneros Cons 1/12/15    HEMORROIDECTOMY      SKIN LESION EXCISION Right     Elbow     TONSILECTOMY AND ADNOIDECTOMY         Family History   Problem Relation Age of Onset    Diabetes Mother     Skin cancer Mother     Coronary artery disease Father        Social History     Occupational History    Not on file   Tobacco Use    Smoking status: Never Smoker    Smokeless tobacco: Never Used   Vaping Use    Vaping Use: Never used   Substance and Sexual Activity    Alcohol use: Yes     Comment: Social     Drug use: No    Sexual activity: Yes       Allergies   Allergen Reactions    Bee Venom Edema    Celecoxib Hives     Reaction Date: 21Nov2011; Category:  Allergy;          Current Outpatient Medications:     amLODIPine (NORVASC) 5 mg tablet, TAKE 2 TABLETS BY MOUTH IN THE MORNING, Disp: 180 tablet, Rfl: 1    Ascorbic Acid (VITAMIN C) 1000 MG tablet, Take 1 tablet by mouth daily, Disp: , Rfl:     EPINEPHrine (EpiPen 2-Sherman) 0 3 mg/0 3 mL SOAJ, Inject 0 3 mL (0 3 mg total) into a muscle once for 1 dose, Disp: 1 each, Rfl: 1    escitalopram (LEXAPRO) 20 mg tablet, TAKE 1 TABLET BY MOUTH EVERY DAY, Disp: 90 tablet, Rfl: 1    rosuvastatin (CRESTOR) 5 mg tablet, TAKE 1 TABLET BY MOUTH EVERY DAY, Disp: 90 tablet, Rfl: 0      Peewee Mullins PA-C

## 2022-02-22 ENCOUNTER — EVALUATION (OUTPATIENT)
Dept: PHYSICAL THERAPY | Facility: CLINIC | Age: 62
End: 2022-02-22
Payer: COMMERCIAL

## 2022-02-22 DIAGNOSIS — M75.42 IMPINGEMENT SYNDROME OF LEFT SHOULDER: ICD-10-CM

## 2022-02-22 DIAGNOSIS — M25.512 ACUTE PAIN OF LEFT SHOULDER: ICD-10-CM

## 2022-02-22 DIAGNOSIS — M75.82 TENDINITIS OF LEFT ROTATOR CUFF: ICD-10-CM

## 2022-02-22 PROCEDURE — 97140 MANUAL THERAPY 1/> REGIONS: CPT

## 2022-02-22 PROCEDURE — 97161 PT EVAL LOW COMPLEX 20 MIN: CPT

## 2022-02-22 NOTE — PROGRESS NOTES
PT Evaluation     Today's date: 2022  Patient name: Debbie Bliss  : [birth year]  MRN: [id number]  Referring provider: CHANELLE Blue*  Dx:   Encounter Diagnosis     ICD-10-CM    1  Acute pain of left shoulder  M25 512 Ambulatory Referral to Physical Therapy   2  Tendinitis of left rotator cuff  M75 82 Ambulatory Referral to Physical Therapy   3  Impingement syndrome of left shoulder  M75 42 Ambulatory Referral to Physical Therapy                  Assessment  Assessment details: Pt is a 59 y o  male presenting to PT with L shoulder pain not associated with acute trauma  PT findings include: strength deficits, ROM deficits, positive shoulder impingement special tests  Pt does present recreation of pain to palpation of proximal bicep tendon as well as pain to palpation of insertion of pec major  Pt's signs and symptoms consistent with patients diagnosis; cannot rule out biceps tendinopathy per findings  Pt will benefit from skilled PT to address above impairments to return to PLOF with less restriction and to reach functional goals  Impairments: abnormal or restricted ROM, impaired physical strength, lacks appropriate home exercise program and poor posture     Symptom irritability: lowUnderstanding of Dx/Px/POC: good   Prognosis: good    Goals  1  Pt will demonstrate understanding of HEP and POC in order to improve compliance with course of rehab in 2 weeks  2  Pt will demonstrate awareness of appropriate posture with seated, standing and functional dynamic reaching activities in order to decrease excessive loads/pain with ADL's in 3 weeks  3  Pt's IR/ER MMT will improve by at least 1 grade MMT to decrease shoulder joint loads by d/c    4  Pt will have decreased pain at worst to 2/10 in order for pt to return to PLOF by d/c       Plan  Patient would benefit from: skilled physical therapy  Planned modality interventions: low level laser therapy  Planned therapy interventions: manual therapy, neuromuscular re-education, patient education, postural training, strengthening, stretching, therapeutic activities, therapeutic exercise and home exercise program  Frequency: 2x week  Duration in weeks: 8  Treatment plan discussed with: patient        Subjective Evaluation    History of Present Illness  Mechanism of injury: Pt arrives with primary complaints of L shoulder pain for few months duration  Pt denies any injury associated with this pain  Pt states that he has pain with lifting arm past shoulder height and reaching  Pt states pain diminishes at rest  No prior injury to L shoulder  Pt states that on 3/17, he is following up with orthopedic physician, advised to initiate therapy until that time  Quality of life: good    Pain  Current pain ratin  At best pain ratin  At worst pain ratin  Location: L shoulder  Quality: dull ache and sharp  Relieving factors: support and rest      Diagnostic Tests  X-ray: normal  Treatments  Previous treatment: injection treatment  Patient Goals  Patient goals for therapy: decreased pain, increased motion, increased strength and independence with ADLs/IADLs          Objective    Flowsheet Rows      Most Recent Value   PT/OT G-Codes    Current Score 63   Projected Score 71      Shoulder ROM (L):  Flex: 160° (pain at end range)  Abd: 155° (pain at end range)    Shoulder Strength (L):  Flex: 4/5 (slight pain)  ABD: 4/5 (pain)  ER: 4/5 (pain)  IR: 5/5 (pain)    Shoulder special tests:  Neer: positive  Aamirkin Daniel: positive  Bicep Load: negative  Speeds: negative    Palpation: tenderness/recreation of pain, proximal bicep tendon long head and short head        Precautions: None      Manuals             IASTM proximal bicep transverse fx DL            Shoulder joint mob Inf Gr II-III DL            Laser L shoulder                          Neuro Re-Ed                                                                                                        Ther Ex Row HEP demo            Shoulder Ext HEP demo            Shoulder ER Grn HEP demo            Shoulder IR Grn HEP demo            Bent over [place]             Prone periscapular lift T, Ext                                                   UBE             Ther Activity                                       Gait Training                                       Modalities

## 2022-02-24 ENCOUNTER — OFFICE VISIT (OUTPATIENT)
Dept: PHYSICAL THERAPY | Facility: CLINIC | Age: 62
End: 2022-02-24
Payer: COMMERCIAL

## 2022-02-24 DIAGNOSIS — M25.512 ACUTE PAIN OF LEFT SHOULDER: Primary | ICD-10-CM

## 2022-02-24 DIAGNOSIS — M75.42 IMPINGEMENT SYNDROME OF LEFT SHOULDER: ICD-10-CM

## 2022-02-24 DIAGNOSIS — M75.82 TENDINITIS OF LEFT ROTATOR CUFF: ICD-10-CM

## 2022-02-24 PROCEDURE — 97530 THERAPEUTIC ACTIVITIES: CPT

## 2022-02-24 PROCEDURE — 97110 THERAPEUTIC EXERCISES: CPT

## 2022-02-24 PROCEDURE — 97140 MANUAL THERAPY 1/> REGIONS: CPT

## 2022-02-24 NOTE — PROGRESS NOTES
Daily Note     Today's date: 2022  Patient name: Ladonna Lambert  :   MRN: [id number]  Referring provider: CHANELLE White*  Dx:   Encounter Diagnosis     ICD-10-CM    1  Acute pain of left shoulder  M25 512    2  Tendinitis of left rotator cuff  M75 82    3  Impingement syndrome of left shoulder  M75 42                   Subjective: Patient reports pain only when using his shoulder  Objective: See treatment diary below      Assessment: Patient reports no increased pain during or post tx  Plan: Continue per plan of care        Precautions: None      Manuals            IASTM proximal bicep transverse fx DL            Shoulder joint mob Inf Gr II-III DL            Laser L shoulder             PROM  HA           Neuro Re-Ed                                                                                                        Ther Ex             Row HEP demo Black 2x10           Shoulder Ext HEP demo Black 2x10           Shoulder ER Grn HEP demo Grn x20           Shoulder IR Grn HEP demo grn x20           Bent over Row  8# x20           Bicep Curl  8# x20           Prone periscapular lift T, Ext 2x10           scap punches  3" 2x10           tricep ext  CC 50# 2x10           B/L ER  GTB 5" x20           UBE  3'/3'           Ther Activity                                       Gait Training                                       Modalities

## 2022-02-27 DIAGNOSIS — I10 ESSENTIAL HYPERTENSION: ICD-10-CM

## 2022-02-27 RX ORDER — AMLODIPINE BESYLATE 5 MG/1
TABLET ORAL
Qty: 180 TABLET | Refills: 1 | Status: SHIPPED | OUTPATIENT
Start: 2022-02-27

## 2022-03-01 ENCOUNTER — OFFICE VISIT (OUTPATIENT)
Dept: PHYSICAL THERAPY | Facility: CLINIC | Age: 62
End: 2022-03-01
Payer: COMMERCIAL

## 2022-03-01 DIAGNOSIS — M75.82 TENDINITIS OF LEFT ROTATOR CUFF: ICD-10-CM

## 2022-03-01 DIAGNOSIS — M25.512 ACUTE PAIN OF LEFT SHOULDER: Primary | ICD-10-CM

## 2022-03-01 DIAGNOSIS — M75.42 IMPINGEMENT SYNDROME OF LEFT SHOULDER: ICD-10-CM

## 2022-03-01 PROCEDURE — 97140 MANUAL THERAPY 1/> REGIONS: CPT

## 2022-03-01 PROCEDURE — 97110 THERAPEUTIC EXERCISES: CPT

## 2022-03-01 NOTE — PROGRESS NOTES
Daily Note     Today's date: 3/1/2022  Patient name: Jeremy Sandhoff  :   MRN: [id number]  Referring provider: CHANELLE Louise*  Dx:   Encounter Diagnosis     ICD-10-CM    1  Acute pain of left shoulder  M25 512    2  Tendinitis of left rotator cuff  M75 82    3  Impingement syndrome of left shoulder  M75 42                   Subjective: Pt reports no significant changes since last visit  Objective: See treatment diary below      Assessment: Cues provided to add hold times and slow quality movement; pt with fatigue post treatment  Pt educated to continue avoiding aggravating activities  Pt will benefit from continued skilled PT  Plan: Continue per plan of care        Precautions: None      Manuals 2/22 2/24 3/1          IASTM proximal bicep transverse fx DL  transverse fx DL          Shoulder joint mob Inf Gr II-III DL  DL inf Gr II-III          Laser L shoulder             PROM  HA           Neuro Re-Ed                                                                                                        Ther Ex             Row HEP demo Black 2x10 CC 50# 3x10 w/ hold          Shoulder Ext HEP demo Black 2x10 Black 3x10          Shoulder ER Grn HEP demo Grn x20 Grn x20          Shoulder IR Grn HEP demo grn x20 Double Red x20          Bent over Row  8# x20 8# x20          Bicep Curl  8# x20 8# 2x10           Prone periscapular lift T, Ext 2x10           scap punches  3" 2x10           tricep ext  CC 50# 2x10 CC L arm 30# 3x10          B/L ER  GTB 5" x20           UBE  3'/3' 3'/3'          Ther Activity                                       Gait Training                                       Modalities

## 2022-03-03 ENCOUNTER — OFFICE VISIT (OUTPATIENT)
Dept: PHYSICAL THERAPY | Facility: CLINIC | Age: 62
End: 2022-03-03
Payer: COMMERCIAL

## 2022-03-03 DIAGNOSIS — M25.512 ACUTE PAIN OF LEFT SHOULDER: Primary | ICD-10-CM

## 2022-03-03 DIAGNOSIS — M75.42 IMPINGEMENT SYNDROME OF LEFT SHOULDER: ICD-10-CM

## 2022-03-03 DIAGNOSIS — M75.82 TENDINITIS OF LEFT ROTATOR CUFF: ICD-10-CM

## 2022-03-03 DIAGNOSIS — E78.5 HYPERLIPIDEMIA, UNSPECIFIED HYPERLIPIDEMIA TYPE: ICD-10-CM

## 2022-03-03 PROCEDURE — 97140 MANUAL THERAPY 1/> REGIONS: CPT

## 2022-03-03 PROCEDURE — 97110 THERAPEUTIC EXERCISES: CPT

## 2022-03-03 RX ORDER — ROSUVASTATIN CALCIUM 5 MG/1
TABLET, COATED ORAL
Qty: 90 TABLET | Refills: 0 | Status: SHIPPED | OUTPATIENT
Start: 2022-03-03 | End: 2022-04-15 | Stop reason: SDUPTHER

## 2022-03-03 NOTE — PROGRESS NOTES
Daily Note     Today's date: 3/3/2022  Patient name: Mark Uribe  :   MRN: [id number]  Referring provider: CHANELLE Huerta*  Dx:   Encounter Diagnosis     ICD-10-CM    1  Acute pain of left shoulder  M25 512    2  Tendinitis of left rotator cuff  M75 82    3  Impingement syndrome of left shoulder  M75 42                   Subjective: Pt reports his L shldr has been feeling about the same  Pain seems to present itself most when reaching overhead  Did have some soreness after LV      Objective: See treatment diary below      Assessment: Tolerated treatment well  Continued with program as outlined below  Most discomfort noted with TB resisted ER today, noting this discomfort was tolerable and nothing significant  Did have TTP initially with transverse friction massage to prox bicep tendon, but soreness improved with consistent pressure  Patient would benefit from continued PT to further improve strength, reduce pain, and maximize overall function  Plan: Continue per plan of care        Precautions: None      Manuals 2/22 2/24 3/1 3/3         IASTM proximal bicep transverse fx DL  transverse fx DL transverse fx AFB         Shoulder joint mob Inf Gr II-III DL  DL inf Gr II-III          Laser L shoulder             PROM  HA  AFB         Neuro Re-Ed                                                                                                        Ther Ex             Row HEP demo Black 2x10 CC 50# 3x10 w/ hold CC 50# 3x10 w/ hold         Shoulder Ext HEP demo Black 2x10 Black 3x10 Black 3x10         Shoulder ER Grn HEP demo Grn x20 Grn x20 Grn x20         Shoulder IR Grn HEP demo grn x20 Double Red x20 Double Red x30         Bent over Row  8# x20 8# x20 8# x30         Bicep Curl  8# x20 8# 2x10  8# 2x10         Prone periscapular lift T, Ext 2x10           scap punches  3" 2x10           tricep ext  CC 50# 2x10 CC L arm 30# 3x10 CC L arm 30# 3x10         B/L ER  GTB 5" x20  GTB 5" x20         UBE  3'/3' 3'/3' 3'/3'         Ther Activity                                       Gait Training                                       Modalities

## 2022-03-08 ENCOUNTER — OFFICE VISIT (OUTPATIENT)
Dept: PHYSICAL THERAPY | Facility: CLINIC | Age: 62
End: 2022-03-08
Payer: COMMERCIAL

## 2022-03-08 DIAGNOSIS — M75.82 TENDINITIS OF LEFT ROTATOR CUFF: ICD-10-CM

## 2022-03-08 DIAGNOSIS — M25.512 ACUTE PAIN OF LEFT SHOULDER: Primary | ICD-10-CM

## 2022-03-08 DIAGNOSIS — M75.42 IMPINGEMENT SYNDROME OF LEFT SHOULDER: ICD-10-CM

## 2022-03-08 PROCEDURE — 97112 NEUROMUSCULAR REEDUCATION: CPT

## 2022-03-08 PROCEDURE — 97110 THERAPEUTIC EXERCISES: CPT

## 2022-03-08 PROCEDURE — 97140 MANUAL THERAPY 1/> REGIONS: CPT

## 2022-03-08 NOTE — PROGRESS NOTES
Daily Note     Today's date: 3/8/2022  Patient name: Queta Rojas  : [birth year]  MRN: [id number]  Referring provider: CHANELLE Lopes*  Dx:   Encounter Diagnosis     ICD-10-CM    1  Acute pain of left shoulder  M25 512    2  Tendinitis of left rotator cuff  M75 82    3  Impingement syndrome of left shoulder  M75 42                   Subjective: No significant improvement in overall pain yet with reaching  Objective: See treatment diary below      Assessment: Tolerated treatment well  Inconsistent pain with rowing today secondary to biceps overuse and poor scapular neuromuscular control  Significant cueing required throughout for improved MT/LT activation throughout  Significant pec guarding contributing to poor shoulder mechanics with doorway stretch added to program to correct - tolerated extremely well with decreased pain post session  Plan: Continue per plan of care        Precautions: None      Manuals 2/22 2/24 3/1 3/3 3/8        IASTM proximal bicep transverse fx DL  transverse fx DL transverse fx AFB         Shoulder joint mob Inf Gr II-III DL  DL inf Gr II-III  ATS with STM        Laser L shoulder             PROM  HA  AFB         Neuro Re-Ed                                                                                                        Ther Ex             Row HEP demo Black 2x10 CC 50# 3x10 w/ hold CC 50# 3x10 w/ hold CC 50# 3x10        Shoulder Ext HEP demo Black 2x10 Black 3x10 Black 3x10 Black 3x10        Shoulder ER Grn HEP demo Grn x20 Grn x20 Grn x20 Grn x20        Shoulder IR Grn HEP demo grn x20 Double Red x20 Double Red x30 Double R x30        Bent over Row  8# x20 8# x20 8# x30 PAIN        Bicep Curl  8# x20 8# 2x10  8# 2x10 10# 3x10        Prone Scapular Retraction T, Ext 2x10   20x        Prone Shoulder Extension  3" 2x10   5x10 s        tricep ext  CC 50# 2x10 CC L arm 30# 3x10 CC L arm 30# 3x10 40# 3x10        B/L ER  GTB 5" x20  GTB 5" x20 GTB 5" x20        UBE 3'/3' 3'/3' 3'/3' 3'/3'        Doorway Str     10x10' hold        Ther Activity                          Gait Training                                       Modalities

## 2022-03-10 ENCOUNTER — OFFICE VISIT (OUTPATIENT)
Dept: PHYSICAL THERAPY | Facility: CLINIC | Age: 62
End: 2022-03-10
Payer: COMMERCIAL

## 2022-03-10 DIAGNOSIS — M25.512 ACUTE PAIN OF LEFT SHOULDER: Primary | ICD-10-CM

## 2022-03-10 DIAGNOSIS — M75.82 TENDINITIS OF LEFT ROTATOR CUFF: ICD-10-CM

## 2022-03-10 DIAGNOSIS — M75.42 IMPINGEMENT SYNDROME OF LEFT SHOULDER: ICD-10-CM

## 2022-03-10 PROCEDURE — 97140 MANUAL THERAPY 1/> REGIONS: CPT

## 2022-03-10 PROCEDURE — 97110 THERAPEUTIC EXERCISES: CPT

## 2022-03-10 PROCEDURE — 97112 NEUROMUSCULAR REEDUCATION: CPT

## 2022-03-10 NOTE — PROGRESS NOTES
Daily Note     Today's date: 3/10/2022  Patient name: Axel Ruffin  : [birth year]  MRN: [id number]  Referring provider: CHANELLE Yoon*  Dx:   Encounter Diagnosis     ICD-10-CM    1  Acute pain of left shoulder  M25 512    2  Tendinitis of left rotator cuff  M75 82    3  Impingement syndrome of left shoulder  M75 42                   Subjective: Pt offers no new complaints upon presentation  Objective: See treatment diary below      Assessment: Tolerated treatment well  Continued with current POC this session with no complaints throughout session  Pec tightness still present with minimal cuing needed throughout to promote proper form with exercises since pt had a tendency to compensate with his UT's  Plan: Continue per plan of care        Precautions: None      Manuals 2/22 2/24 3/1 3/3 3/8 3/10       IASTM proximal bicep transverse fx DL  transverse fx DL transverse fx AFB         Shoulder joint mob Inf Gr II-III DL  DL inf Gr II-III  ATS with STM        Laser L shoulder             PROM  HA  AFB  MM w/ STM       Neuro Re-Ed                                                                                                        Ther Ex             Row HEP demo Black 2x10 CC 50# 3x10 w/ hold CC 50# 3x10 w/ hold CC 50# 3x10 CC 50# 3x10       Shoulder Ext HEP demo Black 2x10 Black 3x10 Black 3x10 Black 3x10 Black 3x10       Shoulder ER Grn HEP demo Grn x20 Grn x20 Grn x20 Grn x20 Dbl Grn x20       Shoulder IR Grn HEP demo grn x20 Double Red x20 Double Red x30 Double R x30 Double R x30       Bent over Row  8# x20 8# x20 8# x30 PAIN        Bicep Curl  8# x20 8# 2x10  8# 2x10 10# 3x10 10# 3x10       Prone Scapular Retraction T, Ext 2x10   20x 20x       Prone Shoulder Extension  3" 2x10   5x10 s 3" 2c10       tricep ext  CC 50# 2x10 CC L arm 30# 3x10 CC L arm 30# 3x10 40# 3x10 40# 3x10       B/L ER  GTB 5" x20  GTB 5" x20 GTB 5" x20 GTB 5" 2x10       UBE  3'/3' 3'/3' 3'/3' 3'/3' 3'/3'       Doorway Str     10x10' hold 10x10" hold       Ther Activity                          Gait Training                                       Modalities

## 2022-03-15 ENCOUNTER — OFFICE VISIT (OUTPATIENT)
Dept: PHYSICAL THERAPY | Facility: CLINIC | Age: 62
End: 2022-03-15
Payer: COMMERCIAL

## 2022-03-15 DIAGNOSIS — M25.512 ACUTE PAIN OF LEFT SHOULDER: Primary | ICD-10-CM

## 2022-03-15 DIAGNOSIS — M75.82 TENDINITIS OF LEFT ROTATOR CUFF: ICD-10-CM

## 2022-03-15 DIAGNOSIS — M75.42 IMPINGEMENT SYNDROME OF LEFT SHOULDER: ICD-10-CM

## 2022-03-15 PROCEDURE — 97110 THERAPEUTIC EXERCISES: CPT

## 2022-03-15 PROCEDURE — 97140 MANUAL THERAPY 1/> REGIONS: CPT

## 2022-03-15 PROCEDURE — 97530 THERAPEUTIC ACTIVITIES: CPT

## 2022-03-15 NOTE — PROGRESS NOTES
Daily Note     Today's date: 3/15/2022  Patient name: Axel Ruffin  :   MRN: [id number]  Referring provider: CHANELLE Yoon*  Dx:   Encounter Diagnosis     ICD-10-CM    1  Acute pain of left shoulder  M25 512    2  Tendinitis of left rotator cuff  M75 82    3  Impingement syndrome of left shoulder  M75 42                   Subjective: Patient reports his shoulder is starting to feel better  Objective: See treatment diary below      Assessment: Patient reports increased pain with PROM flexion  Plan: Continue per plan of care        Precautions: None      Manuals 2/24 3/1 3/3 3/8 3/10 3/15      IASTM proximal bicep  transverse fx DL transverse fx AFB         Shoulder joint mob  DL inf Gr II-III  ATS with STM        Laser L shoulder            LEFT PROM HA  AFB  MM w/ STM HA      Neuro Re-Ed                                                                                                Ther Ex            Row Black 2x10 CC 50# 3x10 w/ hold CC 50# 3x10 w/ hold CC 50# 3x10 CC 50# 3x10 CC 50# 3x10      Shoulder Ext Black 2x10 Black 3x10 Black 3x10 Black 3x10 Black 3x10 Black 3x10      Shoulder ER Grn x20 Grn x20 Grn x20 Grn x20 Dbl Grn x20 Dbl grn x30      Shoulder IR grn x20 Double Red x20 Double Red x30 Double R x30 Double R x30 double grn x30      Bent over Row 8# x20 8# x20 8# x30 PAIN        Bicep Curl 8# x20 8# 2x10  8# 2x10 10# 3x10 10# 3x10 10# 3x10      Prone Scapular Retraction 2x10   20x 20x 20x      Prone Shoulder Extension 3" 2x10   5x10 s 3" 2c10 3"2x10      tricep ext CC 50# 2x10 CC L arm 30# 3x10 CC L arm 30# 3x10 40# 3x10 40# 3x10 40# 3x10      B/L ER GTB 5" x20  GTB 5" x20 GTB 5" x20 GTB 5" 2x10 GTB 5" 2x10      UBE 3'/3' 3'/3' 3'/3' 3'/3' 3'/3' 3'/3      Doorway Str    10x10' hold 10x10" hold 10"x10      Ther Activity                        Gait Training                                    Modalities

## 2022-03-17 ENCOUNTER — OFFICE VISIT (OUTPATIENT)
Dept: OBGYN CLINIC | Facility: CLINIC | Age: 62
End: 2022-03-17
Payer: COMMERCIAL

## 2022-03-17 VITALS
WEIGHT: 233 LBS | SYSTOLIC BLOOD PRESSURE: 120 MMHG | DIASTOLIC BLOOD PRESSURE: 80 MMHG | HEIGHT: 76 IN | BODY MASS INDEX: 28.37 KG/M2

## 2022-03-17 DIAGNOSIS — M75.82 TENDINITIS OF LEFT ROTATOR CUFF: Primary | ICD-10-CM

## 2022-03-17 DIAGNOSIS — M75.42 IMPINGEMENT SYNDROME OF LEFT SHOULDER: ICD-10-CM

## 2022-03-17 PROCEDURE — 99214 OFFICE O/P EST MOD 30 MIN: CPT | Performed by: ORTHOPAEDIC SURGERY

## 2022-03-17 PROCEDURE — 3008F BODY MASS INDEX DOCD: CPT | Performed by: ORTHOPAEDIC SURGERY

## 2022-03-17 PROCEDURE — 1036F TOBACCO NON-USER: CPT | Performed by: ORTHOPAEDIC SURGERY

## 2022-03-17 NOTE — PROGRESS NOTES
Assessment:     1  Tendinitis of left rotator cuff    2  Impingement syndrome of left shoulder        Plan:     Problem List Items Addressed This Visit        Musculoskeletal and Integument    Tendinitis of left rotator cuff - Primary      Other Visit Diagnoses     Impingement syndrome of left shoulder            Findings consistent with left shoulder tendonitis and impingement currently asymptomatic  Imaging and prognosis reviewed  Patient is doing much better following cortisone and physical therapy  No further treatment needed today  He can finish up physical therapy and then transition to HEP  No current restrictions at this time  See patient back on as needed basis  All patient's questions were answered to his satisfaction  This note is created using dictation transcription  It may contain typographical errors, grammatical errors, improperly dictated words, background noise and other errors  Subjective:     Patient ID: Axel Ruffin is a 59 y o  male  Chief Complaint:  59 yr old male in for evaluation of his left shoulder  Gradual progression of shoulder pain w/o injury or trauma prior to seeing CHANELLE Ceron 4 weeks ago  He was given treated for impingement and tendonitis with cortisone injection and physical therapy  He has attending 7 sessions of physical therapy  He reports therapy is going very well  He has regained his motion and strength  He reports a tinge of pain at end range overhead but otherwise doing daily activities pain free  He denies any neck pain, numbness or tingling in arm  Information on patient's intake form was reviewed  Allergy:  Allergies   Allergen Reactions    Bee Venom Edema    Celecoxib Hives     Reaction Date: 21Nov2011; Category: Allergy;       Medications:  all current active meds have been reviewed  Past Medical History:  Past Medical History:   Diagnosis Date    [facility] [address] [facility]     Hyperlipidemia     [facility]     last assessed 10/25/17, resolved 4/20/15     Past Surgical History:  Past Surgical History:   Procedure Laterality Date    COLONOSCOPY  2012    Dr Carmina Mace 1/12/15    HEMORROIDECTOMY      SKIN LESION EXCISION Right     Elbow     TONSILECTOMY AND ADNOIDECTOMY       Family History:  Family History   Problem Relation Age of Onset    Diabetes Mother     Skin cancer Mother     Coronary artery disease Father      Social History:  Social History     Substance and Sexual Activity   Alcohol Use Yes    Comment: Social      Social History     Substance and Sexual Activity   Drug Use No     Social History     Tobacco Use   Smoking Status Never Smoker   Smokeless Tobacco Never Used     Review of Systems   Constitutional: Negative for chills and fever  HENT: Negative for ear pain and sore throat  Eyes: Negative for pain and visual disturbance  Respiratory: Negative for cough and shortness of breath  Cardiovascular: Negative for chest pain and palpitations  Gastrointestinal: Negative for abdominal pain and vomiting  Genitourinary: Negative for dysuria and hematuria  Musculoskeletal: Negative for arthralgias, back pain, joint swelling and neck pain  Skin: Negative for color change and rash  Neurological: Negative for seizures and syncope  Psychiatric/Behavioral: Negative  All other systems reviewed and are negative  Objective:  BP Readings from Last 1 Encounters:   03/17/22 120/80      Wt Readings from Last 1 Encounters:   03/17/22 106 kg (233 lb)      BMI:   Estimated body mass index is 28 36 kg/m² as calculated from the following:    Height as of this encounter: 6' 4" (1 93 m)  Weight as of this encounter: 106 kg (233 lb)  BSA:   Estimated body surface area is 2 37 meters squared as calculated from the following:    Height as of this encounter: 6' 4" (1 93 m)  Weight as of this encounter: 106 kg (233 lb)  Physical Exam  Vitals and nursing note reviewed     Constitutional:       Appearance: Normal appearance  He is well-developed  HENT:      Head: Normocephalic and atraumatic  Right Ear: External ear normal       Left Ear: External ear normal    Eyes:      Extraocular Movements: Extraocular movements intact  Conjunctiva/sclera: Conjunctivae normal    Pulmonary:      Effort: Pulmonary effort is normal    Musculoskeletal:         General: Tenderness (left shoulder arthralgia ) present  Cervical back: Neck supple  Skin:     General: Skin is warm and dry  Neurological:      Mental Status: He is alert and oriented to person, place, and time  Deep Tendon Reflexes: Reflexes are normal and symmetric  Psychiatric:         Mood and Affect: Mood normal          Behavior: Behavior normal        Left Shoulder Exam     Tenderness   The patient is experiencing no tenderness  Range of Motion   Active abduction: normal   External rotation: normal   Forward flexion: normal   Internal rotation 0 degrees: normal   Internal rotation 90 degrees: normal     Muscle Strength   The patient has normal left shoulder strength  Tests   Cross arm: negative  Impingement: negative  Drop arm: negative    Other   Erythema: absent  Scars: absent  Sensation: normal  Pulse: present     Comments:  Negative speeds and frey             I have personally reviewed pertinent films in PACS and my interpretation is mild degenerative changes, mild spurring AC joint, downsloping acromian  Type 2 acromion process       Scribe Attestation    I,:  Kylee Alexis am acting as a scribe while in the presence of the attending physician :       I,:  Tameka Garber MD personally performed the services described in this documentation    as scribed in my presence :

## 2022-03-22 ENCOUNTER — OFFICE VISIT (OUTPATIENT)
Dept: PHYSICAL THERAPY | Facility: CLINIC | Age: 62
End: 2022-03-22
Payer: COMMERCIAL

## 2022-03-22 DIAGNOSIS — M75.42 IMPINGEMENT SYNDROME OF LEFT SHOULDER: ICD-10-CM

## 2022-03-22 DIAGNOSIS — M25.512 ACUTE PAIN OF LEFT SHOULDER: Primary | ICD-10-CM

## 2022-03-22 DIAGNOSIS — M75.82 TENDINITIS OF LEFT ROTATOR CUFF: ICD-10-CM

## 2022-03-22 PROCEDURE — 97530 THERAPEUTIC ACTIVITIES: CPT

## 2022-03-22 PROCEDURE — 97110 THERAPEUTIC EXERCISES: CPT

## 2022-03-22 PROCEDURE — 97112 NEUROMUSCULAR REEDUCATION: CPT

## 2022-03-22 PROCEDURE — 97140 MANUAL THERAPY 1/> REGIONS: CPT

## 2022-03-22 NOTE — PROGRESS NOTES
Daily Note     Today's date: 3/22/2022  Patient name: Sonia Mckeon   :   MRN: [id number]  Referring provider: CHANELLE Lemons*  Dx:   Encounter Diagnosis     ICD-10-CM    1  Acute pain of left shoulder  M25 512    2  Tendinitis of left rotator cuff  M75 82    3  Impingement syndrome of left shoulder  M75 42                   Subjective: Pt reports his shoulder has been doing really well  Objective: See treatment diary below      Assessment: Pt did well with all Te as listed reports no increased pain during or post tx  Plan: Continue per plan of care        Precautions: None      Manuals 2/24 3/1 3/3 3/8 3/10 3/15 3/22     IASTM proximal bicep  transverse fx DL transverse fx AFB         Shoulder joint mob  DL inf Gr II-III  ATS with STM        Laser L shoulder            LEFT PROM HA  AFB  MM w/ STM HA HA     Neuro Re-Ed                                                                                                Ther Ex            Row Black 2x10 CC 50# 3x10 w/ hold CC 50# 3x10 w/ hold CC 50# 3x10 CC 50# 3x10 CC 50# 3x10 CC 50# 3x10     Shoulder Ext Black 2x10 Black 3x10 Black 3x10 Black 3x10 Black 3x10 Black 3x10 black 3x10     Shoulder ER Grn x20 Grn x20 Grn x20 Grn x20 Dbl Grn x20 Dbl grn x30 Dbl grn x30     Shoulder IR grn x20 Double Red x20 Double Red x30 Double R x30 Double R x30 double grn x30 double blue x30     Bent over Row 8# x20 8# x20 8# x30 PAIN        Bicep Curl 8# x20 8# 2x10  8# 2x10 10# 3x10 10# 3x10 10# 3x10 10# 3x10     Prone Scapular Retraction 2x10   20x 20x 20x 20x     Prone Shoulder Extension 3" 2x10   5x10 s 3" 2c10 3"2x10 3" 2x10     tricep ext CC 50# 2x10 CC L arm 30# 3x10 CC L arm 30# 3x10 40# 3x10 40# 3x10 40# 3x10 40# 3x10     B/L ER GTB 5" x20  GTB 5" x20 GTB 5" x20 GTB 5" 2x10 GTB 5" 2x10 GTB 5" 2x10     UBE 3'/3' 3'/3' 3'/3' 3'/3' 3'/3' 3'/3 3'/3'     Doorway Str    10x10' hold 10x10" hold 10"x10 10"x10     Ther Activity                        Gait Training                                    Modalities

## 2022-03-25 ENCOUNTER — OFFICE VISIT (OUTPATIENT)
Dept: PHYSICAL THERAPY | Facility: CLINIC | Age: 62
End: 2022-03-25
Payer: COMMERCIAL

## 2022-03-25 DIAGNOSIS — M75.82 TENDINITIS OF LEFT ROTATOR CUFF: ICD-10-CM

## 2022-03-25 DIAGNOSIS — M75.42 IMPINGEMENT SYNDROME OF LEFT SHOULDER: ICD-10-CM

## 2022-03-25 DIAGNOSIS — M25.512 ACUTE PAIN OF LEFT SHOULDER: Primary | ICD-10-CM

## 2022-03-25 PROCEDURE — 97140 MANUAL THERAPY 1/> REGIONS: CPT

## 2022-03-25 PROCEDURE — 97112 NEUROMUSCULAR REEDUCATION: CPT

## 2022-03-25 PROCEDURE — 97110 THERAPEUTIC EXERCISES: CPT

## 2022-03-25 NOTE — PROGRESS NOTES
Daily Note     Today's date: 3/25/2022  Patient name: Jeremy Sandhoff  : [birth year]  MRN: [id number]  Referring provider: CHANELLE Louise*  Dx:   Encounter Diagnosis     ICD-10-CM    1  Acute pain of left shoulder  M25 512    2  Tendinitis of left rotator cuff  M75 82    3  Impingement syndrome of left shoulder  M75 42                   Subjective: Pt reports his L shoulder is feeling better      Objective: See treatment diary below      Assessment: Performed exercise program w/o complaint  PROM to L shoulder w/o complaint  Will monitor  Pt would benefit from cont PT  Plan: Cont per POC       Precautions: None      Manuals 2/24 3/1 3/3 3/8 3/10 3/15 3/22 3/25    IASTM proximal bicep  transverse fx DL transverse fx AFB         Shoulder joint mob  DL inf Gr II-III  ATS with STM        Laser L shoulder            LEFT PROM HA  AFB  MM w/ STM HA HA MO    Neuro Re-Ed                                                                                                Ther Ex            Row Black 2x10 CC 50# 3x10 w/ hold CC 50# 3x10 w/ hold CC 50# 3x10 CC 50# 3x10 CC 50# 3x10 CC 50# 3x10 CC 50#  2x15    Shoulder Ext Black 2x10 Black 3x10 Black 3x10 Black 3x10 Black 3x10 Black 3x10 black 3x10 Black  2x15    Shoulder ER Grn x20 Grn x20 Grn x20 Grn x20 Dbl Grn x20 Dbl grn x30 Dbl grn x30 Dbl grn  x30    Shoulder IR grn x20 Double Red x20 Double Red x30 Double R x30 Double R x30 double grn x30 double blue x30 dble blue x30    Bent over Row 8# x20 8# x20 8# x30 PAIN        Bicep Curl 8# x20 8# 2x10  8# 2x10 10# 3x10 10# 3x10 10# 3x10 10# 3x10 10# 3x10    Prone Scapular Retraction 2x10   20x 20x 20x 20x NV    Prone Shoulder Extension 3" 2x10   5x10 s 3" 2c10 3"2x10 3" 2x10 3" 2x10    tricep ext CC 50# 2x10 CC L arm 30# 3x10 CC L arm 30# 3x10 40# 3x10 40# 3x10 40# 3x10 40# 3x10 40# 2x15    B/L ER GTB 5" x20  GTB 5" x20 GTB 5" x20 GTB 5" 2x10 GTB 5" 2x10 GTB 5" 2x10 GTB 5" 2x10    UBE 3'/3' 3'/3' 3'/3' 3'/3' 3'/3' 3'/3 3'/3' 3'/3'    Doorway Str    10x10' hold 10x10" hold 10"x10 10"x10 10"x10    Ther Activity                        Gait Training                                    Modalities

## 2022-03-28 ENCOUNTER — TELEPHONE (OUTPATIENT)
Dept: PODIATRY | Facility: CLINIC | Age: 62
End: 2022-03-28

## 2022-04-01 ENCOUNTER — OFFICE VISIT (OUTPATIENT)
Dept: PHYSICAL THERAPY | Facility: CLINIC | Age: 62
End: 2022-04-01
Payer: COMMERCIAL

## 2022-04-01 DIAGNOSIS — M75.42 IMPINGEMENT SYNDROME OF LEFT SHOULDER: ICD-10-CM

## 2022-04-01 DIAGNOSIS — M75.82 TENDINITIS OF LEFT ROTATOR CUFF: ICD-10-CM

## 2022-04-01 DIAGNOSIS — M25.512 ACUTE PAIN OF LEFT SHOULDER: Primary | ICD-10-CM

## 2022-04-01 PROCEDURE — 97140 MANUAL THERAPY 1/> REGIONS: CPT

## 2022-04-01 PROCEDURE — 97110 THERAPEUTIC EXERCISES: CPT

## 2022-04-01 NOTE — PROGRESS NOTES
Daily Note     Today's date: 2022  Patient name: Erasmo Valencia  :   MRN: [id number]  Referring provider: CHANELLE Sheikh*  Dx:   Encounter Diagnosis     ICD-10-CM    1  Acute pain of left shoulder  M25 512    2  Tendinitis of left rotator cuff  M75 82    3  Impingement syndrome of left shoulder  M75 42        Start Time: 0818  Stop Time: 09  Total time in clinic (min): 42 minutes       Subjective: Patient reports his left shoulder is feeling much better when reaching overhead  Objective: See treatment diary below  Assessment: Fatigue noted upon completion of therapeutic exercise program   Left shoulder PROM is tolerated well  Pending continued progress, patient may be ready for discharge from outpatient PT next week  Plan: Continue treatment as per PT plan of care         Precautions: none      Manuals 2/24 3/1 3/3 3/8 3/10 3/15 3/22 3/25 4/1   IASTM proximal bicep  transverse fx DL transverse fx AFB         Shoulder joint mob  DL inf Gr II-III  ATS with STM        Laser L shoulder            LEFT PROM HA  AFB  MM w/ STM HA BERUMEN MO JLW               Neuro Re-Ed                                                                                    Ther Ex            Row Black 2x10 CC 50# 3x10 w/ hold CC 50# 3x10 w/ hold CC 50# 3x10 CC 50# 3x10 CC 50# 3x10 CC 50# 3x10 CC 50#  2x15 CC 50#  2x15   Shoulder Ext Black 2x10 Black 3x10 Black 3x10 Black 3x10 Black 3x10 Black 3x10 black 3x10 Black  2x15 black  2x15   Shoulder ER Grn x20 Grn x20 Grn x20 Grn x20 Dbl Grn x20 Dbl grn x30 Dbl grn x30 Dbl grn  x30 dbl  green  30   Shoulder IR grn x20 Double Red x20 Double Red x30 Double R x30 Double R x30 double grn x30 double blue x30 dble blue x30 dble blue 30   Bent over Row 8# x20 8# x20 8# x30 PAIN        Bicep Curl 8# x20 8# 2x10  8# 2x10 10# 3x10 10# 3x10 10# 3x10 10# 3x10 10# 3x10 12#  3x10   Prone Scapular Retraction 2x10   20x 20x 20x 20x NV    Prone Shoulder Extension 3" 2x10   5x10 s 3" 2c10 3"2x10 3" 2x10 3" 2x10 3"x30   tricep ext CC 50# 2x10 CC L arm 30# 3x10 CC L arm 30# 3x10 40# 3x10 40# 3x10 40# 3x10 40# 3x10 40# 2x15 50#  2x15   B/L ER GTB 5" x20  GTB 5" x20 GTB 5" x20 GTB 5" 2x10 GTB 5" 2x10 GTB 5" 2x10 GTB 5" 2x10 blue  30   UBE 3'/3' 3'/3' 3'/3' 3'/3' 3'/3' 3'/3 3'/3' 3'/3' 3'/3'  lvl 3 5   Doorway Str    10x10' hold 10x10" hold 10"x10 10"x10 10"x10  30"x4               Ther Activity                        Gait Training                                    Modalities

## 2022-04-05 ENCOUNTER — OFFICE VISIT (OUTPATIENT)
Dept: PHYSICAL THERAPY | Facility: CLINIC | Age: 62
End: 2022-04-05
Payer: COMMERCIAL

## 2022-04-05 DIAGNOSIS — M25.512 ACUTE PAIN OF LEFT SHOULDER: Primary | ICD-10-CM

## 2022-04-05 DIAGNOSIS — M75.42 IMPINGEMENT SYNDROME OF LEFT SHOULDER: ICD-10-CM

## 2022-04-05 DIAGNOSIS — M75.82 TENDINITIS OF LEFT ROTATOR CUFF: ICD-10-CM

## 2022-04-05 PROCEDURE — 97530 THERAPEUTIC ACTIVITIES: CPT

## 2022-04-05 PROCEDURE — 97140 MANUAL THERAPY 1/> REGIONS: CPT

## 2022-04-05 PROCEDURE — 97110 THERAPEUTIC EXERCISES: CPT

## 2022-04-05 NOTE — PROGRESS NOTES
Daily Note     Today's date: 2022  Patient name: Zuri Munson  : [birth year]  MRN: [id number]  Referring provider: CHANELLE Olivares*  Dx:   Encounter Diagnosis     ICD-10-CM    1  Acute pain of left shoulder  M25 512    2  Tendinitis of left rotator cuff  M75 82    3  Impingement syndrome of left shoulder  M75 42                   Subjective: Pt reports his shoulder is feeling a lot better only has slight pain with full extension  Objective: See treatment diary below      Assessment: Tolerated treatment well  Patient reports no pain with IR w/ TB this session  Plan: Continue per plan of care        Precautions: none      Manuals 3/8 3/10 3/15 3/22 3/25 4/1 4/5     IASTM proximal bicep            Shoulder joint mob ATS with STM           Laser L shoulder            LEFT PROM  MM w/ STM HA BERUMEN MO JLW BERUMEN                 Neuro Re-Ed                                                                                    Ther Ex            Row CC 50# 3x10 CC 50# 3x10 CC 50# 3x10 CC 50# 3x10 CC 50#  2x15 CC 50#  2x15 CC 60# 3x10     Shoulder Ext Black 3x10 Black 3x10 Black 3x10 black 3x10 Black  2x15 black  2x15 Black 2x15     Shoulder ER Grn x20 Dbl Grn x20 Dbl grn x30 Dbl grn x30 Dbl grn  x30 dbl  green  30 Dbl green 30     Shoulder IR Double R x30 Double R x30 double grn x30 double blue x30 dble blue x30 dble blue 30 bdl blue 30     Bent over Row PAIN           Bicep Curl 10# 3x10 10# 3x10 10# 3x10 10# 3x10 10# 3x10 12#  3x10 CC 40# 3x10     Prone Scapular Retraction 20x 20x 20x 20x NV       Prone Shoulder Extension 5x10 s 3" 2c10 3"2x10 3" 2x10 3" 2x10 3"x30 3"x30     tricep ext 40# 3x10 40# 3x10 40# 3x10 40# 3x10 40# 2x15 50#  2x15 50# 2x15     B/L ER GTB 5" x20 GTB 5" 2x10 GTB 5" 2x10 GTB 5" 2x10 GTB 5" 2x10 blue  30 Blue 30     UBE 3'/3' 3'/3' 3'/3 3'/3' 3'/3' 3'/3'  lvl 3 5 3'/3' lvl 3 5     Doorway Str 10x10' hold 10x10" hold 10"x10 10"x10 10"x10  30"x4 30"x4                 Ther Activity Gait Training                                    Modalities

## 2022-04-06 ENCOUNTER — APPOINTMENT (OUTPATIENT)
Dept: LAB | Facility: CLINIC | Age: 62
End: 2022-04-06
Payer: COMMERCIAL

## 2022-04-06 DIAGNOSIS — I10 ESSENTIAL HYPERTENSION: ICD-10-CM

## 2022-04-06 DIAGNOSIS — Z13.1 SCREENING FOR DIABETES MELLITUS: ICD-10-CM

## 2022-04-06 DIAGNOSIS — Z13.6 SCREENING FOR CARDIOVASCULAR CONDITION: ICD-10-CM

## 2022-04-06 LAB
ALBUMIN SERPL BCP-MCNC: 4.1 G/DL (ref 3.5–5)
ALP SERPL-CCNC: 71 U/L (ref 46–116)
ALT SERPL W P-5'-P-CCNC: 59 U/L (ref 12–78)
ANION GAP SERPL CALCULATED.3IONS-SCNC: 4 MMOL/L (ref 4–13)
AST SERPL W P-5'-P-CCNC: 35 U/L (ref 5–45)
BILIRUB SERPL-MCNC: 1.71 MG/DL (ref 0.2–1)
BUN SERPL-MCNC: 31 MG/DL (ref 5–25)
CALCIUM SERPL-MCNC: 9.5 MG/DL (ref 8.3–10.1)
CHLORIDE SERPL-SCNC: 106 MMOL/L (ref 100–108)
CHOLEST SERPL-MCNC: 179 MG/DL
CO2 SERPL-SCNC: 29 MMOL/L (ref 21–32)
CREAT SERPL-MCNC: 1.09 MG/DL (ref 0.6–1.3)
EST. AVERAGE GLUCOSE BLD GHB EST-MCNC: 117 MG/DL
GFR SERPL CREATININE-BSD FRML MDRD: 71 ML/MIN/1.73SQ M
GLUCOSE P FAST SERPL-MCNC: 103 MG/DL (ref 65–99)
HBA1C MFR BLD: 5.7 %
HDLC SERPL-MCNC: 58 MG/DL
LDLC SERPL CALC-MCNC: 104 MG/DL (ref 0–100)
POTASSIUM SERPL-SCNC: 4.2 MMOL/L (ref 3.5–5.3)
PROT SERPL-MCNC: 7.6 G/DL (ref 6.4–8.2)
SODIUM SERPL-SCNC: 139 MMOL/L (ref 136–145)
TRIGL SERPL-MCNC: 83 MG/DL

## 2022-04-06 PROCEDURE — 83036 HEMOGLOBIN GLYCOSYLATED A1C: CPT

## 2022-04-06 PROCEDURE — 80061 LIPID PANEL: CPT

## 2022-04-06 PROCEDURE — 80053 COMPREHEN METABOLIC PANEL: CPT

## 2022-04-06 PROCEDURE — 36415 COLL VENOUS BLD VENIPUNCTURE: CPT

## 2022-04-07 ENCOUNTER — EVALUATION (OUTPATIENT)
Dept: PHYSICAL THERAPY | Facility: CLINIC | Age: 62
End: 2022-04-07
Payer: COMMERCIAL

## 2022-04-07 DIAGNOSIS — M25.512 ACUTE PAIN OF LEFT SHOULDER: Primary | ICD-10-CM

## 2022-04-07 PROCEDURE — 97110 THERAPEUTIC EXERCISES: CPT | Performed by: PHYSICAL THERAPIST

## 2022-04-07 PROCEDURE — 97140 MANUAL THERAPY 1/> REGIONS: CPT | Performed by: PHYSICAL THERAPIST

## 2022-04-07 NOTE — PROGRESS NOTES
Daily Note /DC summary    Today's date: 2022  Patient name: Grecia Chen  :   MRN: [id number]  Referring provider: CHANELLE Walker*  Dx:   Encounter Diagnosis     ICD-10-CM    1  Acute pain of left shoulder  M25 512                   Subjective: pt reports that he is pleased with his progress and feels that he will be able to continue with his hep indep  Objective: See treatment diary below  AROM:   Flexion: 160   Abduction: 160   Slight pain with end range flexion and abduction    PROM:   Flexion: 165   Abduction: 65   ER:  70   IR: 52    Strength:    Flexion:  4+/5   Abduction: 4+  /5   ER:    4/5, slight pain produced   IR:     5-/5          Assessment: pt has achieved a majority of his goals set at the start of PT and demonstrates independent with his current hep       Plan: DC to hep     Precautions: none      Manuals 3/8 3/10 3/15 3/22 3/25 4/1 4/5 4/7    IASTM proximal bicep            Shoulder joint mob ATS with STM           Laser L shoulder            LEFT PROM  MM w/ STM BERUMEN BERUMEN MO JLW BERUMEN kl                Neuro Re-Ed                                                                                    Ther Ex            Row CC 50# 3x10 CC 50# 3x10 CC 50# 3x10 CC 50# 3x10 CC 50#  2x15 CC 50#  2x15 CC 60# 3x10 60#x30    Shoulder Ext Black 3x10 Black 3x10 Black 3x10 black 3x10 Black  2x15 black  2x15 Black 2x15 Black x30    Shoulder ER Grn x20 Dbl Grn x20 Dbl grn x30 Dbl grn x30 Dbl grn  x30 dbl  green  30 Dbl green 30 Dbl green x30    Shoulder IR Double R x30 Double R x30 double grn x30 double blue x30 dble blue x30 dble blue 30 bdl blue 30 Dbl blue x30    Bent over Row PAIN           Bicep Curl 10# 3x10 10# 3x10 10# 3x10 10# 3x10 10# 3x10 12#  3x10 CC 40# 3x10 40#x30    Prone Scapular Retraction 20x 20x 20x 20x NV       Prone Shoulder Extension 5x10 s 3" 2c10 3"2x10 3" 2x10 3" 2x10 3"x30 3"x30     tricep ext 40# 3x10 40# 3x10 40# 3x10 40# 3x10 40# 2x15 50#  2x15 50# 2x15 60#x30 B/L ER GTB 5" x20 GTB 5" 2x10 GTB 5" 2x10 GTB 5" 2x10 GTB 5" 2x10 blue  30 Blue 30 Blue x30    UBE 3'/3' 3'/3' 3'/3 3'/3' 3'/3' 3'/3'  lvl 3 5 3'/3' lvl 3 5 3'/3'    Doorway Str 10x10' hold 10x10" hold 10"x10 10"x10 10"x10  30"x4 30"x4 30"x4                Ther Activity                        Gait Training                                    Modalities

## 2022-04-08 ENCOUNTER — RA CDI HCC (OUTPATIENT)
Dept: OTHER | Facility: HOSPITAL | Age: 62
End: 2022-04-08

## 2022-04-08 NOTE — PROGRESS NOTES
[facility]  coding opportunities       Chart reviewed, no opportunity found: CHART REVIEWED, NO OPPORTUNITY FOUND        Patients Insurance        Commercial Insurance: [address] [place] [address]

## 2022-04-15 ENCOUNTER — OFFICE VISIT (OUTPATIENT)
Dept: INTERNAL MEDICINE CLINIC | Facility: CLINIC | Age: 62
End: 2022-04-15
Payer: COMMERCIAL

## 2022-04-15 VITALS
SYSTOLIC BLOOD PRESSURE: 134 MMHG | WEIGHT: 233.8 LBS | HEIGHT: 76 IN | OXYGEN SATURATION: 96 % | RESPIRATION RATE: 16 BRPM | DIASTOLIC BLOOD PRESSURE: 82 MMHG | BODY MASS INDEX: 28.47 KG/M2 | TEMPERATURE: 97.4 F | HEART RATE: 85 BPM

## 2022-04-15 DIAGNOSIS — E78.5 HYPERLIPIDEMIA, UNSPECIFIED HYPERLIPIDEMIA TYPE: ICD-10-CM

## 2022-04-15 DIAGNOSIS — F41.9 ANXIETY: ICD-10-CM

## 2022-04-15 DIAGNOSIS — I10 ESSENTIAL (PRIMARY) HYPERTENSION: ICD-10-CM

## 2022-04-15 DIAGNOSIS — R73.03 PREDIABETES: ICD-10-CM

## 2022-04-15 DIAGNOSIS — Z11.4 SCREENING FOR HIV (HUMAN IMMUNODEFICIENCY VIRUS): Primary | ICD-10-CM

## 2022-04-15 DIAGNOSIS — C43.9 MALIGNANT MELANOMA OF SKIN (HCC): ICD-10-CM

## 2022-04-15 PROCEDURE — 99214 OFFICE O/P EST MOD 30 MIN: CPT | Performed by: INTERNAL MEDICINE

## 2022-04-15 PROCEDURE — 3008F BODY MASS INDEX DOCD: CPT | Performed by: INTERNAL MEDICINE

## 2022-04-15 PROCEDURE — 1036F TOBACCO NON-USER: CPT | Performed by: INTERNAL MEDICINE

## 2022-04-15 RX ORDER — ROSUVASTATIN CALCIUM 10 MG/1
10 TABLET, COATED ORAL DAILY
Qty: 90 TABLET | Refills: 3
Start: 2022-04-15 | End: 2022-07-25 | Stop reason: SDUPTHER

## 2022-04-16 PROBLEM — R73.03 PREDIABETES: Status: ACTIVE | Noted: 2022-04-16

## 2022-04-17 NOTE — PROGRESS NOTES
Assessment/Plan:    Essential (primary) hypertension  Hypertension - controlled, I have counseled patient following healthy balance diet, I would like the patient reduce sodium, exercise routinely, I would like the patient continued the med current medical regiment and we will continue to monitor  Blood pressure today 134/82; continue amlodipine 5 mg once daily    Malignant melanoma of skin (HCC)  Currently stable doing well continue sunscreen continue routine follow-up with Dermatology    Hyperlipidemia  Hyperlipidemia mildly elevated will increase Crestor to 10 mg once daily recommend low-cholesterol diet continue monitor lipid profile comprehensive metabolic panel    Anxiety  Clinically stable and doing well continue the current medical regiment will continue monitor  Prediabetes  Pre diabetes -I have counseled the patient to follow a healthy balanced diet, I have counseled patient reduce carbohydrates and sweets in the diet, I would like the patient exercise routinely  I will be checking hemoglobin A1c and comprehensive metabolic panel  Have counseled patient about the prevention of diabetes, and the risk of progression to type 2 diabetes  Problem List Items Addressed This Visit        Cardiovascular and Mediastinum    Essential (primary) hypertension     Hypertension - controlled, I have counseled patient following healthy balance diet, I would like the patient reduce sodium, exercise routinely, I would like the patient continued the med current medical regiment and we will continue to monitor  Blood pressure today 134/82; continue amlodipine 5 mg once daily            Musculoskeletal and Integument    Malignant melanoma of skin (HCC)     Currently stable doing well continue sunscreen continue routine follow-up with Dermatology            Other    Anxiety     Clinically stable and doing well continue the current medical regiment will continue monitor           Hyperlipidemia     Hyperlipidemia mildly elevated will increase Crestor to 10 mg once daily recommend low-cholesterol diet continue monitor lipid profile comprehensive metabolic panel         Relevant Medications    rosuvastatin (CRESTOR) 10 MG tablet    Other Relevant Orders    Comprehensive metabolic panel    Lipid Panel with Direct LDL reflex    Prediabetes     Pre diabetes -I have counseled the patient to follow a healthy balanced diet, I have counseled patient reduce carbohydrates and sweets in the diet, I would like the patient exercise routinely  I will be checking hemoglobin A1c and comprehensive metabolic panel  Have counseled patient about the prevention of diabetes, and the risk of progression to type 2 diabetes  Relevant Orders    Hemoglobin A1C      Other Visit Diagnoses     Screening for HIV (human immunodeficiency virus)    -  Primary          RTO in 6 months call if any problems  Subjective:      Patient ID: Chelsie Deutsch is a 59 y o  male  HPI 62-year old male coming in for a follow up visit regarding hyperlipidemia, prediabetes, hypertension, melanoma and anxiety; The patient reports me compliant taking medications without untoward side effects the  The patient is here to review his medical condition, update me on the medical condition and the patient reports me no hospitalizations and no ER visits  Trying to follow healthy/balance diet remains active no injuries no illnesses no overall doing very well here to review his laboratories  The following portions of the patient's history were reviewed and updated as appropriate: allergies, current medications, past family history, past medical history, past social history, past surgical history and problem list     Review of Systems   Constitutional: Negative for activity change, appetite change and unexpected weight change  HENT: Negative for congestion  Eyes: Negative for visual disturbance  Respiratory: Negative for cough and shortness of breath  Cardiovascular: Negative for chest pain  Gastrointestinal: Negative for abdominal pain, diarrhea, nausea and vomiting  Neurological: Negative for dizziness, light-headedness and headaches  Hematological: Negative for adenopathy  Objective:    No follow-ups on file  No results found  Allergies   Allergen Reactions    Bee Venom Edema    Celecoxib Hives     Reaction Date: 21Nov2011; Category: Allergy; Past Medical History:   Diagnosis Date    [facility] ([facility] )     Hyperlipidemia     Malignant melanoma of skin ([facility] )     last assessed 10/25/17, resolved 4/20/15     Past Surgical History:   Procedure Laterality Date    COLONOSCOPY  2012    Dr Eduard Livingston   Impression 1/12/15    HEMORROIDECTOMY      SKIN LESION EXCISION Right     Elbow     TONSILECTOMY AND ADNOIDECTOMY       Current Outpatient Medications on File Prior to Visit   Medication Sig Dispense Refill    amLODIPine (NORVASC) 5 mg tablet TAKE 2 TABLETS BY MOUTH IN THE MORNING 180 tablet 1    Ascorbic Acid (VITAMIN C) 1000 MG tablet Take 1 tablet by mouth daily      escitalopram (LEXAPRO) 20 mg tablet TAKE 1 TABLET BY MOUTH EVERY DAY 90 tablet 1    EPINEPHrine (EpiPen 2-Sherman) 0 3 mg/0 3 mL SOAJ Inject 0 3 mL (0 3 mg total) into a muscle once for 1 dose (Patient not taking: Reported on 4/15/2022 ) 1 each 1     No current facility-administered medications on file prior to visit       Family History   Problem Relation Age of Onset    Diabetes Mother     Skin cancer Mother     Coronary artery disease Father      Social History     Socioeconomic History    Marital status: /Civil Union     Spouse name: Not on file    Number of children: Not on file    Years of education: Not on file    Highest education level: Not on file   Occupational History    Not on file   Tobacco Use    Smoking status: Never Smoker    Smokeless tobacco: Never Used   Vaping Use    Vaping Use: Never used   Substance and Sexual Activity    Alcohol use: Yes     Comment: Social     Drug use: No    Sexual activity: Yes   Other Topics Concern    Not on file   Social History Narrative    Not on file     Social Determinants of Health     Financial Resource Strain: Not on file   Food Insecurity: Not on file   Transportation Needs: Not on file   Physical Activity: Not on file   Stress: Not on file   Social Connections: Not on file   Intimate Partner Violence: Not on file   Housing Stability: Not on file     Vitals:    04/15/22 0832   BP: 134/82   Pulse: 85   Resp: 16   Temp: (!) 97 4 °F (36 3 °C)   SpO2: 96%   Weight: 106 kg (233 lb 12 8 oz)   Height: 6' 4" (1 93 m)     Results for orders placed or performed in visit on 04/06/22   Comprehensive metabolic panel   Result Value Ref Range    Sodium 139 136 - 145 mmol/L    Potassium 4 2 3 5 - 5 3 mmol/L    Chloride 106 100 - 108 mmol/L    CO2 29 21 - 32 mmol/L    ANION GAP 4 4 - 13 mmol/L    BUN 31 (H) 5 - 25 mg/dL    Creatinine 1 09 0 60 - 1 30 mg/dL    Glucose, Fasting 103 (H) 65 - 99 mg/dL    Calcium 9 5 8 3 - 10 1 mg/dL    AST 35 5 - 45 U/L    ALT 59 12 - 78 U/L    Alkaline Phosphatase 71 46 - 116 U/L    Total Protein 7 6 6 4 - 8 2 g/dL    Albumin 4 1 3 5 - 5 0 g/dL    Total Bilirubin 1 71 (H) 0 20 - 1 00 mg/dL    eGFR 71 ml/min/1 73sq m   Hemoglobin A1C   Result Value Ref Range    Hemoglobin A1C 5 7 (H) Normal 3 8-5 6%; PreDiabetic 5 7-6 4%; Diabetic >=6 5%; Glycemic control for adults with diabetes <7 0% %     mg/dl   Lipid Panel with Direct LDL reflex   Result Value Ref Range    Cholesterol 179 See Comment mg/dL    Triglycerides 83 See Comment mg/dL    HDL, Direct 58 >=40 mg/dL    LDL Calculated 104 (H) 0 - 100 mg/dL     Weight (last 2 days)     Date/Time Weight    04/15/22 0832 106 (233 8)        Body mass index is 28 46 kg/m²    BP      Temp     Pulse     Resp      SpO2        Vitals:    04/15/22 0832   Weight: 106 kg (233 lb 12 8 oz)     Vitals:    04/15/22 0832   Weight: 106 kg (233 lb 12 8 oz)       BP 134/82   Pulse 85   Temp (!) 97 4 °F (36 3 °C)   Resp 16   Ht 6' 4" (1 93 m)   Wt 106 kg (233 lb 12 8 oz)   SpO2 96%   BMI 28 46 kg/m²          Physical Exam  Constitutional:       General: He is not in acute distress  Appearance: He is well-developed  He is not diaphoretic  HENT:      Head: Normocephalic and atraumatic  Right Ear: External ear normal       Left Ear: External ear normal    Eyes:      General: No scleral icterus  Right eye: No discharge  Left eye: No discharge  Conjunctiva/sclera: Conjunctivae normal       Pupils: Pupils are equal, round, and reactive to light  Cardiovascular:      Rate and Rhythm: Normal rate and regular rhythm  Heart sounds: Normal heart sounds  No murmur heard  No friction rub  No gallop  Pulmonary:      Effort: No respiratory distress  Breath sounds: No wheezing or rales  Abdominal:      General: Bowel sounds are normal  There is no distension  Palpations: Abdomen is soft  There is no mass  Tenderness: There is no abdominal tenderness  There is no guarding or rebound  Musculoskeletal:         General: No deformity  Cervical back: Neck supple  Lymphadenopathy:      Cervical: No cervical adenopathy  Neurological:      Mental Status: He is alert  Psychiatric:         Mood and Affect: Mood is not anxious or depressed

## 2022-04-17 NOTE — ASSESSMENT & PLAN NOTE
Hyperlipidemia mildly elevated will increase Crestor to 10 mg once daily recommend low-cholesterol diet continue monitor lipid profile comprehensive metabolic panel

## 2022-04-17 NOTE — ASSESSMENT & PLAN NOTE
Hypertension - controlled, I have counseled patient following healthy balance diet, I would like the patient reduce sodium, exercise routinely, I would like the patient continued the med current medical regiment and we will continue to monitor    Blood pressure today 134/82; continue amlodipine 5 mg once daily

## 2022-07-11 ENCOUNTER — OFFICE VISIT (OUTPATIENT)
Dept: OBGYN CLINIC | Facility: OTHER | Age: 62
End: 2022-07-11
Payer: COMMERCIAL

## 2022-07-11 VITALS
BODY MASS INDEX: 28.37 KG/M2 | HEIGHT: 76 IN | WEIGHT: 233 LBS | SYSTOLIC BLOOD PRESSURE: 134 MMHG | HEART RATE: 84 BPM | DIASTOLIC BLOOD PRESSURE: 81 MMHG

## 2022-07-11 DIAGNOSIS — M75.82 TENDINITIS OF LEFT ROTATOR CUFF: ICD-10-CM

## 2022-07-11 DIAGNOSIS — M24.812 INTERNAL DERANGEMENT OF SHOULDER, LEFT: Primary | ICD-10-CM

## 2022-07-11 PROCEDURE — 99214 OFFICE O/P EST MOD 30 MIN: CPT | Performed by: ORTHOPAEDIC SURGERY

## 2022-07-11 PROCEDURE — 99204 OFFICE O/P NEW MOD 45 MIN: CPT | Performed by: ORTHOPAEDIC SURGERY

## 2022-07-11 NOTE — PROGRESS NOTES
Assessment    Internal derangement left shoulder     Discussion and Plan:    · The patient continues to have recurrent symptoms despite appropriate nonoperative care and is indicated for MRI scan of the left shoulder to evaluate for structural pathology which is precluding him from fully improving  · Continue with home exercises from physical therapy   · Follow up to discuss MRI left shoulder results     Subjective:   Patient ID: Mark Uribe is a 59 y o  male      HPI    59year old male presents today for left shoulder pain  He has been having left shoulder pain for years off and on  He was previously treating with Dr Cherlyn Severin was diagnosed with left rotator cuff tendinitis and impingement syndrome  He was referred to physical therapy and states he has significant improvement with physical therapy  He also had a left subacromial steroid injection on 02/17/2022 with no relief  States he is having pain lateral aspect the left shoulder into the biceps region  He denies any radiating arm pain  He is having pain over hip lifting and with extension  He is currently not taking pain medications  The following portions of the patient's history were reviewed and updated as appropriate: allergies, current medications, past family history, past medical history, past social history, past surgical history and problem list     Review of Systems   Constitutional: Negative for chills and fever  HENT: Negative for ear pain and sore throat  Eyes: Negative for pain and visual disturbance  Respiratory: Negative for cough and shortness of breath  Cardiovascular: Negative for chest pain and palpitations  Gastrointestinal: Negative for abdominal pain and vomiting  Genitourinary: Negative for dysuria and hematuria  Musculoskeletal: Positive for myalgias  Negative for arthralgias and back pain  Skin: Negative for color change and rash  Neurological: Negative for seizures and syncope     All other systems reviewed and are negative  Objective:  /81   Pulse 84   Ht 6' 4" (1 93 m)   Wt 106 kg (233 lb)   BMI 28 36 kg/m²       Left Shoulder Exam     Range of Motion   Active abduction: normal   External rotation: normal   Forward flexion: normal   Internal rotation 0 degrees: normal   Internal rotation 90 degrees: normal     Muscle Strength   Abduction: 3/5   Supraspinatus: 4/5   Subscapularis: 4/5     Other   Erythema: absent  Scars: absent             Physical Exam  Constitutional:       Appearance: He is well-developed  HENT:      Right Ear: External ear normal       Left Ear: External ear normal    Eyes:      Pupils: Pupils are equal, round, and reactive to light  Cardiovascular:      Rate and Rhythm: Normal rate and regular rhythm  Pulmonary:      Effort: Pulmonary effort is normal       Breath sounds: Normal breath sounds  Skin:     General: Skin is warm and dry  Neurological:      Mental Status: He is alert and oriented to person, place, and time  Psychiatric:         Behavior: Behavior normal          Thought Content:  Thought content normal        Scribe Attestation    I,:  Leon Nagel am acting as a scribe while in the presence of the attending physician :       I,:  Sue Deutsch MD personally performed the services described in this documentation    as scribed in my presence :

## 2022-07-22 DIAGNOSIS — F41.9 ANXIETY: ICD-10-CM

## 2022-07-22 RX ORDER — ESCITALOPRAM OXALATE 20 MG/1
TABLET ORAL
Qty: 90 TABLET | Refills: 1 | Status: SHIPPED | OUTPATIENT
Start: 2022-07-22

## 2022-07-25 DIAGNOSIS — E78.5 HYPERLIPIDEMIA, UNSPECIFIED HYPERLIPIDEMIA TYPE: ICD-10-CM

## 2022-07-25 RX ORDER — ROSUVASTATIN CALCIUM 10 MG/1
10 TABLET, COATED ORAL DAILY
Qty: 90 TABLET | Refills: 0 | Status: SHIPPED | OUTPATIENT
Start: 2022-07-25 | End: 2022-10-21

## 2022-07-25 NOTE — TELEPHONE ENCOUNTER
Medication Refill Request     Name rosuvastatin (CRESTOR) 10 MG tablet  Dose/Frequency Take 1 tablet (10 mg total) by mouth daily  Quantity 90 tablet  Verified pharmacy   [x]  Verified ordering Provider   [x]  Does patient have enough for the next 3 days?  Yes [] No [x]

## 2022-08-24 ENCOUNTER — HOSPITAL ENCOUNTER (OUTPATIENT)
Dept: MRI IMAGING | Facility: HOSPITAL | Age: 62
Discharge: HOME | End: 2022-08-24
Attending: ORTHOPAEDIC SURGERY
Payer: COMMERCIAL

## 2022-08-24 DIAGNOSIS — M24.812 INTERNAL DERANGEMENT OF SHOULDER, LEFT: ICD-10-CM

## 2022-08-24 PROCEDURE — G1004 CDSM NDSC: HCPCS

## 2022-08-24 PROCEDURE — 73221 MRI JOINT UPR EXTREM W/O DYE: CPT

## 2022-08-25 DIAGNOSIS — I10 ESSENTIAL HYPERTENSION: ICD-10-CM

## 2022-08-25 RX ORDER — AMLODIPINE BESYLATE 5 MG/1
TABLET ORAL
Qty: 180 TABLET | Refills: 1 | Status: SHIPPED | OUTPATIENT
Start: 2022-08-25

## 2022-08-29 ENCOUNTER — OFFICE VISIT (OUTPATIENT)
Dept: OBGYN CLINIC | Facility: OTHER | Age: 62
End: 2022-08-29
Payer: COMMERCIAL

## 2022-08-29 VITALS
SYSTOLIC BLOOD PRESSURE: 121 MMHG | DIASTOLIC BLOOD PRESSURE: 75 MMHG | BODY MASS INDEX: 28.65 KG/M2 | WEIGHT: 235.3 LBS | HEIGHT: 76 IN | HEART RATE: 71 BPM

## 2022-08-29 DIAGNOSIS — M24.112 LABRAL TEAR OF SHOULDER, DEGENERATIVE, LEFT: Primary | ICD-10-CM

## 2022-08-29 PROCEDURE — 99214 OFFICE O/P EST MOD 30 MIN: CPT | Performed by: ORTHOPAEDIC SURGERY

## 2022-08-29 NOTE — PROGRESS NOTES
Assessment  Diagnoses and all orders for this visit:    Labral tear of shoulder, degenerative, left  -     Ambulatory Referral to Physical Therapy; Future    Discussion and Plan:    Findings today are consistent with a degenerative labral tear to the left shoulder without evidence of structural damage to the rotator cuff that would require surgical intervention  Imaging and prognosis was reviewed with the patient today  Referral to physical therapy was placed today to continue working on range of motion and strengthening of the left shoulder as I do not recommend surgical treatment of degenerative labral tears  Patient can follow up as needed and he expressed understanding of the plan and was happy with the ability to avoid surgical intervention  Subjective:   Patient ID: aMrk Uribe is a 57 y o  male      HPI  The patient presents with a chief complaint of left shoulder pain  The pain began 3 year(s) ago and is not associated with an acute injury  Patient states pain has been present on and off the several years  The patient describes the pain as sharp in intensity,  intermittent in timing, and localizes the pain to the  left glenohumeral joint  The pain is worse with movement, overhead work and raising arm over head and relieved by rest, avoiding the painful activities  The pain is not associated with numbness and tingling  The pain is not associated with constitutional symptoms  The patient is not awoken at night by the pain  The patient has had tried physical therapy (significant relief) and subacromial steroid injection 2 17 22 (minimal relief) treatment  The following portions of the patient's history were reviewed and updated as appropriate: allergies, current medications, past family history, past medical history, past social history, past surgical history and problem list     Review of Systems   Constitutional: Negative for chills and fever     HENT: Negative for ear pain and sore throat  Eyes: Negative for pain and visual disturbance  Respiratory: Negative for cough and shortness of breath  Cardiovascular: Negative for chest pain and palpitations  Gastrointestinal: Negative for abdominal pain and vomiting  Genitourinary: Negative for dysuria and hematuria  Musculoskeletal: Positive for arthralgias (Left Shoulder)  Negative for back pain  Skin: Negative for color change and rash  Neurological: Negative for seizures and syncope  All other systems reviewed and are negative  Objective:  /75 (BP Location: Right arm, Patient Position: Sitting, Cuff Size: Standard)   Pulse 71   Ht 6' 4" (1 93 m)   Wt 107 kg (235 lb 4 8 oz)   BMI 28 64 kg/m²       Left Shoulder Exam     Range of Motion   Active abduction: normal   External rotation: normal   Forward flexion: normal   Internal rotation 0 degrees: normal   Internal rotation 90 degrees: normal     Muscle Strength   Supraspinatus: 4/5   Subscapularis: 4/5     Other   Erythema: absent  Scars: absent  Sensation: normal  Pulse: present             Physical Exam  Eyes:      Pupils: Pupils are equal, round, and reactive to light  Pulmonary:      Effort: Pulmonary effort is normal       Breath sounds: Normal breath sounds  Skin:     General: Skin is warm and dry  Neurological:      Mental Status: He is alert and oriented to person, place, and time  Psychiatric:         Behavior: Behavior normal          Thought Content: Thought content normal          Judgment: Judgment normal            I have personally reviewed pertinent films in PACS and my interpretation is as follows  MRI of left shoulder demonstrates a degenerative posterior labral tear, mild supraspinatus tendinosis and mild acromioclavicular osteoarthritis         Scribe Attestation    I,:  Read Vaz am acting as a scribe while in the presence of the attending physician :       I,:  Deepa Jacinto MD personally performed the services described in this documentation    as scribed in my presence :

## 2022-09-08 ENCOUNTER — APPOINTMENT (OUTPATIENT)
Dept: LAB | Facility: CLINIC | Age: 62
End: 2022-09-08
Payer: COMMERCIAL

## 2022-09-08 DIAGNOSIS — C61 PROSTATE CANCER (HCC): ICD-10-CM

## 2022-09-08 LAB — PSA SERPL-MCNC: 2.6 NG/ML (ref 0–4)

## 2022-09-08 PROCEDURE — 84153 ASSAY OF PSA TOTAL: CPT

## 2022-09-12 ENCOUNTER — OFFICE VISIT (OUTPATIENT)
Dept: UROLOGY | Facility: AMBULATORY SURGERY CENTER | Age: 62
End: 2022-09-12
Payer: COMMERCIAL

## 2022-09-12 VITALS
BODY MASS INDEX: 28.01 KG/M2 | HEIGHT: 76 IN | SYSTOLIC BLOOD PRESSURE: 122 MMHG | HEART RATE: 65 BPM | DIASTOLIC BLOOD PRESSURE: 74 MMHG | OXYGEN SATURATION: 98 % | WEIGHT: 230 LBS

## 2022-09-12 DIAGNOSIS — Z12.5 SCREENING FOR PROSTATE CANCER: ICD-10-CM

## 2022-09-12 DIAGNOSIS — R97.20 ELEVATED PSA: ICD-10-CM

## 2022-09-12 DIAGNOSIS — N40.0 BENIGN PROSTATIC HYPERPLASIA WITHOUT LOWER URINARY TRACT SYMPTOMS: Primary | ICD-10-CM

## 2022-09-12 LAB
SL AMB  POCT GLUCOSE, UA: NORMAL
SL AMB LEUKOCYTE ESTERASE,UA: NORMAL
SL AMB POCT BILIRUBIN,UA: NORMAL
SL AMB POCT BLOOD,UA: NORMAL
SL AMB POCT CLARITY,UA: CLEAR
SL AMB POCT COLOR,UA: YELLOW
SL AMB POCT KETONES,UA: NORMAL
SL AMB POCT NITRITE,UA: NORMAL
SL AMB POCT PH,UA: 6
SL AMB POCT SPECIFIC GRAVITY,UA: 1.01
SL AMB POCT URINE PROTEIN: NORMAL
SL AMB POCT UROBILINOGEN: 0.2

## 2022-09-12 PROCEDURE — 81002 URINALYSIS NONAUTO W/O SCOPE: CPT | Performed by: NURSE PRACTITIONER

## 2022-09-12 PROCEDURE — 99213 OFFICE O/P EST LOW 20 MIN: CPT | Performed by: NURSE PRACTITIONER

## 2022-09-12 NOTE — PROGRESS NOTES
9/12/2022    Mark Uribe  [birth year]  [id number]      Assessment  -Prostate cancer screening  -Elevated PSA s/p negative multiparametric MRI prostate (11/2021)    Discussion/Plan  Sara Velázquez is a 57 y o  male being managed by Dr Ole Whitmore  1  Prostate cancer screening, elevated PSA s/p negative multiparametric MRI prostate (11/2021)- we reviewed the results of his recent PSA which is 2 6, previously 2 7  He underwent prior multiparametric MRI of prostate which revealed PI-RADS 2  No significant findings noted on digital rectal examination today  We will continue annual prostate cancer screening  He has no urinary complaints at this time  Follow-up in 1 year with PSA and MICHELLE  Patient was advised to call sooner with any questions or issues     -All questions answered, patient agrees with plan      History of Present Illness  57 y o  male with a history of elevated PSA presents today for follow up  Patient last seen in the office in September 2021  His PSA at that time increased from 1 4 to 2 7  This prompted a multiparametric MRI of prostate performed in November 2021  Results identified PI-RADS 2, low risk for prostate cancer  Prostate measured 44 cc  He denies any strong family history of prostate cancer  Patient denies any lower urinary tract symptoms, gross hematuria, or dysuria  No changes to his overall health  Review of Systems  Review of Systems   Constitutional: Negative  HENT: Negative  Respiratory: Negative  Cardiovascular: Negative  Gastrointestinal: Negative  Genitourinary: Negative for decreased urine volume, difficulty urinating, dysuria, flank pain, frequency, hematuria and urgency  Musculoskeletal: Negative  Skin: Negative  Neurological: Negative  Psychiatric/Behavioral: Negative        AUA SYMPTOM SCORE    Flowsheet Row Most Recent Value   AUA SYMPTOM SCORE    How often have you had a sensation of not emptying your bladder completely after you finished urinating? 0   How often have you had to urinate again less than two hours after you finished urinating? 0   How often have you found you stopped and started again several times when you urinate? 0   How often have you found it difficult to postpone urination? 2   How often have you had a weak urinary stream? 0   How often have you had to push or strain to begin urination? 0   How many times did you most typically get up to urinate from the time you went to bed at night until the time you got up in the morning?  2  [pt drinks alot of water]   Quality of Life: If you were to spend the rest of your life with your urinary condition just the way it is now, how would you feel about that? 1   AUA SYMPTOM SCORE 4          Past Medical History  Past Medical History:   Diagnosis Date    Cancer ([facility] [address] [facility]     COVID-19     Hyperlipidemia     [facility]     last assessed 10/25/17, resolved 4/20/15       Past Social History  Past Surgical History:   Procedure Laterality Date    COLONOSCOPY  2012    Dr Christy Gupta   Impression 1/12/15    HEMORROIDECTOMY      SKIN LESION EXCISION Right     Elbow     TONSILECTOMY AND ADNOIDECTOMY         Past Family History  Family History   Problem Relation Age of Onset    Diabetes Mother     Skin cancer Mother     Coronary artery disease Father        Past Social history  Social History     Socioeconomic History    Marital status: /Civil Union     Spouse name: Not on file    Number of children: Not on file    Years of education: Not on file    Highest education level: Not on file   Occupational History    Not on file   Tobacco Use    Smoking status: Never Smoker    Smokeless tobacco: Never Used   Vaping Use    Vaping Use: Never used   Substance and Sexual Activity    Alcohol use: Yes     Comment: Social     Drug use: No    Sexual activity: Yes   Other Topics Concern    Not on file   Social History Narrative    Not on file     Social Determinants of Health     Financial Resource Strain: Not on file   Food Insecurity: Not on file   Transportation Needs: Not on file   Physical Activity: Not on file   Stress: Not on file   Social Connections: Not on file   Intimate Partner Violence: Not on file   Housing Stability: Not on file       Current Medications  Current Outpatient Medications   Medication Sig Dispense Refill    amLODIPine (NORVASC) 5 mg tablet TAKE 2 TABLETS BY MOUTH EVERY MORNING 180 tablet 1    Ascorbic Acid (VITAMIN C) 1000 MG tablet Take 1 tablet by mouth daily      escitalopram (LEXAPRO) 20 mg tablet TAKE 1 TABLET BY MOUTH EVERY DAY 90 tablet 1    rosuvastatin (CRESTOR) 10 MG tablet Take 1 tablet (10 mg total) by mouth daily 90 tablet 0    EPINEPHrine (EpiPen 2-Sherman) 0 3 mg/0 3 mL SOAJ Inject 0 3 mL (0 3 mg total) into a muscle once for 1 dose (Patient not taking: Reported on 4/15/2022 ) 1 each 1     No current facility-administered medications for this visit  Allergies  Allergies   Allergen Reactions    Bee Venom Edema    Celecoxib Hives     Reaction Date: 21Nov2011; Category: Allergy; Past Medical History, Social History, Family History, medications and allergies were reviewed  Vitals  Vitals:    09/12/22 0836   BP: 122/74   Pulse: 65   SpO2: 98%   Weight: 104 kg (230 lb)   Height: 6' 4" (1 93 m)       Physical Exam  Physical Exam  Constitutional:       Appearance: Normal appearance  He is well-developed  HENT:      Head: Normocephalic  Eyes:      Pupils: Pupils are equal, round, and reactive to light  Pulmonary:      Effort: Pulmonary effort is normal    Abdominal:      Palpations: Abdomen is soft  Genitourinary:     Prostate: Normal       Rectum: Normal       Comments: Prostate 45 g, smooth, nontender, no nodules  Musculoskeletal:         General: Normal range of motion  Cervical back: Normal range of motion  Skin:     General: Skin is warm and dry  Neurological:      General: No focal deficit present  Mental Status: He is alert and oriented to person, place, and time  Psychiatric:         Mood and Affect: Mood normal          Behavior: Behavior normal          Thought Content: Thought content normal          Judgment: Judgment normal          Results    I have personally reviewed all pertinent lab results and reviewed with patient  Lab Results   Component Value Date    PSA 2 6 09/08/2022    PSA 2 7 10/05/2021    PSA 2 7 08/13/2021     Lab Results   Component Value Date    GLUCOSE 85 11/05/2015    CALCIUM 9 5 04/06/2022     11/05/2015    K 4 2 04/06/2022    CO2 29 04/06/2022     04/06/2022    BUN 31 (H) 04/06/2022    CREATININE 1 09 04/06/2022     Lab Results   Component Value Date    WBC 6 68 08/14/2018    HGB 14 9 08/14/2018    HCT 42 3 08/14/2018    MCV 89 08/14/2018     08/14/2018     No results found for this or any previous visit (from the past 1 hour(s))

## 2022-09-13 ENCOUNTER — EVALUATION (OUTPATIENT)
Dept: PHYSICAL THERAPY | Facility: CLINIC | Age: 62
End: 2022-09-13
Payer: COMMERCIAL

## 2022-09-13 DIAGNOSIS — M24.112 LABRAL TEAR OF SHOULDER, DEGENERATIVE, LEFT: ICD-10-CM

## 2022-09-13 DIAGNOSIS — G89.29 CHRONIC LEFT SHOULDER PAIN: Primary | ICD-10-CM

## 2022-09-13 DIAGNOSIS — M25.512 CHRONIC LEFT SHOULDER PAIN: Primary | ICD-10-CM

## 2022-09-13 PROCEDURE — 97140 MANUAL THERAPY 1/> REGIONS: CPT | Performed by: PHYSICAL THERAPIST

## 2022-09-13 PROCEDURE — 97161 PT EVAL LOW COMPLEX 20 MIN: CPT | Performed by: PHYSICAL THERAPIST

## 2022-09-13 NOTE — PROGRESS NOTES
PT Evaluation     Today's date: 2022  Patient name: Tonio Norton  :   MRN: [id number]  Referring provider: Jazmin Mullins  Dx:   Encounter Diagnosis     ICD-10-CM    1  Chronic left shoulder pain  M25 512     G89 29    2  Labral tear of shoulder, degenerative, left  M24 112 Ambulatory Referral to Physical Therapy                  Assessment  Assessment details: Patient is a 57 y o  male who presents to outpatient PT with pain, decreased rom, decreased strength and decreased functional mobility  He will benefit from skilled PT to address these deficits in order to achieve his goals and maximize his functional mobility  Goals  Short Term Goals: Independent performance of initial hep  Decrease pain 2 points on VAS      Long Term Goals: Independent performance of comprehensive hep  Performance of IADL's is returned to max level of function  Performance in recreational activities is improved to max level of function  Able to reach overhead with min pain    Plan  Planned therapy interventions: abdominal trunk stabilization, IADL retraining, joint mobilization, manual therapy, massage, strengthening, therapeutic exercise, therapeutic training, therapeutic activities, stretching and home exercise program  Frequency: 2x week  Duration in weeks: 6        Subjective Evaluation    History of Present Illness  Mechanism of injury: Pt reports that he noticed on exacerbation of L shoulder pain in , pt is unable to identify a trigger for this  Reports that secondary to pain he stopped performing his PT from his previous PT episode  Reports that he pain pain when reaching above shoulder height and into abduction  Reports that 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  Reports occasional disturbances at night from pain  MRI revealed degenerative labral tear and that no surgery was indicated at this time      Pain  Current pain rating: 1  At best pain ratin  At worst pain ratin    Patient Goals  Patient goals for therapy: decreased pain, increased motion, increased strength, independence with ADLs/IADLs and return to sport/leisure activities          Objective     L shoulder    AROM:   Flexion: 140   Abduction: 110, pain limited     Shoulder shrug with end range flexion and abduction    PROM:   Flexion:150   Abduction: 130   ER:  40   IR: 45    Strength:    Flexion: 4 /5   Abduction:   4/5   ER:    4/5, pain produced   IR:   4+  /5      Tender to palpation: deltoid insertion and long head of biceps        Norman: pos  Neer: neg  Empty can: pos  Painful arc: neg  Scour: pos for crepitus  Biceps load: pos  Speeds: pos                  Flowsheet Rows    Flowsheet Row Most Recent Value   PT/OT G-Codes    Current Score 57   Projected Score 70             Precautions: chronic L shoulder pain, chronic knee pain      Manuals             X-friction masssage deltoid/long head of biceps kl            prom kl            graston NV                         Neuro Re-Ed                                                                                                        Ther Ex             Pulleys flex/abd             sidelying ER             rows             Shoulder ext             Sleeper stretch             X-body stretch                                       Ther Activity             ube                          Gait Training                                       Modalities

## 2022-09-15 ENCOUNTER — OFFICE VISIT (OUTPATIENT)
Dept: PHYSICAL THERAPY | Facility: CLINIC | Age: 62
End: 2022-09-15
Payer: COMMERCIAL

## 2022-09-15 DIAGNOSIS — M25.512 CHRONIC LEFT SHOULDER PAIN: ICD-10-CM

## 2022-09-15 DIAGNOSIS — G89.29 CHRONIC LEFT SHOULDER PAIN: ICD-10-CM

## 2022-09-15 DIAGNOSIS — M24.112 LABRAL TEAR OF SHOULDER, DEGENERATIVE, LEFT: Primary | ICD-10-CM

## 2022-09-15 PROCEDURE — 97110 THERAPEUTIC EXERCISES: CPT | Performed by: PHYSICAL THERAPIST

## 2022-09-15 PROCEDURE — 97140 MANUAL THERAPY 1/> REGIONS: CPT | Performed by: PHYSICAL THERAPIST

## 2022-09-15 PROCEDURE — 97530 THERAPEUTIC ACTIVITIES: CPT | Performed by: PHYSICAL THERAPIST

## 2022-09-15 NOTE — PROGRESS NOTES
Daily Note     Today's date: 9/15/2022  Patient name: Tomas Martínez  :   MRN: [id number]  Referring provider: Marly Soriano*  Dx:   Encounter Diagnosis     ICD-10-CM    1  Labral tear of shoulder, degenerative, left  M24 112    2  Chronic left shoulder pain  M25 512     G89 29        Start Time: 0936  Stop Time: 1015  Total time in clinic (min): 39 minutes    Subjective: Pt reports he is feeling okay today and had some lingering soreness after last session with PROM that lasted into this morning  Objective: See treatment diary below      Assessment: Pt presents to PT with mild soreness following last session, so focus of session was on manual tx for pain relief and ROM  Pt continues to present with tenderness in anterolateral deltoid around insertion, so initiated graston which pt was able to tolerate well  Pt was also able to tolerate PROM exercises but noted pain at end range in all directions, with flexion and abduction being worst  Pt continues to have limitations in ROM in all directions but improves with PROM  Pt demonstrates good tolerance to activity with initiation of therex program despite increased pain  Continue to focus on manual tx over next few sessions and add to therex program as able pending pt's tolerance to activity  Plan: Continue per plan of care        Precautions: chronic L shoulder pain, chronic knee pain      Manuals 9/13 9/15           X-friction masssage deltoid/long head of biceps kl SG           prom kl SG           graston NV SG deltoid/biceps                        Neuro Re-Ed                                                                                                        Ther Ex             Pulleys flex/abd  5" x30 ea           sidelying ER             rows             Shoulder ext             Sleeper stretch             X-body stretch  20" x3                                     Ther Activity             ube  4'/4'                        Gait Training                                       Modalities                                           Treatment performed by  Wilton MACHADO, I attest that this treatment was directly supervised by Mikhail Campbell DPT

## 2022-09-20 ENCOUNTER — OFFICE VISIT (OUTPATIENT)
Dept: PHYSICAL THERAPY | Facility: CLINIC | Age: 62
End: 2022-09-20
Payer: COMMERCIAL

## 2022-09-20 DIAGNOSIS — M24.112 LABRAL TEAR OF SHOULDER, DEGENERATIVE, LEFT: Primary | ICD-10-CM

## 2022-09-20 DIAGNOSIS — G89.29 CHRONIC LEFT SHOULDER PAIN: ICD-10-CM

## 2022-09-20 DIAGNOSIS — M25.512 CHRONIC LEFT SHOULDER PAIN: ICD-10-CM

## 2022-09-20 PROCEDURE — 97140 MANUAL THERAPY 1/> REGIONS: CPT

## 2022-09-20 PROCEDURE — 97530 THERAPEUTIC ACTIVITIES: CPT

## 2022-09-20 PROCEDURE — 97112 NEUROMUSCULAR REEDUCATION: CPT

## 2022-09-20 PROCEDURE — 97110 THERAPEUTIC EXERCISES: CPT

## 2022-09-20 NOTE — PROGRESS NOTES
Daily Note     Today's date: 2022  Patient name: Zeenat Hilliard  :   MRN: [id number]  Referring provider: Yousif Bailey*  Dx:   Encounter Diagnosis     ICD-10-CM    1  Labral tear of shoulder, degenerative, left  M24 112    2  Chronic left shoulder pain  M25 512     G89 29                   Subjective: Pt states his shoulder hurts when he moves his arm  Pt states he has noticed no changes in his shoulder  Objective: See treatment diary below      Assessment: Pt progressd through stretches with no increase in pain  Pt was tender to palpation with manual Pt  Pt continues to have muscle restriction seen in the UT and LS  Pt had improved ROM post IASTM  Pt would continue to benefit from PT  Plan: Continue per plan of care        Precautions: chronic L shoulder pain, chronic knee pain      Manuals 9/13 9/15 9/20           X-friction masssage deltoid/long head of biceps kl SG CF          prom kl SG CF          graston NV SG deltoid/biceps CF                       Neuro Re-Ed                                                                                                        Ther Ex             Pulleys flex/abd  5" x30 ea 5" x 30 ea           sidelying ER             rows             Shoulder ext             Sleeper stretch             X-body stretch  20" x3 20" x 3                                    Ther Activity             ube  4'/4' 4'/4'                        Gait Training                                       Modalities

## 2022-09-23 ENCOUNTER — OFFICE VISIT (OUTPATIENT)
Dept: PHYSICAL THERAPY | Facility: CLINIC | Age: 62
End: 2022-09-23
Payer: COMMERCIAL

## 2022-09-23 DIAGNOSIS — G89.29 CHRONIC LEFT SHOULDER PAIN: ICD-10-CM

## 2022-09-23 DIAGNOSIS — M25.512 CHRONIC LEFT SHOULDER PAIN: ICD-10-CM

## 2022-09-23 DIAGNOSIS — M24.112 LABRAL TEAR OF SHOULDER, DEGENERATIVE, LEFT: Primary | ICD-10-CM

## 2022-09-23 PROCEDURE — 97530 THERAPEUTIC ACTIVITIES: CPT

## 2022-09-23 PROCEDURE — 97112 NEUROMUSCULAR REEDUCATION: CPT

## 2022-09-23 PROCEDURE — 97110 THERAPEUTIC EXERCISES: CPT

## 2022-09-23 PROCEDURE — 97140 MANUAL THERAPY 1/> REGIONS: CPT

## 2022-09-23 NOTE — PROGRESS NOTES
Daily Note     Today's date: 2022  Patient name: Judith García  :   MRN: [id number]  Referring provider: Parvin Umaña  Dx:   Encounter Diagnosis     ICD-10-CM    1  Labral tear of shoulder, degenerative, left  M24 112    2  Chronic left shoulder pain  M25 512     G89 29                   Subjective: Pt states his shoulder is doing well overall with no complaints of pain at rest or slight movement  Notes pain only occurs when he moves his arm in certain directions mostly at end range  Objective: See treatment diary below      Assessment: Pt continues to progress throughout session with no increased symptoms besides at end range into flexion and abduction noting increased pain  Active movements were pain free t/o session  Pt would continue to benefit from PT  Plan: Continue per plan of care        Precautions: chronic L shoulder pain, chronic knee pain      Manuals 9/13 9/15 9/20  9/23         X-friction masssage deltoid/long head of biceps kl SG CF MM         prom kl SG CF MM         graston NV SG deltoid/biceps CF MM                      Neuro Re-Ed                                                                                                        Ther Ex             Pulleys flex/abd  5" x30 ea 5" x 30 ea  5" x 30 ea         sidelying ER             rows             Shoulder ext             Sleeper stretch             X-body stretch  20" x3 20" x 3 20"x3                                   Ther Activity             ube  4'/4' 4'/4'  4'/4'                      Gait Training                                       Modalities

## 2022-09-27 ENCOUNTER — OFFICE VISIT (OUTPATIENT)
Dept: PHYSICAL THERAPY | Facility: CLINIC | Age: 62
End: 2022-09-27
Payer: COMMERCIAL

## 2022-09-27 DIAGNOSIS — G89.29 CHRONIC LEFT SHOULDER PAIN: ICD-10-CM

## 2022-09-27 DIAGNOSIS — M24.112 LABRAL TEAR OF SHOULDER, DEGENERATIVE, LEFT: Primary | ICD-10-CM

## 2022-09-27 DIAGNOSIS — M25.512 CHRONIC LEFT SHOULDER PAIN: ICD-10-CM

## 2022-09-27 PROCEDURE — 97530 THERAPEUTIC ACTIVITIES: CPT | Performed by: PHYSICAL THERAPIST

## 2022-09-27 PROCEDURE — 97110 THERAPEUTIC EXERCISES: CPT | Performed by: PHYSICAL THERAPIST

## 2022-09-27 PROCEDURE — 97140 MANUAL THERAPY 1/> REGIONS: CPT | Performed by: PHYSICAL THERAPIST

## 2022-09-27 NOTE — PROGRESS NOTES
Daily Note     Today's date: 2022  Patient name: Sonia Mckeon  : [id number]  MRN: [id number]  Referring provider: Lucinda Kussmaul*  Dx:   Encounter Diagnosis     ICD-10-CM    1  Labral tear of shoulder, degenerative, left  M24 112    2  Chronic left shoulder pain  M25 512     G89 29                   Subjective: Pt reports his arm is feeling really fatigued today after cutting his lawn  Objective: See treatment diary below      Assessment: Tolerated treatment well  Patient exhibited good technique with therapeutic exercises and would benefit from continued PT   No complaints with addition of new exercises  Plan: Continue per plan of care        Precautions: chronic L shoulder pain, chronic knee pain      Manuals 9/13 9/15 9/20  9/23 9/27        X-friction masssage deltoid/long head of biceps kl SG CF MM         prom kl SG CF MM KT        graston NV SG deltoid/biceps CF MM KT deltoid insertion                     Neuro Re-Ed                                                                                                        Ther Ex             Pulleys flex/abd  5" x30 ea 5" x 30 ea  5" x 30 ea 5"x30        sidelying ER     20x        rows     20x blue        Shoulder ext     20x blue        Sleeper stretch     30"x3        X-body stretch  20" x3 20" x 3 20"x3 30"x3                                  Ther Activity             ube  4'/4' 4'/4'  4'/4' 4'/4'                     Gait Training                                       Modalities

## 2022-09-30 ENCOUNTER — OFFICE VISIT (OUTPATIENT)
Dept: PHYSICAL THERAPY | Facility: CLINIC | Age: 62
End: 2022-09-30
Payer: COMMERCIAL

## 2022-09-30 DIAGNOSIS — M25.512 CHRONIC LEFT SHOULDER PAIN: ICD-10-CM

## 2022-09-30 DIAGNOSIS — M24.112 LABRAL TEAR OF SHOULDER, DEGENERATIVE, LEFT: Primary | ICD-10-CM

## 2022-09-30 DIAGNOSIS — G89.29 CHRONIC LEFT SHOULDER PAIN: ICD-10-CM

## 2022-09-30 PROCEDURE — 97110 THERAPEUTIC EXERCISES: CPT | Performed by: PHYSICAL THERAPIST

## 2022-09-30 PROCEDURE — 97140 MANUAL THERAPY 1/> REGIONS: CPT | Performed by: PHYSICAL THERAPIST

## 2022-09-30 PROCEDURE — 97530 THERAPEUTIC ACTIVITIES: CPT | Performed by: PHYSICAL THERAPIST

## 2022-09-30 NOTE — PROGRESS NOTES
Daily Note     Today's date: 2022  Patient name: Zoë Pinto  :   MRN: [id number]  Referring provider: Lyle Avilez*  Dx:   Encounter Diagnosis     ICD-10-CM    1  Labral tear of shoulder, degenerative, left  M24 112    2  Chronic left shoulder pain  M25 512     G89 29                   Subjective: No pain at rest verbalized  Objective: See treatment diary below      Assessment: Tolerated treatment well  Patient exhibited good technique with therapeutic exercises and would benefit from continued PT as per primary PT POC  No pain post tx voiced  Plan: Continue per plan of care  Progress treatment as tolerated         Precautions: chronic L shoulder pain, chronic knee pain      Manuals 9/13 9/15 9/20  9/23 9/27 9/30       X-friction masssage deltoid/long head of biceps kl SG CF MM  SZ       prom kl SG CF MM KT SZ       graston NV SG deltoid/biceps CF MM KT deltoid insertion SZ                    Neuro Re-Ed                                                                                                        Ther Ex             Pulleys flex/abd  5" x30 ea 5" x 30 ea  5" x 30 ea 5"x30 5"x30 ea VC's/TC's for tech       sidelying ER     20x 20x       rows     20x blue 30x blue       Shoulder ext     20x blue 30x blue       Sleeper stretch     30"x3 30"x3 VC's/TC's       X-body stretch  20" x3 20" x 3 20"x3 30"x3 30"x3                                 Ther Activity             ube  4'/4' 4'/4'  4'/4' 4'/4' 4'/4'                    Gait Training                                       Modalities

## 2022-10-03 ENCOUNTER — VBI (OUTPATIENT)
Dept: ADMINISTRATIVE | Facility: OTHER | Age: 62
End: 2022-10-03

## 2022-10-12 ENCOUNTER — OFFICE VISIT (OUTPATIENT)
Dept: PHYSICAL THERAPY | Facility: CLINIC | Age: 62
End: 2022-10-12
Payer: COMMERCIAL

## 2022-10-12 DIAGNOSIS — M24.112 LABRAL TEAR OF SHOULDER, DEGENERATIVE, LEFT: Primary | ICD-10-CM

## 2022-10-12 PROCEDURE — 97110 THERAPEUTIC EXERCISES: CPT | Performed by: PHYSICAL THERAPIST

## 2022-10-12 PROCEDURE — 97140 MANUAL THERAPY 1/> REGIONS: CPT | Performed by: PHYSICAL THERAPIST

## 2022-10-12 PROCEDURE — 97530 THERAPEUTIC ACTIVITIES: CPT | Performed by: PHYSICAL THERAPIST

## 2022-10-12 NOTE — PROGRESS NOTES
BMI Counseling: Body mass index is 28 92 kg/m²  Discussed the patient's BMI with him  The BMI is above normal  Nutrition recommendations include reducing portion sizes  Assessment/Plan:    No problem-specific Assessment & Plan notes found for this encounter  Problem List Items Addressed This Visit     None            Subjective:      Patient ID: Brisa Aviles is a 62 y o  male  HPI 58-year old male coming in for a follow up visit regarding essential hypertension, anxiety, hyperlipidemia, allergy to bee sting, rotator cuff tear; The patient reports me compliant taking medications without untoward side effects the  The patient is here to review his medical condition, update me on the medical condition and the patient reports me no hospitalizations and no ER visits  The following portions of the patient's history were reviewed and updated as appropriate: allergies, current medications, past family history, past medical history, past social history, past surgical history and problem list     Review of Systems   Constitutional: Negative for appetite change and fever  HENT: Negative for congestion  Respiratory: Negative for cough, shortness of breath and wheezing  Cardiovascular: Negative for chest pain, palpitations and leg swelling  Gastrointestinal: Negative for abdominal pain, blood in stool, diarrhea and nausea  Musculoskeletal:        Chronic right shoulder pain   Psychiatric/Behavioral: Negative for self-injury and suicidal ideas  The patient is not nervous/anxious  Objective:    No follow-ups on file  No results found  Allergies   Allergen Reactions    Bee Venom Edema    Celecoxib Hives     Reaction Date: 21Nov2011; Category: Allergy;         Past Medical History:   Diagnosis Date    Cancer ([facility] [address] )     Hyperlipidemia     [facility]     last assessed 10/25/17, resolved 4/20/15     Past Surgical History:   Procedure Laterality Date    COLONOSCOPY  2012    Dr Lee Milligan   Impression 1/12/15    HEMORROIDECTOMY      SKIN LESION EXCISION Right     Elbow     TONSILECTOMY AND ADNOIDECTOMY       Current Outpatient Medications on File Prior to Visit   Medication Sig Dispense Refill    amLODIPine (NORVASC) 5 mg tablet TAKE 2 TABLETS IN THE MORNING  180 tablet 1    Ascorbic Acid (VITAMIN C) 1000 MG tablet Take 1 tablet by mouth daily      cyclobenzaprine (FLEXERIL) 10 mg tablet Take 1 tablet (10 mg total) by mouth 3 (three) times a day as needed for muscle spasms 30 tablet 0    escitalopram (LEXAPRO) 20 mg tablet TAKE 1 TABLET BY MOUTH EVERY DAY 90 tablet 1    lidocaine (LIDODERM) 5 % Place 1 patch on the skin daily Remove & Discard patch within 12 hours or as directed by MD 30 patch 0    methylPREDNISolone 4 MG tablet therapy pack Use as directed on package 1 each 0    rosuvastatin (CRESTOR) 5 mg tablet TAKE 1 TABLET BY MOUTH EVERY DAY 90 tablet 0    EPINEPHrine (EPIPEN 2-NICOLE) 0 3 mg/0 3 mL SOAJ Inject 0 3 mL (0 3 mg total) into a muscle once for 1 dose 1 each 1     No current facility-administered medications on file prior to visit        Family History   Problem Relation Age of Onset    Diabetes Mother     Skin cancer Mother     Coronary artery disease Father      Social History     Socioeconomic History    Marital status: /Civil Union     Spouse name: Not on file    Number of children: Not on file    Years of education: Not on file    Highest education level: Not on file   Occupational History    Not on file   Social Needs    Financial resource strain: Not on file    Food insecurity:     Worry: Not on file     Inability: Not on file    Transportation needs:     Medical: Not on file     Non-medical: Not on file   Tobacco Use    Smoking status: Never Smoker    Smokeless tobacco: Never Used   Substance and Sexual Activity    Alcohol use: Yes     Comment: Social     Drug use: No    Sexual activity: Not on file   Lifestyle    Physical activity:     Days per week: Not on file     Minutes per session: Not on file    Stress: Not on file   Relationships    Social connections:     Talks on phone: Not on file     Gets together: Not on file     Attends Nondenominational service: Not on file     Active member of club or organization: Not on file     Attends meetings of clubs or organizations: Not on file     Relationship status: Not on file    Intimate partner violence:     Fear of current or ex partner: Not on file     Emotionally abused: Not on file     Physically abused: Not on file     Forced sexual activity: Not on file   Other Topics Concern    Not on file   Social History Narrative    Not on file     Vitals:    09/30/19 1040   BP: 124/84   Pulse: 82   Resp: 16   SpO2: 98%   Weight: 108 kg (237 lb 9 6 oz)   Height: 6' 4" (1 93 m)     Results for orders placed or performed in visit on 09/12/19   Comprehensive metabolic panel   Result Value Ref Range    Sodium 137 136 - 145 mmol/L    Potassium 3 8 3 5 - 5 3 mmol/L    Chloride 106 100 - 108 mmol/L    CO2 26 21 - 32 mmol/L    ANION GAP 5 4 - 13 mmol/L    BUN 23 5 - 25 mg/dL    Creatinine 0 96 0 60 - 1 30 mg/dL    Glucose, Fasting 95 65 - 99 mg/dL    Calcium 8 8 8 3 - 10 1 mg/dL    AST 23 5 - 45 U/L    ALT 42 12 - 78 U/L    Alkaline Phosphatase 89 46 - 116 U/L    Total Protein 7 4 6 4 - 8 2 g/dL    Albumin 3 9 3 5 - 5 0 g/dL    Total Bilirubin 1 37 (H) 0 20 - 1 00 mg/dL    eGFR 85 ml/min/1 73sq m   Hemoglobin A1C   Result Value Ref Range    Hemoglobin A1C 5 2 4 2 - 6 3 %     mg/dl   Lipid Panel with Direct LDL reflex   Result Value Ref Range    Cholesterol 164 50 - 200 mg/dL    Triglycerides 71 <=150 mg/dL    HDL, Direct 51 40 - 60 mg/dL    LDL Calculated 99 0 - 100 mg/dL     Weight (last 2 days)     Date/Time   Weight    09/30/19 1040   108 (237 6)            Body mass index is 28 92 kg/m²    /84 (09/30/19 1040)    Temp      Pulse 82 (09/30/19 1040)   Resp 16 (09/30/19 1040)    SpO2 98 % (09/30/19 1040)      Vitals:    09/30/19 1040   Weight: 108 kg (237 lb 9 6 oz)     Vitals:    09/30/19 1040   Weight: 108 kg (237 lb 9 6 oz)       /84   Pulse 82   Resp 16   Ht 6' 4" (1 93 m)   Wt 108 kg (237 lb 9 6 oz)   SpO2 98%   BMI 28 92 kg/m²          Physical Exam   Constitutional: He appears well-developed and well-nourished  No distress  HENT:   Head: Normocephalic and atraumatic  Right Ear: External ear normal    Left Ear: External ear normal    Mouth/Throat: Oropharynx is clear and moist    Eyes: Pupils are equal, round, and reactive to light  Conjunctivae are normal  Right eye exhibits no discharge  Left eye exhibits no discharge  No scleral icterus  Neck: Neck supple  Cardiovascular: Normal rate, regular rhythm and normal heart sounds  Exam reveals no gallop and no friction rub  No murmur heard  Pulmonary/Chest: No respiratory distress  He has no wheezes  He has no rales  Abdominal: Soft  Bowel sounds are normal  He exhibits no distension and no mass  There is no tenderness  There is no rebound and no guarding  Musculoskeletal: He exhibits no edema or deformity  Lymphadenopathy:     He has no cervical adenopathy  Neurological: He is alert  Skin: He is not diaphoretic  Psychiatric: His mood appears anxious  He does not exhibit a depressed mood  He expresses no suicidal ideation  Double O-Z Flap Text: The defect edges were debeveled with a #15 scalpel blade.  Given the location of the defect, shape of the defect and the proximity to free margins a Double O-Z flap was deemed most appropriate.  Using a sterile surgical marker, an appropriate transposition flap was drawn incorporating the defect and placing the expected incisions within the relaxed skin tension lines where possible. The area thus outlined was incised deep to adipose tissue with a #15 scalpel blade.  The skin margins were undermined to an appropriate distance in all directions utilizing iris scissors.

## 2022-10-12 NOTE — PROGRESS NOTES
Daily Note     Today's date: 10/12/2022  Patient name: Jacquie Lackey  :   MRN: [id number]  Referring provider: Hermelinda Garcia*  Dx:   Encounter Diagnosis     ICD-10-CM    1  Labral tear of shoulder, degenerative, left  M24 112                   Subjective: pt reports that he is noticing improvements in his rom but that he continues to have sig pain when reaching in certain directions  Objective: See treatment diary below      Assessment: added tb ER with no complaints other then fatigue  Instructed pt to add this to his hep  ROM is approaching [facility] but continues to have limitation in end range flexion and IR  Plan: Continue per plan of care  Progress treatment as tolerated         Precautions: chronic L shoulder pain, chronic knee pain      Manuals 9/13 9/15 9/20  9/23 9/27 9/30 10/12      X-friction masssage deltoid/long head of biceps kl SG CF MM  SZ kl      prom kl SG CF MM KT SZ kl      graston NV SG deltoid/biceps CF MM KT deltoid insertion SZ kl                   Neuro Re-Ed                                                                                                        Ther Ex             Pulleys flex/abd  5" x30 ea 5" x 30 ea  5" x 30 ea 5"x30 5"x30 ea VC's/TC's for tech 5"x30ea      sidelying ER     20x 20x       rows     20x blue 30x blue black x30      Shoulder ext     20x blue 30x blue black x30      Sleeper stretch     30"x3 30"x3 VC's/TC's 30"x3      X-body stretch  20" x3 20" x 3 20"x3 30"x3 30"x3 30"x3      Green tb        5"x20                   Ther Activity             ube  4'/4' 4'/4'  4'/4' 4'/4' 4'/4' 4'/4                   Gait Training                                       Modalities

## 2022-10-13 ENCOUNTER — APPOINTMENT (OUTPATIENT)
Dept: LAB | Facility: CLINIC | Age: 62
End: 2022-10-13
Payer: COMMERCIAL

## 2022-10-13 DIAGNOSIS — E78.5 HYPERLIPIDEMIA, UNSPECIFIED HYPERLIPIDEMIA TYPE: ICD-10-CM

## 2022-10-13 DIAGNOSIS — R73.03 PREDIABETES: ICD-10-CM

## 2022-10-13 LAB
ALBUMIN SERPL BCP-MCNC: 3.8 G/DL (ref 3.5–5)
ALP SERPL-CCNC: 73 U/L (ref 46–116)
ALT SERPL W P-5'-P-CCNC: 35 U/L (ref 12–78)
ANION GAP SERPL CALCULATED.3IONS-SCNC: 6 MMOL/L (ref 4–13)
AST SERPL W P-5'-P-CCNC: 25 U/L (ref 5–45)
BILIRUB SERPL-MCNC: 2.07 MG/DL (ref 0.2–1)
BUN SERPL-MCNC: 27 MG/DL (ref 5–25)
CALCIUM SERPL-MCNC: 9.1 MG/DL (ref 8.3–10.1)
CHLORIDE SERPL-SCNC: 107 MMOL/L (ref 96–108)
CHOLEST SERPL-MCNC: 138 MG/DL
CO2 SERPL-SCNC: 26 MMOL/L (ref 21–32)
CREAT SERPL-MCNC: 1 MG/DL (ref 0.6–1.3)
EST. AVERAGE GLUCOSE BLD GHB EST-MCNC: 108 MG/DL
GFR SERPL CREATININE-BSD FRML MDRD: 79 ML/MIN/1.73SQ M
GLUCOSE P FAST SERPL-MCNC: 114 MG/DL (ref 65–99)
HBA1C MFR BLD: 5.4 %
HDLC SERPL-MCNC: 50 MG/DL
LDLC SERPL CALC-MCNC: 74 MG/DL (ref 0–100)
POTASSIUM SERPL-SCNC: 3.8 MMOL/L (ref 3.5–5.3)
PROT SERPL-MCNC: 7.2 G/DL (ref 6.4–8.4)
SODIUM SERPL-SCNC: 139 MMOL/L (ref 135–147)
TRIGL SERPL-MCNC: 71 MG/DL

## 2022-10-13 PROCEDURE — 80053 COMPREHEN METABOLIC PANEL: CPT

## 2022-10-13 PROCEDURE — 36415 COLL VENOUS BLD VENIPUNCTURE: CPT

## 2022-10-13 PROCEDURE — 83036 HEMOGLOBIN GLYCOSYLATED A1C: CPT

## 2022-10-13 PROCEDURE — 80061 LIPID PANEL: CPT

## 2022-10-14 ENCOUNTER — APPOINTMENT (OUTPATIENT)
Dept: PHYSICAL THERAPY | Facility: CLINIC | Age: 62
End: 2022-10-14

## 2022-10-18 ENCOUNTER — APPOINTMENT (OUTPATIENT)
Dept: PHYSICAL THERAPY | Facility: CLINIC | Age: 62
End: 2022-10-18

## 2022-10-21 ENCOUNTER — OFFICE VISIT (OUTPATIENT)
Dept: INTERNAL MEDICINE CLINIC | Facility: CLINIC | Age: 62
End: 2022-10-21
Payer: COMMERCIAL

## 2022-10-21 ENCOUNTER — OFFICE VISIT (OUTPATIENT)
Dept: PHYSICAL THERAPY | Facility: CLINIC | Age: 62
End: 2022-10-21
Payer: COMMERCIAL

## 2022-10-21 VITALS
SYSTOLIC BLOOD PRESSURE: 128 MMHG | BODY MASS INDEX: 28.52 KG/M2 | HEART RATE: 69 BPM | DIASTOLIC BLOOD PRESSURE: 82 MMHG | RESPIRATION RATE: 16 BRPM | HEIGHT: 76 IN | WEIGHT: 234.2 LBS | OXYGEN SATURATION: 96 %

## 2022-10-21 DIAGNOSIS — G89.29 CHRONIC LEFT SHOULDER PAIN: ICD-10-CM

## 2022-10-21 DIAGNOSIS — Z23 NEED FOR VACCINATION: Primary | ICD-10-CM

## 2022-10-21 DIAGNOSIS — I10 ESSENTIAL HYPERTENSION: ICD-10-CM

## 2022-10-21 DIAGNOSIS — Z00.00 WELLNESS EXAMINATION: ICD-10-CM

## 2022-10-21 DIAGNOSIS — M24.112 LABRAL TEAR OF SHOULDER, DEGENERATIVE, LEFT: Primary | ICD-10-CM

## 2022-10-21 DIAGNOSIS — E78.5 HYPERLIPIDEMIA, UNSPECIFIED HYPERLIPIDEMIA TYPE: ICD-10-CM

## 2022-10-21 DIAGNOSIS — M25.512 CHRONIC LEFT SHOULDER PAIN: ICD-10-CM

## 2022-10-21 PROCEDURE — 99396 PREV VISIT EST AGE 40-64: CPT | Performed by: INTERNAL MEDICINE

## 2022-10-21 PROCEDURE — 97110 THERAPEUTIC EXERCISES: CPT

## 2022-10-21 PROCEDURE — 97530 THERAPEUTIC ACTIVITIES: CPT

## 2022-10-21 PROCEDURE — 90682 RIV4 VACC RECOMBINANT DNA IM: CPT

## 2022-10-21 PROCEDURE — 97140 MANUAL THERAPY 1/> REGIONS: CPT

## 2022-10-21 PROCEDURE — 90471 IMMUNIZATION ADMIN: CPT

## 2022-10-21 RX ORDER — ROSUVASTATIN CALCIUM 10 MG/1
TABLET, COATED ORAL
Qty: 90 TABLET | Refills: 0 | Status: SHIPPED | OUTPATIENT
Start: 2022-10-21

## 2022-10-21 NOTE — PROGRESS NOTES
Daily Note     Today's date: 10/21/2022  Patient name: Queta Rojas  : [id number]  MRN: [id number]  Referring provider: Julieta Tam  Dx:   Encounter Diagnosis     ICD-10-CM    1  Labral tear of shoulder, degenerative, left  M24 112    2  Chronic left shoulder pain  M25 512     G89 29                   Subjective: Pt reports his L shoulder is feeling good  Objective: See treatment diary below    Assessment: Performed exercise program w/o difficulty or discomfort  Discomfort during X-friction massage  PROM to L shoulder w/o complaint  Will monitor  Plan: Cont per POC       Precautions: chronic L shoulder pain, chronic knee pain      Manuals 9/13 9/15 9/20  9/23 9/27 9/30 10/12 10/21     X-friction masssage deltoid/long head of biceps kl SG CF MM  SZ  MO     prom kl SG CF MM KT SZ kl MO     graston NV SG deltoid/biceps CF MM KT deltoid insertion SZ kl MO                  Neuro Re-Ed                                                                                                        Ther Ex             Pulleys flex/abd  5" x30 ea 5" x 30 ea  5" x 30 ea 5"x30 5"x30 ea VC's/TC's for tech 5"x30ea 5"x30 ea     sidelying ER     20x 20x       rows     20x blue 30x blue black x30 Black  x30     Shoulder ext     20x blue 30x blue black x30 Black  x30     Sleeper stretch     30"x3 30"x3 VC's/TC's 30"x3 30"x3     X-body stretch  20" x3 20" x 3 20"x3 30"x3 30"x3 30"x3 30"x3     Green tb        5"x20                   Ther Activity             ube  4'/4' 4'/4'  4'/4' 4'/4' 4'/4' 4'/4 4'/4'                  Gait Training                                       Modalities

## 2022-10-21 NOTE — PROGRESS NOTES
[facility]    NAME: Duane Garcia  AGE: 57 y o  SEX: male  : 1960     DATE: 10/23/2022     Assessment and Plan:     Problem List Items Addressed This Visit        Cardiovascular and Mediastinum    Essential hypertension     Hypertension - controlled, I have counseled patient following healthy balance diet, I would like the patient reduce sodium, exercise routinely, I would like the patient continued the med current medical regiment and we will continue to monitor  Blood pressure today 128/80 to continue Norvasc 5 mg once daily         Relevant Orders    Comprehensive metabolic panel    Lipid Panel with Direct LDL reflex       Other    Wellness examination     Assessment and plan 1  Health maintenance annual wellness examination overall the patient is clinically stable and doing well, we encouraged the patient to follow a healthy and balanced diet  We recommend that the patient exercise routinely approximately 30 minutes 5 times per week   We have reviewed the patient's vaccines and have made recommendations for updates if necessary annual flu shot consider the COVID booster       We will be ordering screening laboratories which are age appropriate  Return to the office in   6 months   call if any problems  Other Visit Diagnoses     Need for vaccination    -  Primary    Relevant Orders    influenza vaccine, quadrivalent, recombinant, PF, 0 5 mL, for patients 18 yr+ (FLUBLOK) (Completed)          Immunizations and preventive care screenings were discussed with patient today  Appropriate education was printed on patient's after visit summary  Discussed risks and benefits of prostate cancer screening  We discussed the controversial history of PSA screening for prostate cancer in the [place] as well as the risk of over detection and over treatment of prostate cancer by way of PSA screening    The patient understands that PSA blood testing is an imperfect way to screen for prostate cancer and that elevated PSA levels in the blood may also be caused by infection, inflammation, prostatic trauma or manipulation, urological procedures, or by benign prostatic enlargement  The role of the digital rectal examination in prostate cancer screening was also discussed and I discussed with him that there is large interobserver variability in the findings of digital rectal examination  Counseling:  Exercise: the importance of regular exercise/physical activity was discussed  Recommend exercise 3-5 times per week for at least 30 minutes  BMI Counseling: Body mass index is 28 51 kg/m²  The BMI is above normal  Nutrition recommendations include decreasing portion sizes and moderation in carbohydrate intake  Exercise recommendations include exercising 3-5 times per week  Rationale for BMI follow-up plan is due to patient being overweight or obese  Depression Screening and Follow-up Plan: Patient was screened for depression during today's encounter  They screened negative with a PHQ-2 score of 0  Return in about 6 months (around 4/21/2023)  Chief Complaint:     Chief Complaint   Patient presents with   • Annual Exam      History of Present Illness:     Adult Annual Physical   Patient here for a comprehensive physical exam  The patient reports no problems  Diet and Physical Activity  Diet/Nutrition: well balanced diet  Exercise: walking  2 6-3 1 miles /4 times per day   Depression Screening  PHQ-2/9 Depression Screening    Little interest or pleasure in doing things: 0 - not at all  Feeling down, depressed, or hopeless: 0 - not at all  PHQ-2 Score: 0  PHQ-2 Interpretation: Negative depression screen       General Health  Sleep: gets 7-8 hours of sleep on average  Hearing: normal - bilateral   Vision: goes for regular eye exams     Dental: brush         Health  Symptoms include: none  Nocturia with water urban      Review of Systems:     Review of Systems   Constitutional: Negative for activity change, appetite change and unexpected weight change  HENT: Negative for congestion  Eyes: Negative for visual disturbance  Respiratory: Negative for cough and shortness of breath  Cardiovascular: Negative for chest pain  Gastrointestinal: Negative for abdominal pain, diarrhea, nausea and vomiting  Neurological: Negative for dizziness, light-headedness and headaches        Past Medical History:     Past Medical History:   Diagnosis Date   • Cancer [facility])    • COVID-19    • Hyperlipidemia    • Malignant melanoma of skin ([facility] [address] )     last assessed 10/25/17, resolved 4/20/15      Past Surgical History:     Past Surgical History:   Procedure Laterality Date   • COLONOSCOPY  2012    Dr Yue Mandujano 1/12/15   • HEMORROIDECTOMY     • SKIN LESION EXCISION Right     Elbow    • TONSILECTOMY AND ADNOIDECTOMY        Family History:     Family History   Problem Relation Age of Onset   • Diabetes Mother    • Skin cancer Mother    • Coronary artery disease Father       Social History:     Social History     Socioeconomic History   • Marital status: /Civil Union     Spouse name: None   • Number of children: None   • Years of education: None   • Highest education level: None   Occupational History   • None   Tobacco Use   • Smoking status: Never Smoker   • Smokeless tobacco: Never Used   Vaping Use   • Vaping Use: Never used   Substance and Sexual Activity   • Alcohol use: Yes     Comment: Social    • Drug use: No   • Sexual activity: Yes   Other Topics Concern   • None   Social History Narrative   • None     Social Determinants of Health     Financial Resource Strain: Not on file   Food Insecurity: Not on file   Transportation Needs: Not on file   Physical Activity: Not on file   Stress: Not on file   Social Connections: Not on file   Intimate Partner Violence: Not on file   Housing Stability: Not on file      Current Medications:     Current Outpatient Medications   Medication Sig Dispense Refill   • amLODIPine (NORVASC) 5 mg tablet TAKE 2 TABLETS BY MOUTH EVERY MORNING 180 tablet 1   • Ascorbic Acid (VITAMIN C) 1000 MG tablet Take 1 tablet by mouth daily     • escitalopram (LEXAPRO) 20 mg tablet TAKE 1 TABLET BY MOUTH EVERY DAY 90 tablet 1   • rosuvastatin (CRESTOR) 10 MG tablet TAKE 1 TABLET BY MOUTH EVERY DAY 90 tablet 0   • EPINEPHrine (EpiPen 2-Sherman) 0 3 mg/0 3 mL SOAJ Inject 0 3 mL (0 3 mg total) into a muscle once for 1 dose (Patient not taking: Reported on 4/15/2022 ) 1 each 1     No current facility-administered medications for this visit  Allergies: Allergies   Allergen Reactions   • Bee Venom Edema   • Celecoxib Hives     Reaction Date: 21Nov2011; Category: Allergy; Physical Exam:     /82   Pulse 69   Resp 16   Ht 6' 4" (1 93 m)   Wt 106 kg (234 lb 3 2 oz)   SpO2 96%   BMI 28 51 kg/m²     Physical Exam  Vitals and nursing note reviewed  Constitutional:       General: He is not in acute distress  Appearance: Normal appearance  He is well-developed  He is not ill-appearing, toxic-appearing or diaphoretic  HENT:      Head: Normocephalic and atraumatic  Right Ear: External ear normal       Left Ear: External ear normal       Nose: Nose normal    Eyes:      Pupils: Pupils are equal, round, and reactive to light  Cardiovascular:      Rate and Rhythm: Normal rate and regular rhythm  Heart sounds: Normal heart sounds  No murmur heard  Pulmonary:      Effort: Pulmonary effort is normal       Breath sounds: Normal breath sounds  Abdominal:      General: There is no distension  Palpations: Abdomen is soft  Tenderness: There is no abdominal tenderness  There is no guarding  Neurological:      Mental Status: He is alert            Jacky Njaera DO  [facility]

## 2022-10-21 NOTE — ASSESSMENT & PLAN NOTE
Hypertension - controlled, I have counseled patient following healthy balance diet, I would like the patient reduce sodium, exercise routinely, I would like the patient continued the med current medical regiment and we will continue to monitor    Blood pressure today 128/80 to continue Norvasc 5 mg once daily

## 2022-10-21 NOTE — ASSESSMENT & PLAN NOTE
Assessment and plan 1  Health maintenance annual wellness examination overall the patient is clinically stable and doing well, we encouraged the patient to follow a healthy and balanced diet  We recommend that the patient exercise routinely approximately 30 minutes 5 times per week   We have reviewed the patient's vaccines and have made recommendations for updates if necessary annual flu shot consider the COVID booster       We will be ordering screening laboratories which are age appropriate  Return to the office in   6 months   call if any problems

## 2022-10-25 ENCOUNTER — OFFICE VISIT (OUTPATIENT)
Dept: PHYSICAL THERAPY | Facility: CLINIC | Age: 62
End: 2022-10-25
Payer: COMMERCIAL

## 2022-10-25 DIAGNOSIS — M25.512 CHRONIC LEFT SHOULDER PAIN: ICD-10-CM

## 2022-10-25 DIAGNOSIS — M24.112 LABRAL TEAR OF SHOULDER, DEGENERATIVE, LEFT: Primary | ICD-10-CM

## 2022-10-25 DIAGNOSIS — G89.29 CHRONIC LEFT SHOULDER PAIN: ICD-10-CM

## 2022-10-25 PROCEDURE — 97140 MANUAL THERAPY 1/> REGIONS: CPT

## 2022-10-25 PROCEDURE — 97110 THERAPEUTIC EXERCISES: CPT

## 2022-10-25 PROCEDURE — 97530 THERAPEUTIC ACTIVITIES: CPT

## 2022-10-25 NOTE — PROGRESS NOTES
Daily Note     Today's date: 10/25/2022  Patient name: Mar Ramos  :   MRN: [id number]  Referring provider: Waqar Moreno*  Dx:   Encounter Diagnosis     ICD-10-CM    1  Labral tear of shoulder, degenerative, left  M24 112    2  Chronic left shoulder pain  M25 512     G89 29                   Subjective: "It doesn't hurt at all "  Notes the sleeper stretch causes discomfort  Objective: See treatment diary below    Assessment: Pt performed exercise program w/o issue  Very tender during X-friction massage  Restrictions noted during IASTM as below  Comfortable throughout PROM to L shoulder  Will monitor  Plan: Cont per POC       Precautions: chronic L shoulder pain, chronic knee pain      Manuals 9/13 9/15 9/20  9/23 9/27 9/30 10/12 10/21 10/25    X-friction masssage deltoid/long head of biceps kl SG CF MM  SZ  MO MO    prom kl SG CF MM KT SZ kl MO MO    graston NV SG deltoid/biceps CF MM KT deltoid insertion SZ kl MO MO                 Neuro Re-Ed                                                                                                        Ther Ex             Pulleys flex/abd  5" x30 ea 5" x 30 ea  5" x 30 ea 5"x30 5"x30 ea VC's/TC's for tech 5"x30ea 5"x30 ea 5"x30 ea    sidelying ER     20x 20x       rows     20x blue 30x blue black x30 Black  x30 Black  x30    Shoulder ext     20x blue 30x blue black x30 Black  x30 Black   x30    Sleeper stretch     30"x3 30"x3 VC's/TC's 30"x3 30"x3 30"x3    X-body stretch  20" x3 20" x 3 20"x3 30"x3 30"x3 30"x3 30"x3 30"x3    Green tb        5"x20                   Ther Activity             ube  4'/4' 4'/4'  4'/4' 4'/4' 4'/4' 4'/4 4'/4' 4'/4'                 Gait Training                                       Modalities

## 2022-10-27 ENCOUNTER — OFFICE VISIT (OUTPATIENT)
Dept: PHYSICAL THERAPY | Facility: CLINIC | Age: 62
End: 2022-10-27
Payer: COMMERCIAL

## 2022-10-27 DIAGNOSIS — G89.29 CHRONIC LEFT SHOULDER PAIN: ICD-10-CM

## 2022-10-27 DIAGNOSIS — M25.512 CHRONIC LEFT SHOULDER PAIN: ICD-10-CM

## 2022-10-27 DIAGNOSIS — M24.112 LABRAL TEAR OF SHOULDER, DEGENERATIVE, LEFT: Primary | ICD-10-CM

## 2022-10-27 PROCEDURE — 97110 THERAPEUTIC EXERCISES: CPT

## 2022-10-27 PROCEDURE — 97530 THERAPEUTIC ACTIVITIES: CPT

## 2022-10-27 PROCEDURE — 97140 MANUAL THERAPY 1/> REGIONS: CPT

## 2022-10-27 NOTE — PROGRESS NOTES
Daily Note     Today's date: 10/27/2022  Patient name: Vandana Muller  : [birth year]  MRN: [id number]  Referring provider: Briana Watkins*  Dx:   Encounter Diagnosis     ICD-10-CM    1  Labral tear of shoulder, degenerative, left  M24 112    2  Chronic left shoulder pain  M25 512     G89 29                   Subjective: Pt cont to report his L shoulder feels good  Objective: See treatment diary below    Assessment: Demonstrates good knowledge of exercise program  Responded well to manual therapies  Resume PROM NV  Will monitor  Plan: Cont per POC       Precautions: chronic L shoulder pain, chronic knee pain      Manuals 9/13 9/15 9/20  9/23 9/27 9/30 10/12 10/21 10/25 10/27   X-friction masssage deltoid/long head of biceps kl SG CF MM  SZ  MO MO MO   prom kl SG CF MM KT SZ kl MO MO nv   graston NV SG deltoid/biceps CF MM KT deltoid insertion SZ kl MO MO MO                Neuro Re-Ed                                                                                                        Ther Ex             Pulleys flex/abd  5" x30 ea 5" x 30 ea  5" x 30 ea 5"x30 5"x30 ea VC's/TC's for tech 5"x30ea 5"x30 ea 5"x30 ea 5"x30  ea   sidelying ER     20x 20x       rows     20x blue 30x blue black x30 Black  x30 Black  x30 Black  x30   Shoulder ext     20x blue 30x blue black x30 Black  x30 Black   x30 Black  x30   Sleeper stretch     30"x3 30"x3 VC's/TC's 30"x3 30"x3 30"x3 30"x3   X-body stretch  20" x3 20" x 3 20"x3 30"x3 30"x3 30"x3 30"x3 30"x3 30"x3   Green tb        5"x20                   Ther Activity             ube  4'/4' 4'/4'  4'/4' 4'/4' 4'/4' 4'/4 4'/4' 4'/4' 4'/4'                Gait Training                                       Modalities

## 2022-11-01 ENCOUNTER — EVALUATION (OUTPATIENT)
Dept: PHYSICAL THERAPY | Facility: CLINIC | Age: 62
End: 2022-11-01

## 2022-11-01 DIAGNOSIS — M24.112 LABRAL TEAR OF SHOULDER, DEGENERATIVE, LEFT: Primary | ICD-10-CM

## 2022-11-01 DIAGNOSIS — G89.29 CHRONIC LEFT SHOULDER PAIN: ICD-10-CM

## 2022-11-01 DIAGNOSIS — M25.512 CHRONIC LEFT SHOULDER PAIN: ICD-10-CM

## 2022-11-01 NOTE — PROGRESS NOTES
PT Re-Evaluation     Today's date: 2022  Patient name: Vandana Muller  :   MRN: [id number]  Referring provider: Briana Watkins*  Dx:   Encounter Diagnosis     ICD-10-CM    1  Labral tear of shoulder, degenerative, left  M24 112    2  Chronic left shoulder pain  M25 512     G89 29                   Assessment  Assessment details: Pt is being treated with outpatient PT  He has  made good gains in rom, strength, pain reduction and functional mobility but continues to have deficits  He will benefit from continued skilled PT to address these deficits and to progress to an independent hep  Thank you for this referral                   Goals  Short Term Goals: met  Independent performance of initial hep  Decrease pain 2 points on VAS      Long Term Goals: ongoing  Independent performance of comprehensive hep  Performance of IADL's is returned to max level of function  Performance in recreational activities is improved to max level of function  Able to reach overhead with min pain    Plan  Planned therapy interventions: abdominal trunk stabilization, IADL retraining, joint mobilization, manual therapy, massage, strengthening, therapeutic exercise, therapeutic training, therapeutic activities, stretching and home exercise program  Frequency: 2x week  Duration in weeks: 4        Subjective Evaluation    History of Present Illness  Mechanism of injury: Pt reports that in the past 1 5 weeks he has noticed a sig reduction in pain and improvement in his mobility  Reports that he is better able to reach his lower cabinets  Current complaints include 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  Reports good compliance with his current hep  MRI revealed degenerative labral tear and that no surgery was indicated at this time      Pain  Current pain ratin  At best pain ratin  At worst pain ratin    Patient Goals  Patient goals for therapy: decreased pain, increased motion, increased strength, independence with ADLs/IADLs and return to sport/leisure activities          Objective     L shoulder    AROM:   Flexion: 140   Abduction: 110, pain limited     Shoulder shrug with end range flexion and abduction    PROM:   Flexion:170   Abduction: 160   ER:  54   IR: 52    Strength:    Flexion: 4+ /5   Abduction:   4/5   ER:    4+/5, pain produced   IR:   4+  /5      Tender to palpation: deltoid insertion and long head of biceps        Norman: neg  Neer: neg  Empty can: pos  Painful arc: neg  Scour: pos for crepitus  Biceps load: pos  Speeds: pos                         Precautions: chronic L shoulder pain, chronic knee pain        Manuals 9/13 9/15 9/20  9/23 9/27 9/30 10/12 10/21 10/25 10/27 11/1   X-friction masssage deltoid/long head of biceps kl SG CF MM  SZ  MO MO MO kl   prom kl SG CF MM KT SZ kl MO MO nv kl   graston NV SG deltoid/biceps CF MM KT deltoid insertion SZ kl MO MO MO nv                 Neuro Re-Ed                                                                                                                Ther Ex              Pulleys flex/abd  5" x30 ea 5" x 30 ea  5" x 30 ea 5"x30 5"x30 ea VC's/TC's for tech 5"x30ea 5"x30 ea 5"x30 ea 5"x30  ea 5"x30   sidelying ER     20x 20x        rows     20x blue 30x blue black x30 Black  x30 Black  x30 Black  x30 x30   Shoulder ext     20x blue 30x blue black x30 Black  x30 Black   x30 Black  x30 Black  30   Sleeper stretch     30"x3 30"x3 VC's/TC's 30"x3 30"x3 30"x3 30"x3    X-body stretch  20" x3 20" x 3 20"x3 30"x3 30"x3 30"x3 30"x3 30"x3 30"x3 30"x3   Green tb        5"x20       D1 ext           black  30   scap punches           sidelying 3#2x10   Ther Activity              ube  4'/4' 4'/4'  4'/4' 4'/4' 4'/4' 4'/4 4'/4' 4'/4' 4'/4' 4'/4'                 Gait Training                                          Modalities

## 2022-11-03 ENCOUNTER — OFFICE VISIT (OUTPATIENT)
Dept: PHYSICAL THERAPY | Facility: CLINIC | Age: 62
End: 2022-11-03

## 2022-11-03 DIAGNOSIS — G89.29 CHRONIC LEFT SHOULDER PAIN: ICD-10-CM

## 2022-11-03 DIAGNOSIS — M24.112 LABRAL TEAR OF SHOULDER, DEGENERATIVE, LEFT: Primary | ICD-10-CM

## 2022-11-03 DIAGNOSIS — M25.512 CHRONIC LEFT SHOULDER PAIN: ICD-10-CM

## 2022-11-03 NOTE — PROGRESS NOTES
Daily Note     Today's date: 11/3/2022  Patient name: Gia Soares  :   MRN: [id number]  Referring provider: Candida Manuel*  Dx:   Encounter Diagnosis     ICD-10-CM    1  Labral tear of shoulder, degenerative, left  M24 112    2  Chronic left shoulder pain  M25 512     G89 29        Start Time: 1015  Stop Time: 1100  Total time in clinic (min): 45 minutes      Subjective: Patient denies left shoulder pain prior to today's PT session  Patient reports left shoulder pain when putting on his coat - pain rated 2-3/10 at worst       Objective: See treatment diary below  Assessment: Mild, dull left shoulder pain noted when performing sidelying ER  Manual therapy is tolerated well  Plan: Continue treatment as per PT plan of care         Precautions: chronic L shoulder pain, chronic knee pain      Manuals 11/3  9/20  9/23 9/27 9/30 10/12 10/21 10/25 10/27 11/1   X-friction masssage JLW  CF MM  SZ  MO MO MO kl   prom JLW  CF MM KT SZ kl MO MO nv kl   graston JLW  CF MM KT deltoid insertion SZ kl MO MO MO nv                 Neuro Re-Ed                                                                                                  Ther Ex              Pulleys flex/abd 5"x30 ea  5" x 30 ea  5" x 30 ea 5"x30 5"x30 ea VC's/TC's for tech 5"x30ea 5"x30 ea 5"x30 ea 5"x30  ea 5"x30   sidelying ER 3#    20x 20x        rows black  30    20x blue 30x blue black x30 Black  x30 Black  x30 Black  x30 x30   shoulder ext black  30    20x blue 30x blue black x30 Black  x30 Black   x30 Black  x30 Black  30   Sleeper stretch HEP    30"x3 30"x3 VC's/TC's 30"x3 30"x3 30"x3 30"x3    X-body stretch 30"x3  20" x 3 20"x3 30"x3 30"x3 30"x3 30"x3 30"x3 30"x3 30"x3   D1 ext black  30          black  30   scap punches 3#  30          sidelying 3#2x10                 Ther Activity              ube 4'/4'  4'/4'  4'/4' 4'/4' 4'/4' 4'/4 4'/4' 4'/4' 4'/4' 4'/4'                 Gait Training Modalities

## 2022-11-08 ENCOUNTER — OFFICE VISIT (OUTPATIENT)
Dept: PHYSICAL THERAPY | Facility: CLINIC | Age: 62
End: 2022-11-08

## 2022-11-08 DIAGNOSIS — M25.512 CHRONIC LEFT SHOULDER PAIN: ICD-10-CM

## 2022-11-08 DIAGNOSIS — G89.29 CHRONIC LEFT SHOULDER PAIN: ICD-10-CM

## 2022-11-08 DIAGNOSIS — M24.112 LABRAL TEAR OF SHOULDER, DEGENERATIVE, LEFT: Primary | ICD-10-CM

## 2022-11-08 NOTE — PROGRESS NOTES
Daily Note     Today's date: 2022  Patient name: Christopher James  :   MRN: [id number]  Referring provider: Breanna Enamorado*  Dx:   Encounter Diagnosis     ICD-10-CM    1  Labral tear of shoulder, degenerative, left  M24 112    2  Chronic left shoulder pain  M25 512     G89 29        Start Time: 0930  Stop Time: 1015  Total time in clinic (min): 45 minutes      Subjective: Patient states that he was sore for IASTM LV, but that he notes improvements in (L) shoulder  Objective: See treatment diary below  Assessment: Increased fatigue continues with SL ER and D1 extension  Soreness during manual therapy, but well tolerated  Moderate soft tissue restriction in mid-delt, but improved post manuals  Plan: Continue with PT plan of care         Precautions: chronic L shoulder pain, chronic knee pain      Manuals 11/3 11/8 9/20  9/23 9/27 9/30 10/12 10/21 10/25 10/27 11/1   X-friction masssage JLW LQ CF MM  SZ  MO MO MO kl   prom JLW LQ CF MM KT SZ kl MO MO nv kl   graston JLW LQ CF MM KT deltoid insertion SZ kl MO MO MO nv                 Neuro Re-Ed                                                                                                  Ther Ex              Pulleys flex/abd 5"x30 ea 5"x30 ea 5" x 30 ea  5" x 30 ea 5"x30 5"x30 ea VC's/TC's for tech 5"x30ea 5"x30 ea 5"x30 ea 5"x30  ea 5"x30   sidelying ER 3# x30, 3#   20x 20x        rows black  30 black  30   20x blue 30x blue black x30 Black  x30 Black  x30 Black  x30 x30   shoulder ext black  30 black  30   20x blue 30x blue black x30 Black  x30 Black   x30 Black  x30 Black  30   Sleeper stretch HEP    30"x3 30"x3 VC's/TC's 30"x3 30"x3 30"x3 30"x3    X-body stretch 30"x3 30"x3 20" x 3 20"x3 30"x3 30"x3 30"x3 30"x3 30"x3 30"x3 30"x3   D1 ext black  30 black  30         black  30   scap punches 3#  30 3#  30         sidelying 3#2x10                 Ther Activity              ube 4'/4' 4'/4' 4'/4'  4'/4' 4'/4' 4'/4' 4'/4 4'/4' 4'/4' 4'/4' 4'/4'                 Gait Training                                          Modalities

## 2022-11-11 ENCOUNTER — OFFICE VISIT (OUTPATIENT)
Dept: PHYSICAL THERAPY | Facility: CLINIC | Age: 62
End: 2022-11-11

## 2022-11-11 DIAGNOSIS — M24.112 LABRAL TEAR OF SHOULDER, DEGENERATIVE, LEFT: Primary | ICD-10-CM

## 2022-11-11 DIAGNOSIS — M25.512 CHRONIC LEFT SHOULDER PAIN: ICD-10-CM

## 2022-11-11 DIAGNOSIS — G89.29 CHRONIC LEFT SHOULDER PAIN: ICD-10-CM

## 2022-11-11 NOTE — PROGRESS NOTES
Daily Note     Today's date: 2022  Patient name: Kacie Pierre  :   MRN: [id number]  Referring provider: Melchor Ribeiro*  Dx:   Encounter Diagnosis     ICD-10-CM    1  Labral tear of shoulder, degenerative, left  M24 112    2  Chronic left shoulder pain  M25 512     G89 29                   Subjective: patient reports he is feeling fine today however he notes he has a big bruise on his arm from graston lv  Objective: See treatment diary below      Assessment: Tolerated treatment well  Patient tolerated treatment well  He demonstrates good technique throughout the session with minimal cues required  He demonstrates good tolerance to ROM with no pain noted throughout  Held on graston today secondary to brusing on arm, reintroduce nv as tolerated  He would benefit from continued physical therapy  Plan: Continue per plan of care        Precautions: chronic L shoulder pain, chronic knee pain      Manuals 11/3 11/8 11/11   9/30 10/12 10/21 10/25 10/27 11/1   X-friction masssage JLW LQ SK   SZ  MO MO MO kl   prom JLW LQ SK   SZ kl MO MO nv kl   graston JLW LQ Held, resume nv   SZ kl MO MO MO nv                 Neuro Re-Ed                                                                                                  Ther Ex              Pulleys flex/abd 5"x30 ea 5"x30 ea 5" x30ea   5"x30 ea VC's/TC's for tech 5"x30ea 5"x30 ea 5"x30 ea 5"x30  ea 5"x30   sidelying ER 3# x30, 3# 3# x30   20x        rows black  30 black  30 Black 30   30x blue black x30 Black  x30 Black  x30 Black  x30 x30   shoulder ext black  30 black  30 Black  30   30x blue black x30 Black  x30 Black   x30 Black  x30 Black  30   Sleeper stretch HEP     30"x3 VC's/TC's 30"x3 30"x3 30"x3 30"x3    X-body stretch 30"x3 30"x3 30"x3   30"x3 30"x3 30"x3 30"x3 30"x3 30"x3   D1 ext black  30 black  30 Black 30        black  30   scap punches 3#  30 3#  30  3# x30        sidelying 3#2x10                 Ther Activity roque 4'/4' 4'/4' 4'/4'   4'/4' 4'/4 4'/4' 4'/4' 4'/4' 4'/4'                 Gait Training                                          Modalities

## 2022-11-16 ENCOUNTER — OFFICE VISIT (OUTPATIENT)
Dept: PHYSICAL THERAPY | Facility: CLINIC | Age: 62
End: 2022-11-16

## 2022-11-16 DIAGNOSIS — M25.512 CHRONIC LEFT SHOULDER PAIN: ICD-10-CM

## 2022-11-16 DIAGNOSIS — G89.29 CHRONIC LEFT SHOULDER PAIN: ICD-10-CM

## 2022-11-16 DIAGNOSIS — M24.112 LABRAL TEAR OF SHOULDER, DEGENERATIVE, LEFT: Primary | ICD-10-CM

## 2022-11-16 NOTE — PROGRESS NOTES
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  Objective: See treatment diary below    Assessment: Mild discomfort/fatigue during D1 ext TB exercise  Modified reps, 2* fatigue  Able to to x-friction massage and GT  Comfortable throughout PROM to L shoulder  Will monitor  Plan: Cont per POC       Precautions: chronic L shoulder pain, chronic knee pain      Manuals 11/3 11/8 11/11 11/16  9/30 10/12 10/21 10/25 10/27 11/1   X-friction masssage JLW LQ SK MO  SZ  MO MO MO kl   prom JLW LQ SK MO  SZ kl MO MO nv kl   graston JLW LQ Held, resume nv MO  SZ kl MO MO MO nv                 Neuro Re-Ed                                                                                                  Ther Ex              Pulleys flex/abd 5"x30 ea 5"x30 ea 5" x30ea 5"x30 ea  5"x30 ea VC's/TC's for tech 5"x30ea 5"x30 ea 5"x30 ea 5"x30  ea 5"x30   sidelying ER 3# x30, 3# 3# x30 3#   2x15  20x        rows black  30 black  30 Black 30 Black  30  30x blue black x30 Black  x30 Black  x30 Black  x30 x30   shoulder ext black  30 black  30 Black  30 Black  30  30x blue black x30 Black  x30 Black   x30 Black  x30 Black  30   Sleeper stretch HEP     30"x3 VC's/TC's 30"x3 30"x3 30"x3 30"x3    X-body stretch 30"x3 30"x3 30"x3 30"x3  30"x3 30"x3 30"x3 30"x3 30"x3 30"x3   D1 ext black  30 black  30 Black 30 Black  2x15       black  30   scap punches 3#  30 3#  30  3# x30 3# x30       sidelying 3#2x10                 Ther Activity              ube 4'/4' 4'/4' 4'/4' 4'/4'  4'/4' 4'/4 4'/4' 4'/4' 4'/4' 4'/4'                 Gait Training Modalities

## 2022-11-18 ENCOUNTER — OFFICE VISIT (OUTPATIENT)
Dept: PHYSICAL THERAPY | Facility: CLINIC | Age: 62
End: 2022-11-18

## 2022-11-18 DIAGNOSIS — M24.112 LABRAL TEAR OF SHOULDER, DEGENERATIVE, LEFT: Primary | ICD-10-CM

## 2022-11-18 DIAGNOSIS — M25.512 CHRONIC LEFT SHOULDER PAIN: ICD-10-CM

## 2022-11-18 DIAGNOSIS — G89.29 CHRONIC LEFT SHOULDER PAIN: ICD-10-CM

## 2022-11-23 ENCOUNTER — OFFICE VISIT (OUTPATIENT)
Dept: PHYSICAL THERAPY | Facility: CLINIC | Age: 62
End: 2022-11-23

## 2022-11-23 DIAGNOSIS — M25.512 CHRONIC LEFT SHOULDER PAIN: ICD-10-CM

## 2022-11-23 DIAGNOSIS — G89.29 CHRONIC LEFT SHOULDER PAIN: ICD-10-CM

## 2022-11-23 DIAGNOSIS — M24.112 LABRAL TEAR OF SHOULDER, DEGENERATIVE, LEFT: Primary | ICD-10-CM

## 2022-11-23 NOTE — PROGRESS NOTES
Daily Note/DC Summary     Today's date: 2022  Patient name: Lisa Jett  : [id number]  MRN: [id number]  Referring provider: Chelo Thomas*  Dx:   Encounter Diagnosis     ICD-10-CM    1  Labral tear of shoulder, degenerative, left  M24 112       2  Chronic left shoulder pain  M25 512     G89 29           Start Time: 1130  Stop Time: 1205  Total time in clinic (min): 35 minutes      Subjective: Patient reports his left shoulder is feeling better  Objective: See treatment diary below  Assessment: Patient is doing well with therapeutic exercise program   Right shoulder ROM and strength are WNLs  Plan: Discharge from outpatient PT         Precautions: chronic L shoulder pain, chronic knee pain      Manuals 11/3 11/8 11/11 11/16 11/18 11/23  10/21 10/25 10/27 11/1   X-friction masssage JLW LQ SK MO    MO MO MO kl   prom JLW LQ SK MO    MO MO nv kl   graston JLW LQ Held, resume nv MO    MO MO MO nv                 Neuro Re-Ed                                                                                                  Ther Ex              Pulleys flex/abd 5"x30 ea 5"x30 ea 5" x30ea 5"x30 ea 5"x30ea NP  5"x30 ea 5"x30 ea 5"x30  ea 5"x30   sidelying ER 3# x30, 3# 3# x30 3#   2x15 3# 1x17,  1x13 3#  3x10        rows black  30 black  30 Black 30 Black  30 Black  30 black  30  Black  x30 Black  x30 Black  x30 x30   shoulder ext black  30 black  30 Black  30 Black  30 Black  30 black  30  Black  x30 Black   x30 Black  x30 Black  30   Sleeper stretch HEP       30"x3 30"x3 30"x3    X-body stretch 30"x3 30"x3 30"x3 30"x3 30"x3   30"x3 30"x3 30"x3 30"x3   D1 ext black  30 black  30 Black 30 Black  2x15 Black  30 black  30     black  30   scap punches 3#  30 3#  30  3# x30 3# x30 3#x30  5#   30     sidelying 3#2x10                 Ther Activity              ube 4'/4' 4'/4' 4'/4' 4'/4' 4'/4' 4'/4'  lvl 3  4'/4' 4'/4' 4'/4' 4'/4'                 Gait Training Modalities

## 2023-01-15 DIAGNOSIS — F41.9 ANXIETY: ICD-10-CM

## 2023-01-15 RX ORDER — ESCITALOPRAM OXALATE 20 MG/1
TABLET ORAL
Qty: 90 TABLET | Refills: 1 | Status: SHIPPED | OUTPATIENT
Start: 2023-01-15

## 2023-01-21 DIAGNOSIS — E78.5 HYPERLIPIDEMIA, UNSPECIFIED HYPERLIPIDEMIA TYPE: ICD-10-CM

## 2023-01-21 RX ORDER — ROSUVASTATIN CALCIUM 10 MG/1
TABLET, COATED ORAL
Qty: 90 TABLET | Refills: 0 | Status: SHIPPED | OUTPATIENT
Start: 2023-01-21

## 2023-02-19 DIAGNOSIS — I10 ESSENTIAL HYPERTENSION: ICD-10-CM

## 2023-02-19 RX ORDER — AMLODIPINE BESYLATE 5 MG/1
TABLET ORAL
Qty: 180 TABLET | Refills: 1 | Status: SHIPPED | OUTPATIENT
Start: 2023-02-19

## 2023-05-02 ENCOUNTER — OFFICE VISIT (OUTPATIENT)
Dept: INTERNAL MEDICINE CLINIC | Facility: CLINIC | Age: 63
End: 2023-05-02

## 2023-05-02 VITALS
HEIGHT: 76 IN | RESPIRATION RATE: 16 BRPM | OXYGEN SATURATION: 96 % | DIASTOLIC BLOOD PRESSURE: 82 MMHG | WEIGHT: 237 LBS | SYSTOLIC BLOOD PRESSURE: 130 MMHG | HEART RATE: 77 BPM | BODY MASS INDEX: 28.86 KG/M2

## 2023-05-02 DIAGNOSIS — R73.01 ELEVATED FASTING BLOOD SUGAR: ICD-10-CM

## 2023-05-02 DIAGNOSIS — I10 ESSENTIAL (PRIMARY) HYPERTENSION: ICD-10-CM

## 2023-05-02 DIAGNOSIS — E66.3 OVERWEIGHT (BMI 25.0-29.9): ICD-10-CM

## 2023-05-02 DIAGNOSIS — F41.9 ANXIETY: ICD-10-CM

## 2023-05-02 DIAGNOSIS — E78.5 HYPERLIPIDEMIA, UNSPECIFIED HYPERLIPIDEMIA TYPE: Primary | ICD-10-CM

## 2023-05-02 NOTE — ASSESSMENT & PLAN NOTE
Clinically stable and doing well continue the current medical regiment will continue monitor    Continue Lexapro 20 mg once daily

## 2023-05-02 NOTE — ASSESSMENT & PLAN NOTE
Hyperlipidemia controlled continue with current medical regiment recommend a low-cholesterol diet and recommend routine exercise we will continue to monitor the progress    Continue Crestor 10 mg once daily

## 2023-05-02 NOTE — PROGRESS NOTES
Assessment/Plan:    Essential (primary) hypertension  Hypertension - controlled, I have counseled patient following healthy balance diet, I would like the patient reduce sodium, exercise routinely, I would like the patient continued the [facility] current medical regiment and we will continue to monitor  Elevated fasting blood sugar  Reduce carbohydrates/sweets routine walking continue monitor hemoglobin A1c and fasting blood sugar    Hyperlipidemia  Hyperlipidemia controlled continue with current medical regiment recommend a low-cholesterol diet and recommend routine exercise we will continue to monitor the progress  Continue Crestor 10 mg once daily    Overweight (BMI 25 0-29  9)  I have counselled the pt to follow a healthy and balanced diet ,and recommend routine exercise  Anxiety  Clinically stable and doing well continue the current medical regiment will continue monitor  Continue Lexapro 20 mg once daily         Problem List Items Addressed This Visit        Cardiovascular and Mediastinum    Essential (primary) hypertension     Hypertension - controlled, I have counseled patient following healthy balance diet, I would like the patient reduce sodium, exercise routinely, I would like the patient continued the [facility] current medical regiment and we will continue to monitor  Other    Anxiety     Clinically stable and doing well continue the current medical regiment will continue monitor  Continue Lexapro 20 mg once daily         Hyperlipidemia - Primary     Hyperlipidemia controlled continue with current medical regiment recommend a low-cholesterol diet and recommend routine exercise we will continue to monitor the progress    Continue Crestor 10 mg once daily         Relevant Orders    Comprehensive metabolic panel    Lipid Panel with Direct LDL reflex    Elevated fasting blood sugar     Reduce carbohydrates/sweets routine walking continue monitor hemoglobin A1c and fasting blood sugar         Relevant Orders    Hemoglobin A1C    Overweight (BMI 25 0-29  9)     I have counselled the pt to follow a healthy and balanced diet ,and recommend routine exercise  Return to office 6  months  call if any problems  Subjective:      Patient ID: Vicente Zimmerman is a 57 y o  male  HPI 60-year old male coming in for a follow up visit regarding hyperlipidemia, elevated fasting blood sugar, anxiety, essential hypertension and overweight; the patient reports me compliant taking medications without untoward side effects the  The patient is here to review his medical condition, update me on the medical condition and the patient reports me no hospitalizations and no ER visits  No injuries no illnesses overall doing very well he has not been walking as much because of rainy weather but plans to go for walking with his wife and dog  Here to review laboratories in detail and overall he is doing very well  Reports me anxiety is stable with Lexapro anxiety, walking out door , now out side    The following portions of the patient's history were reviewed and updated as appropriate: allergies, current medications, past family history, past medical history, past social history, past surgical history and problem list     Review of Systems   Constitutional: Negative for activity change, appetite change and unexpected weight change  HENT: Negative for congestion  Eyes: Negative for visual disturbance  Respiratory: Negative for cough and shortness of breath  Cardiovascular: Negative for chest pain  Gastrointestinal: Negative for abdominal pain, diarrhea, nausea and vomiting  Neurological: Negative for dizziness, light-headedness and headaches  Psychiatric/Behavioral: Negative for suicidal ideas  The patient is nervous/anxious  Objective:    Return in about 6 months (around 11/2/2023)  No results found        Allergies   Allergen Reactions    Bee Venom Edema    Celecoxib Hives     Reaction Date: [id number]; Category: Allergy; Past Medical History:   Diagnosis Date    Cancer [facility] [address] [facility]     COVID-19     Hyperlipidemia     Malignant melanoma of skin ([facility] [address] )     last assessed 10/25/17, resolved 4/20/15     Past Surgical History:   Procedure Laterality Date    COLONOSCOPY  2012    Dr Marvin Selvin   Impression 1/12/15    HEMORROIDECTOMY      SKIN LESION EXCISION Right     Elbow     TONSILECTOMY AND ADNOIDECTOMY       Current Outpatient Medications on File Prior to Visit   Medication Sig Dispense Refill    amLODIPine (NORVASC) 5 mg tablet TAKE 2 TABLETS BY MOUTH EVERY MORNING 180 tablet 1    Ascorbic Acid (VITAMIN C) 1000 MG tablet Take 1 tablet by mouth daily      escitalopram (LEXAPRO) 20 mg tablet TAKE 1 TABLET BY MOUTH EVERY DAY 90 tablet 1    rosuvastatin (CRESTOR) 10 MG tablet TAKE 1 TABLET BY MOUTH EVERY DAY 90 tablet 0    EPINEPHrine (EpiPen 2-Sherman) 0 3 mg/0 3 mL SOAJ Inject 0 3 mL (0 3 mg total) into a muscle once for 1 dose (Patient not taking: Reported on 4/15/2022 ) 1 each 1     No current facility-administered medications on file prior to visit       Family History   Problem Relation Age of Onset    Diabetes Mother     Skin cancer Mother     Coronary artery disease Father      Social History     Socioeconomic History    Marital status: /Civil Union     Spouse name: Not on file    Number of children: Not on file    Years of education: Not on file    Highest education level: Not on file   Occupational History    Not on file   Tobacco Use    Smoking status: Never    Smokeless tobacco: Never   Vaping Use    Vaping Use: Never used   Substance and Sexual Activity    Alcohol use: Yes     Comment: Social     Drug use: No    Sexual activity: Yes   Other Topics Concern    Not on file   Social History Narrative    Not on file     Social Determinants of Health     Financial Resource Strain: Not on file   Food Insecurity: Not on file   Transportation Needs: Not on file   Physical "Activity: Not on file   Stress: Not on file   Social Connections: Not on file   Intimate Partner Violence: Not on file   Housing Stability: Not on file     Vitals:    05/02/23 0826   BP: 130/82   Pulse: 77   Resp: 16   SpO2: 96%   Weight: 108 kg (237 lb)   Height: 6' 4\" (1 93 m)     Results for orders placed or performed in visit on 04/18/23   Comprehensive metabolic panel   Result Value Ref Range    Sodium 136 135 - 147 mmol/L    Potassium 4 7 3 5 - 5 3 mmol/L    Chloride 107 96 - 108 mmol/L    CO2 25 21 - 32 mmol/L    ANION GAP 4 4 - 13 mmol/L    BUN 25 5 - 25 mg/dL    Creatinine 1 02 0 60 - 1 30 mg/dL    Glucose, Fasting 107 (H) 65 - 99 mg/dL    Calcium 9 1 8 3 - 10 1 mg/dL    AST 30 5 - 45 U/L    ALT 43 12 - 78 U/L    Alkaline Phosphatase 73 46 - 116 U/L    Total Protein 7 4 6 4 - 8 4 g/dL    Albumin 4 0 3 5 - 5 0 g/dL    Total Bilirubin 1 99 (H) 0 20 - 1 00 mg/dL    eGFR 77 ml/min/1 73sq m   Lipid Panel with Direct LDL reflex   Result Value Ref Range    Cholesterol 165 See Comment mg/dL    Triglycerides 78 See Comment mg/dL    HDL, Direct 53 >=40 mg/dL    LDL Calculated 96 0 - 100 mg/dL     Weight (last 2 days)     Date/Time Weight    05/02/23 0826 108 (237)        Body mass index is 28 85 kg/m²  BP      Temp      Pulse     Resp      SpO2        Vitals:    05/02/23 0826   Weight: 108 kg (237 lb)     Vitals:    05/02/23 0826   Weight: 108 kg (237 lb)       /82   Pulse 77   Resp 16   Ht 6' 4\" (1 93 m)   Wt 108 kg (237 lb)   SpO2 96%   BMI 28 85 kg/m²          Physical Exam  Vitals and nursing note reviewed  Constitutional:       General: He is not in acute distress  Appearance: Normal appearance  He is well-developed  He is not ill-appearing, toxic-appearing or diaphoretic  HENT:      Head: Normocephalic and atraumatic  Right Ear: External ear normal       Left Ear: External ear normal    Eyes:      General: No scleral icterus  Right eye: No discharge           Left eye: No " discharge  Conjunctiva/sclera: Conjunctivae normal       Pupils: Pupils are equal, round, and reactive to light  Cardiovascular:      Rate and Rhythm: Normal rate and regular rhythm  Heart sounds: Normal heart sounds  No murmur heard  No friction rub  No gallop  Pulmonary:      Effort: No respiratory distress  Breath sounds: No wheezing or rales  Abdominal:      General: Bowel sounds are normal  There is no distension  Palpations: Abdomen is soft  There is no mass  Tenderness: There is no abdominal tenderness  There is no guarding or rebound  Musculoskeletal:         General: No deformity  Cervical back: Neck supple  Lymphadenopathy:      Cervical: No cervical adenopathy  Neurological:      Mental Status: He is alert  Psychiatric:         Mood and Affect: Mood is not anxious or depressed  Thought Content: Thought content does not include suicidal ideation

## 2023-05-03 ENCOUNTER — TELEPHONE (OUTPATIENT)
Dept: OTHER | Facility: OTHER | Age: 63
End: 2023-05-03

## 2023-05-04 ENCOUNTER — TELEPHONE (OUTPATIENT)
Dept: DERMATOLOGY | Facility: CLINIC | Age: 63
End: 2023-05-04

## 2023-05-04 NOTE — TELEPHONE ENCOUNTER
Patient signed release of health to request prior records from [facility]  Faxed request and scanned confirmation into chart

## 2023-07-11 ENCOUNTER — OFFICE VISIT (OUTPATIENT)
Dept: DERMATOLOGY | Facility: CLINIC | Age: 63
End: 2023-07-11
Payer: COMMERCIAL

## 2023-07-11 VITALS — BODY MASS INDEX: 28.15 KG/M2 | TEMPERATURE: 97.6 F | HEIGHT: 76 IN | WEIGHT: 231.19 LBS

## 2023-07-11 DIAGNOSIS — L82.1 SEBORRHEIC KERATOSIS: ICD-10-CM

## 2023-07-11 DIAGNOSIS — D22.9 MULTIPLE MELANOCYTIC NEVI: ICD-10-CM

## 2023-07-11 DIAGNOSIS — Z85.820 HISTORY OF MELANOMA: Primary | ICD-10-CM

## 2023-07-11 PROCEDURE — 99203 OFFICE O/P NEW LOW 30 MIN: CPT | Performed by: STUDENT IN AN ORGANIZED HEALTH CARE EDUCATION/TRAINING PROGRAM

## 2023-07-11 NOTE — PROGRESS NOTES
[facility] Note     Patient Name: Nyasia Seth  Encounter Date: 07/11/2023     Have you been cared for by a [facility] in the last 3 years and, if so, which description applies to you? NO. I am considered a "new" patient and must complete all patient intake questions. I am MALE/not capable of bearing children. REVIEW OF SYSTEMS:  Have you recently had or currently have any of the following? · Recent fever or chills? No  · Any non-healing wound? No   PAST MEDICAL HISTORY:  Have you personally ever had or currently have any of the following? If "YES," then please provide more detail. · Skin cancer (such as Melanoma, Basal Cell Carcinoma, Squamous Cell Carcinoma? YES, Melanoma on right arm  · Tuberculosis, HIV/AIDS, Hepatitis B or C: No  · Systemic Immunosuppression such as Diabetes, Biologic or Immunotherapy, Chemotherapy, Organ Transplantation, Bone Marrow Transplantation No  · Radiation Treatment No   FAMILY HISTORY:  Any "first degree relatives" (parent, brother, sister, or child) with the following? • Skin Cancer, Pancreatic or Other Cancer? No   PATIENT EXPERIENCE:    • Do you want the Dermatologist to perform a COMPLETE skin exam today including a clinical examination under the "bra and underwear" areas? Yes  • If necessary, do we have your permission to call and leave a detailed message on your Preferred Phone number that includes your specific medical information? Yes      Allergies   Allergen Reactions   • Bee Venom Edema   • Celecoxib Hives     Reaction Date: 21Nov2011; Category:  Allergy;       Current Outpatient Medications:   •  amLODIPine (NORVASC) 5 mg tablet, TAKE 2 TABLETS BY MOUTH EVERY MORNING, Disp: 180 tablet, Rfl: 1  •  Ascorbic Acid (VITAMIN C) 1000 MG tablet, Take 1 tablet by mouth daily, Disp: , Rfl:   •  EPINEPHrine (EpiPen 2-Sherman) 0.3 mg/0.3 mL SOAJ, Inject 0.3 mL (0.3 mg total) into a muscle once for 1 dose (Patient not taking: Reported on 4/15/2022 ), Disp: 1 each, Rfl: 1  •  escitalopram (LEXAPRO) 20 mg tablet, TAKE 1 TABLET BY MOUTH EVERY DAY, Disp: 90 tablet, Rfl: 1  •  rosuvastatin (CRESTOR) 10 MG tablet, TAKE 1 TABLET BY MOUTH EVERY DAY, Disp: 90 tablet, Rfl: 0          • Whom besides the patient is providing clinical information about today's encounter?   o NO ADDITIONAL HISTORIAN (patient alone provided history)    Physical Exam and Assessment/Plan by Diagnosis:    HISTORY OF MELANOMA    Physical Exam:  • Anatomic Location Affected:  Right forearm/elbow  • Morphological Description of Scar:  Well healed scar  • Year Treated: Sept 2012  • TNM Classification: not known, but per history, supeficial with lymph node dissection needed  • Suspected Recurrence: no      Additional History of Present Condition:  Dr Gretchen Neil did the excision in 2012. Family history of [address] with mom. Assessment and Plan:  Based on a thorough discussion of this condition and the management approach to it (including a comprehensive discussion of the known risks, side effects and potential benefits of treatment), the patient (family) agrees to implement the following specific plan:  • When outside we recommend using a wide brim hat, sunglasses, long sleeve and pants, sunscreen with SPF 91+ with reapplication every 2 hours, or SPF specific clothing   • Monitor for any changes    What happens at follow-up? The main purpose of follow-up is to detect recurrences early (metastatic melanoma), but it also offers an opportunity to diagnose a new primary melanoma at the first possible opportunity. A second invasive melanoma occurs in 5-10% of melanoma patients and a new melanoma in situ is diagnosed in more than 20% of melanoma patients. Our practice makes the following recommendations for follow-up for patients with invasive melanoma.   • At-least "monthly" self-skin examinations   • Routine skin checks by a board certified dermatologist  • Follow-up intervals are "every 3 months" within 2 years of a new melanoma diagnosis; "every 6 months" between 2-4 years of a new melanoma diagnosis; and "annually" after 4 years of a new melanoma diagnosis  • Individual patient's needs should be considered before an appropriate follow-up is offered  • Provide education and support to help the patient adjust to their illness    Follow-up appointments should include:  • A check of the scar where the primary melanoma was removed  • Checking the regional lymph nodes  • A general skin examination  • A full physical examination at least annually by your primary care physician    In those with more advanced primary disease, follow-up may include:  • Blood tests  • Imaging: ultrasound, X-ray, CT, MRI and PET scan. Most tests are not worthwhile for patients with stage 1 or 2 melanoma unless there are signs or symptoms of disease recurrence or metastasis. No tests are necessary for healthy patients who have remained well for five years or longer after removal of their melanoma. What is the outlook for patients with melanoma? • Melanoma in situ is cured by excision because it has no potential to spread around the body. • The risk of spread and ultimate death from invasive melanoma depends on several factors, but the main one is the Breslow thickness of the melanoma at the time it was surgically removed. • Metastases are rare for melanomas < 0.75 mm and the risk for tumours 0.75-1 mm thick is about 5%. The risk steadily increases with thickness so that melanomas > 4 mm have a risk of metastasis of about 40%. Melanoma is a potentially serious type of skin cancer, in which there is uncontrolled growth of melanocytes (pigment cells). Melanoma is sometimes called malignant melanoma. Normal melanocytes are found in the basal layer of the epidermis (the outer layer of skin). Melanocytes produce a protein called melanin, which protects skin cells by absorbing ultraviolet (UV) radiation.  Melanocytes are found in equal numbers in black and white skin, but melanocytes in black skin produce much more melanin. People with dark brown or black skin are very much less likely to be damaged by UV radiation than those with white skin. MELANOCYTIC NEVI ("Moles")    Physical Exam:  • Anatomic Location Affected:   Mostly on sun-exposed areas of the trunk and extremities  • Morphological Description:  Scattered, 1-4mm round to ovoid, symmetrical-appearing, even bordered, skin colored to dark brown macules/papules, mostly in sun-exposed areas  • Pertinent Positives:  • Pertinent Negatives: Additional History of Present Condition:      Assessment and Plan:  Based on a thorough discussion of this condition and the management approach to it (including a comprehensive discussion of the known risks, side effects and potential benefits of treatment), the patient (family) agrees to implement the following specific plan:  • When outside we recommend using a wide brim hat, sunglasses, long sleeve and pants, sunscreen with SPF 52+ with reapplication every 2 hours, or SPF specific clothing   • Benign, reassured  • Annual skin check     Melanocytic Nevi  Melanocytic nevi ("moles") are tan or brown, raised or flat areas of the skin which have an increased number of melanocytes. Melanocytes are the cells in our body which make pigment and account for skin color. Some moles are present at birth (I.e., "congenital nevi"), while others come up later in life (i.e., "acquired nevi"). The sun can stimulate the body to make more moles. Sunburns are not the only thing that triggers more moles. Chronic sun exposure can do it too. Clinically distinguishing a healthy mole from melanoma may be difficult, even for experienced dermatologists. The "ABCDE's" of moles have been suggested as a means of helping to alert a person to a suspicious mole and the possible increased risk of melanoma.   The suggestions for raising alert are as follows:    Asymmetry: Healthy moles tend to be symmetric, while melanomas are often asymmetric. Asymmetry means if you draw a line through the mole, the two halves do not match in color, size, shape, or surface texture. Asymmetry can be a result of rapid enlargement of a mole, the development of a raised area on a previously flat lesion, scaling, ulceration, bleeding or scabbing within the mole. Any mole that starts to demonstrate "asymmetry" should be examined promptly by a board certified dermatologist.     Border: Healthy moles tend to have discrete, even borders. The border of a melanoma often blends into the normal skin and does not sharply delineate the mole from normal skin. Any mole that starts to demonstrate "uneven borders" should be examined promptly by a board certified dermatologist.     Color: Healthy moles tend to be one color throughout. Melanomas tend to be made up of different colors ranging from dark black, blue, white, or red. Any mole that demonstrates a color change should be examined promptly by a board certified dermatologist.     Diameter: Healthy moles tend to be smaller than 0.6 cm in size; an exception are "congenital nevi" that can be larger. Melanomas tend to grow and can often be greater than 0.6 cm (1/4 of an inch, or the size of a pencil eraser). This is only a guideline, and many normal moles may be larger than 0.6 cm without being unhealthy. Any mole that starts to change in size (small to bigger or bigger to smaller) should be examined promptly by a board certified dermatologist.     Evolving: Healthy moles tend to "stay the same."  Melanomas may often show signs of change or evolution such as a change in size, shape, color, or elevation.   Any mole that starts to itch, bleed, crust, burn, hurt, or ulcerate or demonstrate a change or evolution should be examined promptly by a board certified dermatologist.        Mckenzie Toure; NON-INFLAMED    Physical Exam:  • Anatomic Location Affected:  trunk  • Morphological Description:  Flat and raised, waxy, smooth to warty textured, yellow to brownish-grey to dark brown to blackish, discrete, "stuck-on" appearing papules. • Pertinent Positives:  • Pertinent Negatives: Additional History of Present Condition:      Assessment and Plan:  Based on a thorough discussion of this condition and the management approach to it (including a comprehensive discussion of the known risks, side effects and potential benefits of treatment), the patient (family) agrees to implement the following specific plan:  • Monitor for changes  • Benign, reassured  •     Seborrheic Keratosis  A seborrheic keratosis is a harmless warty spot that appears during adult life as a common sign of skin aging. Seborrheic keratoses can arise on any area of skin, covered or uncovered, with the usual exception of the palms and soles. They do not arise from mucous membranes. Seborrheic keratoses can have highly variable appearance. Seborrheic keratoses are extremely common. It has been estimated that over 90% of adults over the age of 61 years have one or more of them. They occur in males and females of all races, typically beginning to erupt in the 35s or 45s. They are uncommon under the age of 21 years. The precise cause of seborrhoeic keratoses is not known. Seborrhoeic keratoses are considered degenerative in nature. As time goes by, seborrheic keratoses tend to become more numerous. Some people inherit a tendency to develop a very large number of them; some people may have hundreds of them. There is no easy way to remove multiple lesions on a single occasion. Unless a specific lesion is "inflamed" and is causing pain or stinging/burning or is bleeding, most insurance companies do not authorize treatment.     Scribe Attestation    I,:  Vincenzo Rodrigez am acting as a scribe while in the presence of the attending physician.:       I,:  True Precise Haim Fournier MD personally performed the services described in this documentation    as scribed in my presence.:

## 2023-07-11 NOTE — PATIENT INSTRUCTIONS
HISTORY OF MELANOMA    Assessment and Plan:  Based on a thorough discussion of this condition and the management approach to it (including a comprehensive discussion of the known risks, side effects and potential benefits of treatment), the patient (family) agrees to implement the following specific plan:  When outside we recommend using a wide brim hat, sunglasses, long sleeve and pants, sunscreen with SPF 98+ with reapplication every 2 hours, or SPF specific clothing   Monitor for any changes    What happens at follow-up? The main purpose of follow-up is to detect recurrences early (metastatic melanoma), but it also offers an opportunity to diagnose a new primary melanoma at the first possible opportunity. A second invasive melanoma occurs in 5-10% of melanoma patients and a new melanoma in situ is diagnosed in more than 20% of melanoma patients. Our practice makes the following recommendations for follow-up for patients with invasive melanoma. At-least "monthly" self-skin examinations   Routine skin checks by a board certified dermatologist  Follow-up intervals are "every 3 months" within 2 years of a new melanoma diagnosis; "every 6 months" between 2-4 years of a new melanoma diagnosis; and "annually" after 4 years of a new melanoma diagnosis  Individual patient's needs should be considered before an appropriate follow-up is offered  Provide education and support to help the patient adjust to their illness    Follow-up appointments should include:  A check of the scar where the primary melanoma was removed  Checking the regional lymph nodes  A general skin examination  A full physical examination at least annually by your primary care physician    In those with more advanced primary disease, follow-up may include:  Blood tests  Imaging: ultrasound, X-ray, CT, MRI and PET scan.     Most tests are not worthwhile for patients with stage 1 or 2 melanoma unless there are signs or symptoms of disease recurrence or metastasis. No tests are necessary for healthy patients who have remained well for five years or longer after removal of their melanoma. What is the outlook for patients with melanoma? Melanoma in situ is cured by excision because it has no potential to spread around the body. The risk of spread and ultimate death from invasive melanoma depends on several factors, but the main one is the Breslow thickness of the melanoma at the time it was surgically removed. Metastases are rare for melanomas < 0.75 mm and the risk for tumours 0.75-1 mm thick is about 5%. The risk steadily increases with thickness so that melanomas > 4 mm have a risk of metastasis of about 40%. Melanoma is a potentially serious type of skin cancer, in which there is uncontrolled growth of melanocytes (pigment cells). Melanoma is sometimes called malignant melanoma. Normal melanocytes are found in the basal layer of the epidermis (the outer layer of skin). Melanocytes produce a protein called melanin, which protects skin cells by absorbing ultraviolet (UV) radiation. Melanocytes are found in equal numbers in black and white skin, but melanocytes in black skin produce much more melanin. People with dark brown or black skin are very much less likely to be damaged by UV radiation than those with white skin.     MELANOCYTIC NEVI ("Moles")    Assessment and Plan:  Based on a thorough discussion of this condition and the management approach to it (including a comprehensive discussion of the known risks, side effects and potential benefits of treatment), the patient (family) agrees to implement the following specific plan:  When outside we recommend using a wide brim hat, sunglasses, long sleeve and pants, sunscreen with SPF 30+ with reapplication every 2 hours, or SPF specific clothing   Benign, reassured  Annual skin check     Melanocytic Nevi  Melanocytic nevi ("moles") are tan or brown, raised or flat areas of the skin which have an increased number of melanocytes. Melanocytes are the cells in our body which make pigment and account for skin color. Some moles are present at birth (I.e., "congenital nevi"), while others come up later in life (i.e., "acquired nevi"). The sun can stimulate the body to make more moles. Sunburns are not the only thing that triggers more moles. Chronic sun exposure can do it too. Clinically distinguishing a healthy mole from melanoma may be difficult, even for experienced dermatologists. The "ABCDE's" of moles have been suggested as a means of helping to alert a person to a suspicious mole and the possible increased risk of melanoma. The suggestions for raising alert are as follows:    Asymmetry: Healthy moles tend to be symmetric, while melanomas are often asymmetric. Asymmetry means if you draw a line through the mole, the two halves do not match in color, size, shape, or surface texture. Asymmetry can be a result of rapid enlargement of a mole, the development of a raised area on a previously flat lesion, scaling, ulceration, bleeding or scabbing within the mole. Any mole that starts to demonstrate "asymmetry" should be examined promptly by a board certified dermatologist.     Border: Healthy moles tend to have discrete, even borders. The border of a melanoma often blends into the normal skin and does not sharply delineate the mole from normal skin. Any mole that starts to demonstrate "uneven borders" should be examined promptly by a board certified dermatologist.     Color: Healthy moles tend to be one color throughout. Melanomas tend to be made up of different colors ranging from dark black, blue, white, or red. Any mole that demonstrates a color change should be examined promptly by a board certified dermatologist.     Diameter: Healthy moles tend to be smaller than 0.6 cm in size; an exception are "congenital nevi" that can be larger.   Melanomas tend to grow and can often be greater than 0.6 cm (1/4 of an inch, or the size of a pencil eraser). This is only a guideline, and many normal moles may be larger than 0.6 cm without being unhealthy. Any mole that starts to change in size (small to bigger or bigger to smaller) should be examined promptly by a board certified dermatologist.     Evolving: Healthy moles tend to "stay the same."  Melanomas may often show signs of change or evolution such as a change in size, shape, color, or elevation. Any mole that starts to itch, bleed, crust, burn, hurt, or ulcerate or demonstrate a change or evolution should be examined promptly by a board certified dermatologist.        Marie Ill; NON-INFLAMED    Assessment and Plan:  Based on a thorough discussion of this condition and the management approach to it (including a comprehensive discussion of the known risks, side effects and potential benefits of treatment), the patient (family) agrees to implement the following specific plan:  Monitor for changes  Benign, reassured      Seborrheic Keratosis  A seborrheic keratosis is a harmless warty spot that appears during adult life as a common sign of skin aging. Seborrheic keratoses can arise on any area of skin, covered or uncovered, with the usual exception of the palms and soles. They do not arise from mucous membranes. Seborrheic keratoses can have highly variable appearance. Seborrheic keratoses are extremely common. It has been estimated that over 90% of adults over the age of 61 years have one or more of them. They occur in males and females of all races, typically beginning to erupt in the 35s or 45s. They are uncommon under the age of 21 years. The precise cause of seborrhoeic keratoses is not known. Seborrhoeic keratoses are considered degenerative in nature. As time goes by, seborrheic keratoses tend to become more numerous. Some people inherit a tendency to develop a very large number of them; some people may have hundreds of them.       There is no easy way to remove multiple lesions on a single occasion. Unless a specific lesion is "inflamed" and is causing pain or stinging/burning or is bleeding, most insurance companies do not authorize treatment.

## 2023-07-15 DIAGNOSIS — F41.9 ANXIETY: ICD-10-CM

## 2023-07-15 RX ORDER — ESCITALOPRAM OXALATE 20 MG/1
TABLET ORAL
Qty: 90 TABLET | Refills: 1 | Status: SHIPPED | OUTPATIENT
Start: 2023-07-15

## 2023-07-21 DIAGNOSIS — E78.5 HYPERLIPIDEMIA, UNSPECIFIED HYPERLIPIDEMIA TYPE: ICD-10-CM

## 2023-07-21 RX ORDER — ROSUVASTATIN CALCIUM 10 MG/1
TABLET, COATED ORAL
Qty: 90 TABLET | Refills: 0 | Status: SHIPPED | OUTPATIENT
Start: 2023-07-21

## 2023-08-09 ENCOUNTER — OFFICE VISIT (OUTPATIENT)
Dept: URGENT CARE | Facility: CLINIC | Age: 63
End: 2023-08-09
Payer: MEDICARE

## 2023-08-09 VITALS
RESPIRATION RATE: 18 BRPM | OXYGEN SATURATION: 96 % | BODY MASS INDEX: 28.74 KG/M2 | HEIGHT: 76 IN | TEMPERATURE: 97.1 F | WEIGHT: 236 LBS | DIASTOLIC BLOOD PRESSURE: 78 MMHG | SYSTOLIC BLOOD PRESSURE: 122 MMHG | HEART RATE: 85 BPM

## 2023-08-09 DIAGNOSIS — L08.9: Primary | ICD-10-CM
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
## 2023-08-09 DIAGNOSIS — S70.262A: Primary | ICD-10-CM

## 2023-08-09 PROCEDURE — 99213 OFFICE O/P EST LOW 20 MIN: CPT

## 2023-08-09 PROCEDURE — G0463 HOSPITAL OUTPT CLINIC VISIT: HCPCS

## 2023-08-09 RX ORDER — CEPHALEXIN 500 MG/1
500 CAPSULE ORAL EVERY 6 HOURS SCHEDULED
Qty: 20 CAPSULE | Refills: 0 | Status: SHIPPED | OUTPATIENT
Start: 2023-08-09 | End: 2023-08-14

## 2023-08-09 RX ORDER — CEPHALEXIN 500 MG/1
500 CAPSULE ORAL EVERY 6 HOURS SCHEDULED
Qty: 28 CAPSULE | Refills: 0 | Status: SHIPPED | OUTPATIENT
Start: 2023-08-09 | End: 2023-08-09

## 2023-08-09 NOTE — PROGRESS NOTES
[facility]        NAME: Chivo Bahena is a 57 y.o. male  : 1960    MRN: [id number]  DATE: 2023  TIME: 3:26 PM    Assessment and Plan   Insect bite of hip, infected, left, initial encounter [S70.262A, L08.9, W57. XXXA]  1. Insect bite of hip, infected, left, initial encounter  cephalexin (KEFLEX) 500 mg capsule    DISCONTINUED: cephalexin (KEFLEX) 500 mg capsule          Patient Instructions     You have been prescribed Keflex - take as directed. Follow-up with your PCP for Lyme blood work as discussed. Go to the ED for any severely worsening symptoms. Chief Complaint     Chief Complaint   Patient presents with   • Insect Bite     PT presents with bug bite to left side x 2 days ago. He noticed redness and swelling this morning. Pt denies any fevers. No otc taken today. History of Present Illness       This is a 75-year-old male who presents with a bug bite to his left hip that he noticed 2 days ago. Patient states the area has become comfortable, swollen, and red. He did not see any specific insect, spider, or tick on him. No known fevers. No OTC treatments tried PTA. States history of Lyme disease. Review of Systems   Review of Systems   Constitutional: Negative for chills and fever. Respiratory: Negative for chest tightness and shortness of breath. Cardiovascular: Negative for chest pain and palpitations. Musculoskeletal: Negative for arthralgias and myalgias. Skin:        insect bite   Neurological: Negative for dizziness, light-headedness and headaches.        Current Medications       Current Outpatient Medications:   •  amLODIPine (NORVASC) 5 mg tablet, TAKE 2 TABLETS BY MOUTH EVERY MORNING, Disp: 180 tablet, Rfl: 1  •  Ascorbic Acid (VITAMIN C) 1000 MG tablet, Take 1 tablet by mouth daily, Disp: , Rfl:   •  cephalexin (KEFLEX) 500 mg capsule, Take 1 capsule (500 mg total) by mouth every 6 (six) hours for 5 days, Disp: 20 capsule, Rfl: 0  •  EPINEPHrine (EpiPen 2-Sherman) 0.3 mg/0.3 mL SOAJ, Inject 0.3 mL (0.3 mg total) into a muscle once for 1 dose, Disp: 1 each, Rfl: 1  •  escitalopram (LEXAPRO) 20 mg tablet, TAKE 1 TABLET BY MOUTH EVERY DAY, Disp: 90 tablet, Rfl: 1  •  rosuvastatin (CRESTOR) 10 MG tablet, TAKE 1 TABLET BY MOUTH EVERY DAY, Disp: 90 tablet, Rfl: 0    Current Allergies     Allergies as of 08/09/2023 - Reviewed 08/09/2023   Allergen Reaction Noted   • Bee venom Edema 09/23/2013   • Celecoxib Hives 04/21/2012            The following portions of the patient's history were reviewed and updated as appropriate: allergies, current medications, past family history, past medical history, past social history, past surgical history and problem list.     Past Medical History:   Diagnosis Date   • Cancer ([address])    • COVID-19    • Hyperlipidemia    • Malignant melanoma of skin ([address])     last assessed 10/25/17, resolved 4/20/15       Past Surgical History:   Procedure Laterality Date   • COLONOSCOPY  2012    Dr. Jyoti Millard 1/12/15   • HEMORROIDECTOMY     • SKIN LESION EXCISION Right     Elbow    • TONSILECTOMY AND ADNOIDECTOMY         Family History   Problem Relation Age of Onset   • Diabetes Mother    • Skin cancer Mother    • Basal cell carcinoma Mother    • Coronary artery disease Father          Medications have been verified. Objective   /78   Pulse 85   Temp (!) 97.1 °F (36.2 °C)   Resp 18   Ht 6' 4" (1.93 m)   Wt 107 kg (236 lb)   SpO2 96%   BMI 28.73 kg/m²        Physical Exam     Physical Exam  Vitals and nursing note reviewed. Constitutional:       General: He is not in acute distress. HENT:      Head: Normocephalic. Mouth/Throat:      Mouth: Mucous membranes are moist.   Cardiovascular:      Rate and Rhythm: Normal rate and regular rhythm. Pulses: Normal pulses. Pulmonary:      Effort: Pulmonary effort is normal.      Breath sounds: Normal breath sounds.    Musculoskeletal:         General: Normal range of motion. Cervical back: Normal range of motion and neck supple. Skin:     General: Skin is warm and dry. Capillary Refill: Capillary refill takes less than 2 seconds. Neurological:      Mental Status: He is alert and oriented to person, place, and time.       Gait: Gait normal.

## 2023-08-09 NOTE — PATIENT INSTRUCTIONS
You have been prescribed Keflex - take as directed. Follow-up with your PCP for Lyme blood work as discussed. Go to the ED for any severely worsening symptoms.

## 2023-08-17 DIAGNOSIS — I10 ESSENTIAL HYPERTENSION: ICD-10-CM

## 2023-08-17 RX ORDER — AMLODIPINE BESYLATE 5 MG/1
TABLET ORAL
Qty: 180 TABLET | Refills: 1 | Status: SHIPPED | OUTPATIENT
Start: 2023-08-17

## 2023-09-11 ENCOUNTER — APPOINTMENT (OUTPATIENT)
Dept: LAB | Facility: CLINIC | Age: 63
End: 2023-09-11
Payer: MEDICARE

## 2023-09-11 DIAGNOSIS — R97.20 ELEVATED PSA: ICD-10-CM

## 2023-09-11 DIAGNOSIS — Z12.5 SCREENING FOR PROSTATE CANCER: ICD-10-CM

## 2023-09-11 LAB — PSA SERPL-MCNC: 3.06 NG/ML (ref 0–4)

## 2023-09-11 PROCEDURE — G0103 PSA SCREENING: HCPCS

## 2023-09-11 PROCEDURE — 36415 COLL VENOUS BLD VENIPUNCTURE: CPT

## 2023-09-14 ENCOUNTER — OFFICE VISIT (OUTPATIENT)
Dept: UROLOGY | Facility: MEDICAL CENTER | Age: 63
End: 2023-09-14
Payer: MEDICARE

## 2023-09-14 VITALS
HEART RATE: 65 BPM | HEIGHT: 76 IN | BODY MASS INDEX: 28.62 KG/M2 | SYSTOLIC BLOOD PRESSURE: 110 MMHG | DIASTOLIC BLOOD PRESSURE: 80 MMHG | OXYGEN SATURATION: 98 % | WEIGHT: 235 LBS

## 2023-09-14 DIAGNOSIS — N40.0 BENIGN PROSTATIC HYPERPLASIA WITHOUT LOWER URINARY TRACT SYMPTOMS: Primary | ICD-10-CM

## 2023-09-14 PROCEDURE — 99213 OFFICE O/P EST LOW 20 MIN: CPT | Performed by: UROLOGY

## 2023-09-14 NOTE — PROGRESS NOTES
HISTORY:    Follow-up BPH, prostate cancer screening. PSA 3.0 in September 2023  2.6 in September 2022  2.7 in 2021  1.4 in 2020  1.7 in 2018      MRI in November 2021 showed nothing suspicious. Prostate 44 mL volume on MRI minimal BPH symptoms, no need for meds         ASSESSMENT / PLAN:    Doing well    Follow-up 1 year with PSA, we might stretch our visits out to 2 years apart at some point    The following portions of the patient's history were reviewed and updated as appropriate: allergies, current medications, past family history, past medical history, past social history, past surgical history and problem list.    Review of Systems   All other systems reviewed and are negative.         Objective:     Physical Exam  Genitourinary:     Comments: Penis testes normal    Prostate minimally enlarged nodules          0   Lab Value Date/Time    PSA 3.06 09/11/2023 0926    PSA 2.6 09/08/2022 1019    PSA 2.7 10/05/2021 1145    PSA 2.7 08/13/2021 1130    PSA 1.4 07/28/2020 0934    PSA 1.3 10/24/2014 0744   ]  BUN   Date Value Ref Range Status   04/18/2023 25 5 - 25 mg/dL Final   11/05/2015 21 5 - 25 mg/dL Final     Creatinine   Date Value Ref Range Status   04/18/2023 1.02 0.60 - 1.30 mg/dL Final     Comment:     Standardized to IDMS reference method   11/05/2015 0.90 0.60 - 1.30 mg/dL Final     Comment:     Standardized to IDMS reference method     No components found for: "CBC"      Patient Active Problem List   Diagnosis   • BPH with obstruction/lower urinary tract symptoms   • Organic impotence   • Screening for diabetes mellitus   • Essential hypertension   • Anxiety   • Hyperlipidemia   • Greater trochanteric bursitis, left   • Acute kidney injury ([address])   • Malignant melanoma of skin ([address])   • Abnormal bone radiograph   • Primary osteoarthritis of right knee   • Knee pain   • Lumbar pain   • Nontraumatic tear of rotator cuff   • Allergy to bee sting   • Colon cancer screening   • Abnormal laboratory test • Screening PSA (prostate specific antigen)   • Essential (primary) hypertension   • Exposure to COVID-19 virus   • Elevated fasting blood sugar   • Wellness examination   • Need for influenza vaccination   • Plantar fasciitis   • Tendinitis of left rotator cuff   • Prediabetes   • Labral tear of shoulder, degenerative, left   • Overweight (BMI 25.0-29. 9)        Diagnoses and all orders for this visit:    Benign prostatic hyperplasia without lower urinary tract symptoms  -     PSA Total, Diagnostic; Future           Patient ID: Gustavo Fontenot is a 70 y.o. male.       Current Outpatient Medications:   •  amLODIPine (NORVASC) 5 mg tablet, TAKE 2 TABLETS BY MOUTH EVERY MORNING, Disp: 180 tablet, Rfl: 1  •  Ascorbic Acid (VITAMIN C) 1000 MG tablet, Take 1 tablet by mouth daily, Disp: , Rfl:   •  escitalopram (LEXAPRO) 20 mg tablet, TAKE 1 TABLET BY MOUTH EVERY DAY, Disp: 90 tablet, Rfl: 1  •  rosuvastatin (CRESTOR) 10 MG tablet, TAKE 1 TABLET BY MOUTH EVERY DAY, Disp: 90 tablet, Rfl: 0  •  EPINEPHrine (EpiPen 2-Sherman) 0.3 mg/0.3 mL SOAJ, Inject 0.3 mL (0.3 mg total) into a muscle once for 1 dose, Disp: 1 each, Rfl: 1    Past Medical History:   Diagnosis Date   • Cancer ([address])    • COVID-19    • Hyperlipidemia    • Malignant melanoma of skin ([address])     last assessed 10/25/17, resolved 4/20/15       Past Surgical History:   Procedure Laterality Date   • COLONOSCOPY  2012    Dr. Chris Modi   Impression 1/12/15   • HEMORROIDECTOMY     • SKIN LESION EXCISION Right     Elbow    • TONSILECTOMY AND ADNOIDECTOMY         Social History

## 2023-10-20 DIAGNOSIS — E78.5 HYPERLIPIDEMIA, UNSPECIFIED HYPERLIPIDEMIA TYPE: ICD-10-CM

## 2023-10-20 RX ORDER — ROSUVASTATIN CALCIUM 10 MG/1
TABLET, COATED ORAL
Qty: 90 TABLET | Refills: 1 | Status: SHIPPED | OUTPATIENT
Start: 2023-10-20

## 2023-11-02 ENCOUNTER — APPOINTMENT (OUTPATIENT)
Dept: LAB | Facility: CLINIC | Age: 63
End: 2023-11-02
Payer: MEDICARE

## 2023-11-02 DIAGNOSIS — R73.01 ELEVATED FASTING BLOOD SUGAR: ICD-10-CM

## 2023-11-02 DIAGNOSIS — E78.5 HYPERLIPIDEMIA, UNSPECIFIED HYPERLIPIDEMIA TYPE: ICD-10-CM

## 2023-11-02 LAB
ALBUMIN SERPL BCP-MCNC: 4.4 G/DL (ref 3.5–5)
ALP SERPL-CCNC: 57 U/L (ref 34–104)
ALT SERPL W P-5'-P-CCNC: 19 U/L (ref 7–52)
ANION GAP SERPL CALCULATED.3IONS-SCNC: 11 MMOL/L
AST SERPL W P-5'-P-CCNC: 19 U/L (ref 13–39)
BILIRUB SERPL-MCNC: 1.9 MG/DL (ref 0.2–1)
BUN SERPL-MCNC: 23 MG/DL (ref 5–25)
CALCIUM SERPL-MCNC: 9.3 MG/DL (ref 8.4–10.2)
CHLORIDE SERPL-SCNC: 103 MMOL/L (ref 96–108)
CHOLEST SERPL-MCNC: 152 MG/DL
CO2 SERPL-SCNC: 26 MMOL/L (ref 21–32)
CREAT SERPL-MCNC: 0.94 MG/DL (ref 0.6–1.3)
GFR SERPL CREATININE-BSD FRML MDRD: 84 ML/MIN/1.73SQ M
GLUCOSE P FAST SERPL-MCNC: 87 MG/DL (ref 65–99)
HDLC SERPL-MCNC: 50 MG/DL
LDLC SERPL CALC-MCNC: 83 MG/DL (ref 0–100)
POTASSIUM SERPL-SCNC: 3.6 MMOL/L (ref 3.5–5.3)
PROT SERPL-MCNC: 7.3 G/DL (ref 6.4–8.4)
SODIUM SERPL-SCNC: 140 MMOL/L (ref 135–147)
TRIGL SERPL-MCNC: 94 MG/DL

## 2023-11-02 PROCEDURE — 36415 COLL VENOUS BLD VENIPUNCTURE: CPT

## 2023-11-02 PROCEDURE — 80053 COMPREHEN METABOLIC PANEL: CPT

## 2023-11-02 PROCEDURE — 80061 LIPID PANEL: CPT

## 2023-11-07 ENCOUNTER — OFFICE VISIT (OUTPATIENT)
Dept: INTERNAL MEDICINE CLINIC | Facility: CLINIC | Age: 63
End: 2023-11-07

## 2023-11-07 VITALS
HEIGHT: 76 IN | DIASTOLIC BLOOD PRESSURE: 74 MMHG | RESPIRATION RATE: 16 BRPM | BODY MASS INDEX: 28.57 KG/M2 | WEIGHT: 234.6 LBS | HEART RATE: 62 BPM | OXYGEN SATURATION: 97 % | SYSTOLIC BLOOD PRESSURE: 114 MMHG

## 2023-11-07 DIAGNOSIS — C43.9 MALIGNANT MELANOMA OF SKIN (HCC): ICD-10-CM

## 2023-11-07 DIAGNOSIS — F41.9 ANXIETY: ICD-10-CM

## 2023-11-07 DIAGNOSIS — E78.5 HYPERLIPIDEMIA, UNSPECIFIED HYPERLIPIDEMIA TYPE: ICD-10-CM

## 2023-11-07 DIAGNOSIS — R73.03 PREDIABETES: ICD-10-CM

## 2023-11-07 DIAGNOSIS — I10 ESSENTIAL HYPERTENSION: ICD-10-CM

## 2023-11-07 DIAGNOSIS — Z00.00 MEDICARE ANNUAL WELLNESS VISIT, SUBSEQUENT: ICD-10-CM

## 2023-11-07 DIAGNOSIS — Z23 ENCOUNTER FOR IMMUNIZATION: Primary | ICD-10-CM

## 2023-11-07 NOTE — PATIENT INSTRUCTIONS
Medicare Preventive Visit Patient Instructions  Thank you for completing your Welcome to Medicare Visit or Medicare Annual Wellness Visit today. Your next wellness visit will be due in one year (11/7/2024). The screening/preventive services that you may require over the next 5-10 years are detailed below. Some tests may not apply to you based off risk factors and/or age. Screening tests ordered at today's visit but not completed yet may show as past due. Also, please note that scanned in results may not display below. Preventive Screenings:  Service Recommendations Previous Testing/Comments   Colorectal Cancer Screening  Colonoscopy    Fecal Occult Blood Test (FOBT)/Fecal Immunochemical Test (FIT)  Fecal DNA/Cologuard Test  Flexible Sigmoidoscopy Age: 43-73 years old   Colonoscopy: every 10 years (May be performed more frequently if at higher risk)  OR  FOBT/FIT: every 1 year  OR  Cologuard: every 3 years  OR  Sigmoidoscopy: every 5 years  Screening may be recommended earlier than age 39 if at higher risk for colorectal cancer. Also, an individualized decision between you and your healthcare provider will decide whether screening between the ages of 77-80 would be appropriate. Colonoscopy: 07/16/2020  FOBT/FIT: Not on file  Cologuard: Not on file  Sigmoidoscopy: Not on file          Prostate Cancer Screening Individualized decision between patient and health care provider in men between ages of 53-66   Medicare will cover every 12 months beginning on the day after your 50th birthday PSA: 3.06 ng/mL           Hepatitis C Screening Once for adults born between 1945 and 1965  More frequently in patients at high risk for Hepatitis C Hep C Antibody: 11/20/2014        Diabetes Screening 1-2 times per year if you're at risk for diabetes or have pre-diabetes Fasting glucose: 87 mg/dL (11/2/2023)  A1C: 5.4 % (10/13/2022)      Cholesterol Screening Once every 5 years if you don't have a lipid disorder.  May order more often based on risk factors. Lipid panel: 11/02/2023         Other Preventive Screenings Covered by Medicare:  Abdominal Aortic Aneurysm (AAA) Screening: covered once if your at risk. You're considered to be at risk if you have a family history of AAA or a male between the age of 70-76 who smoking at least 100 cigarettes in your lifetime. Lung Cancer Screening: covers low dose CT scan once per year if you meet all of the following conditions: (1) Age 48-67; (2) No signs or symptoms of lung cancer; (3) Current smoker or have quit smoking within the last 15 years; (4) You have a tobacco smoking history of at least 20 pack years (packs per day x number of years you smoked); (5) You get a written order from a healthcare provider. Glaucoma Screening: covered annually if you're considered high risk: (1) You have diabetes OR (2) Family history of glaucoma OR (3)  aged 48 and older OR (3)  American aged 72 and older  Osteoporosis Screening: covered every 2 years if you meet one of the following conditions: (1) Have a vertebral abnormality; (2) On glucocorticoid therapy for more than 3 months; (3) Have primary hyperparathyroidism; (4) On osteoporosis medications and need to assess response to drug therapy. HIV Screening: covered annually if you're between the age of 14-79. Also covered annually if you are younger than 13 and older than 72 with risk factors for HIV infection. For pregnant patients, it is covered up to 3 times per pregnancy.     Immunizations:  Immunization Recommendations   Influenza Vaccine Annual influenza vaccination during flu season is recommended for all persons aged >= 6 months who do not have contraindications   Pneumococcal Vaccine   * Pneumococcal conjugate vaccine = PCV13 (Prevnar 13), PCV15 (Vaxneuvance), PCV20 (Prevnar 20)  * Pneumococcal polysaccharide vaccine = PPSV23 (Pneumovax) Adults 74-10 yo with certain risk factors or if 69+ yo  If never received any pneumonia vaccine: recommend Prevnar 21 (PCV20)  Give PCV20 if previously received 1 dose of PCV13 or PPSV23   Hepatitis B Vaccine 3 dose series if at intermediate or high risk (ex: diabetes, end stage renal disease, liver disease)   Respiratory syncytial virus (RSV) Vaccine - COVERED BY MEDICARE PART D  * RSVPreF3 (Arexvy) CDC recommends that adults 61years of age and older may receive a single dose of RSV vaccine using shared clinical decision-making (SCDM)   Tetanus (Td) Vaccine - COST NOT COVERED BY MEDICARE PART B Following completion of primary series, a booster dose should be given every 10 years to maintain immunity against tetanus. Td may also be given as tetanus wound prophylaxis. Tdap Vaccine - COST NOT COVERED BY MEDICARE PART B Recommended at least once for all adults. For pregnant patients, recommended with each pregnancy. Shingles Vaccine (Shingrix) - COST NOT COVERED BY MEDICARE PART B  2 shot series recommended in those 23 years and older who have or will have weakened immune systems or those 50 years and older     Health Maintenance Due:      Topic Date Due   • HIV Screening  Never done   • Colorectal Cancer Screening  07/16/2025   • Hepatitis C Screening  Completed     Immunizations Due:      Topic Date Due   • COVID-19 Vaccine (3 - Pfizer series) 06/02/2021   • Pneumococcal Vaccine: 65+ Years (1 - PCV) Never done   • Influenza Vaccine (1) 09/01/2023     Advance Directives   What are advance directives? Advance directives are legal documents that state your wishes and plans for medical care. These plans are made ahead of time in case you lose your ability to make decisions for yourself. Advance directives can apply to any medical decision, such as the treatments you want, and if you want to donate organs. What are the types of advance directives? There are many types of advance directives, and each state has rules about how to use them.  You may choose a combination of any of the following:  Living will: This is a written record of the treatment you want. You can also choose which treatments you do not want, which to limit, and which to stop at a certain time. This includes surgery, medicine, IV fluid, and tube feedings. Durable power of  for [facility]): This is a written record that states who you want to make healthcare choices for you when you are unable to make them for yourself. This person, called a proxy, is usually a family member or a friend. You may choose more than 1 proxy. Do not resuscitate (DNR) order:  A DNR order is used in case your heart stops beating or you stop breathing. It is a request not to have certain forms of treatment, such as CPR. A DNR order may be included in other types of advance directives. Medical directive: This covers the care that you want if you are in a coma, near death, or unable to make decisions for yourself. You can list the treatments you want for each condition. Treatment may include pain medicine, surgery, blood transfusions, dialysis, IV or tube feedings, and a ventilator (breathing machine). Values history: This document has questions about your views, beliefs, and how you feel and think about life. This information can help others choose the care that you would choose. Why are advance directives important? An advance directive helps you control your care. Although spoken wishes may be used, it is better to have your wishes written down. Spoken wishes can be misunderstood, or not followed. Treatments may be given even if you do not want them. An advance directive may make it easier for your family to make difficult choices about your care. Weight Management   Why it is important to manage your weight:  Being overweight increases your risk of health conditions such as heart disease, high blood pressure, type 2 diabetes, and certain types of cancer.  It can also increase your risk for osteoarthritis, sleep apnea, and other respiratory problems. Aim for a slow, steady weight loss. Even a small amount of weight loss can lower your risk of health problems. How to lose weight safely:  A safe and healthy way to lose weight is to eat fewer calories and get regular exercise. You can lose up about 1 pound a week by decreasing the number of calories you eat by 500 calories each day. Healthy meal plan for weight management:  A healthy meal plan includes a variety of foods, contains fewer calories, and helps you stay healthy. A healthy meal plan includes the following:  Eat whole-grain foods more often. A healthy meal plan should contain fiber. Fiber is the part of grains, fruits, and vegetables that is not broken down by your body. Whole-grain foods are healthy and provide extra fiber in your diet. Some examples of whole-grain foods are whole-wheat breads and pastas, oatmeal, brown rice, and bulgur. Eat a variety of vegetables every day. Include dark, leafy greens such as spinach, kale, pat greens, and mustard greens. Eat yellow and orange vegetables such as carrots, sweet potatoes, and winter squash. Eat a variety of fruits every day. Choose fresh or canned fruit (canned in its own juice or light syrup) instead of juice. Fruit juice has very little or no fiber. Eat low-fat dairy foods. Drink fat-free (skim) milk or 1% milk. Eat fat-free yogurt and low-fat cottage cheese. Try low-fat cheeses such as mozzarella and other reduced-fat cheeses. Choose meat and other protein foods that are low in fat. Choose beans or other legumes such as split peas or lentils. Choose fish, skinless poultry (chicken or turkey), or lean cuts of red meat (beef or pork). Before you cook meat or poultry, cut off any visible fat. Use less fat and oil. Try baking foods instead of frying them. Add less fat, such as margarine, sour cream, regular salad dressing and mayonnaise to foods. Eat fewer high-fat foods.  Some examples of high-fat foods include french fries, doughnuts, ice cream, and cakes. Eat fewer sweets. Limit foods and drinks that are high in sugar. This includes candy, cookies, regular soda, and sweetened drinks. Exercise:  Exercise at least 30 minutes per day on most days of the week. Some examples of exercise include walking, biking, dancing, and swimming. You can also fit in more physical activity by taking the stairs instead of the elevator or parking farther away from stores. Ask your healthcare provider about the best exercise plan for you. © Copyright [employer] 2018 Information is for End User's use only and may not be sold, redistributed or otherwise used for commercial purposes.  All illustrations and images included in CareNotes® are the copyrighted property of [employer].D.A.M., Inc. or  [address]

## 2023-11-07 NOTE — PROGRESS NOTES
Assessment and Plan:     Problem List Items Addressed This Visit        Cardiovascular and Mediastinum    Essential hypertension     Hypertension - controlled, I have counseled patient following healthy balance diet, I would like the patient reduce sodium, exercise routinely, I would like the patient continued the med current medical regiment and we will continue to monitor. Continue amlodipine 5 mg once daily            Musculoskeletal and Integument    Malignant melanoma of skin (HCC)     Currently stable doing well recent evaluation by Derm            Other    Anxiety     Clinically stable and doing well continue the current medical regiment will continue monitor. Continue Lexapro 20 mg once daily         Hyperlipidemia     Hyperlipidemia controlled continue with current medical regiment recommend a low-cholesterol diet and recommend routine exercise we will continue to monitor the progress. Continue Crestor 10 mg once daily         Relevant Orders    Lipid Panel with Direct LDL reflex    Medicare annual wellness visit, subsequent     Assessment and plan 1. Medicare subsequent annual wellness examination overall the patient is clinically stable and doing well, we encouraged the patient to follow a healthy and balanced diet. We recommend that the patient exercise routinely approximately 30 minutes 5 times per week . We have reviewed the patient's vaccines and have made recommendations for updates if necessary annual flu shot, consider RSV and COVID booster    . We will be ordering screening laboratories which are age appropriate. Return to the office in   6 months   call if any problems. Prediabetes     Pre diabetes -I have counseled the patient to follow a healthy balanced diet, I have counseled patient reduce carbohydrates and sweets in the diet, I would like the patient exercise routinely. I will be checking hemoglobin A1c and comprehensive metabolic panel.   Have counseled patient about the prevention of diabetes, and the risk of progression to type 2 diabetes. Relevant Orders    Comprehensive metabolic panel    Hemoglobin A1C   Other Visit Diagnoses     Encounter for immunization    -  Primary    Relevant Orders    Pneumococcal Conjugate Vaccine 20-valent (PCV20) (Completed)    influenza vaccine, high-dose, PF 0.7 mL (FLUZONE HIGH-DOSE) (Completed)        BMI Counseling: Body mass index is 28.56 kg/m². The BMI is above normal. Nutrition recommendations include decreasing portion sizes and moderation in carbohydrate intake. Exercise recommendations include exercising 3-5 times per week. Rationale for BMI follow-up plan is due to patient being overweight or obese. Depression Screening and Follow-up Plan: Patient was screened for depression during today's encounter. They screened negative with a PHQ-2 score of 0. Preventive health issues were discussed with patient, and age appropriate screening tests were ordered as noted in patient's After Visit Summary. Personalized health advice and appropriate referrals for health education or preventive services given if needed, as noted in patient's After Visit Summary. History of Present Illness:     Patient presents for a Medicare Wellness Visit    HPI 76-year old male coming in for a follow up visit regarding hyperlipidemia, prediabetes, essential hypertension, anxiety and malignant melanoma; the patient reports me compliant taking medications without untoward side effects the. The patient is here to review his medical condition, update me on the medical condition and the patient reports me no hospitalizations and no ER visits. No injuries no illnesses overall doing very well following a healthy and balanced diet exercising routinely. Here to review laboratories. Recent evaluation by dermatology currently stable anxiety levels are stable.   Overall doing very well  Patient Care Team:  Nuzhat Alvarado DO as PCP - [address] [place] [address] Ronald Marrufo as PCP - PCP-[facility] Attributed-Roster  MD Kasey Ramey DO Amada Rink, MD Joye Clear, MD     Review of Systems:     Review of Systems   Constitutional:  Negative for activity change, appetite change and unexpected weight change. HENT:  Negative for congestion and postnasal drip. Eyes:  Negative for visual disturbance. Respiratory:  Negative for cough and shortness of breath. Cardiovascular:  Negative for chest pain. Gastrointestinal:  Negative for abdominal pain, diarrhea, nausea and vomiting. Neurological:  Negative for dizziness, light-headedness and headaches. Hematological:  Negative for adenopathy. Problem List:     Patient Active Problem List   Diagnosis   • BPH with obstruction/lower urinary tract symptoms   • Organic impotence   • Screening for diabetes mellitus   • Essential hypertension   • Anxiety   • Hyperlipidemia   • Greater trochanteric bursitis, left   • Acute kidney injury ([address])   • Malignant melanoma of skin ([address])   • Abnormal bone radiograph   • Primary osteoarthritis of right knee   • Knee pain   • Lumbar pain   • Nontraumatic tear of rotator cuff   • Allergy to bee sting   • Colon cancer screening   • Abnormal laboratory test   • Screening PSA (prostate specific antigen)   • Essential (primary) hypertension   • Exposure to COVID-19 virus   • Elevated fasting blood sugar   • Medicare annual wellness visit, subsequent   • Need for influenza vaccination   • Plantar fasciitis   • Tendinitis of left rotator cuff   • Prediabetes   • Labral tear of shoulder, degenerative, left   • Overweight (BMI 25.0-29. 9)      Past Medical and Surgical History:     Past Medical History:   Diagnosis Date   • Cancer [facility])    • COVID-19    • Hyperlipidemia    • Malignant melanoma of skin ([address])     last assessed 10/25/17, resolved 4/20/15     Past Surgical History:   Procedure Laterality Date   • COLONOSCOPY  2012    Dr. Dena Luna Impression 1/12/15   • HEMORROIDECTOMY     • SKIN LESION EXCISION Right     Elbow    • TONSILECTOMY AND ADNOIDECTOMY        Family History:     Family History   Problem Relation Age of Onset   • Diabetes Mother    • Skin cancer Mother    • Basal cell carcinoma Mother    • Coronary artery disease Father       Social History:     Social History     Socioeconomic History   • Marital status: /Civil Union     Spouse name: None   • Number of children: None   • Years of education: None   • Highest education level: None   Occupational History   • None   Tobacco Use   • Smoking status: Never   • Smokeless tobacco: Never   Vaping Use   • Vaping Use: Never used   Substance and Sexual Activity   • Alcohol use: Yes     Comment: Social    • Drug use: No   • Sexual activity: Yes   Other Topics Concern   • None   Social History Narrative   • None     Social Determinants of Health     Financial Resource Strain: Low Risk  (11/7/2023)    Overall Financial Resource Strain (CARDIA)    • Difficulty of Paying Living Expenses: Not hard at all   Food Insecurity: Not on file   Transportation Needs: No Transportation Needs (11/7/2023)    PRAPARE - Transportation    • Lack of Transportation (Medical): No    • Lack of Transportation (Non-Medical):  No   Physical Activity: Not on file   Stress: Not on file   Social Connections: Not on file   Intimate Partner Violence: Not on file   Housing Stability: Not on file      Medications and Allergies:     Current Outpatient Medications   Medication Sig Dispense Refill   • amLODIPine (NORVASC) 5 mg tablet TAKE 2 TABLETS BY MOUTH EVERY MORNING 180 tablet 1   • Ascorbic Acid (VITAMIN C) 1000 MG tablet Take 1 tablet by mouth daily     • escitalopram (LEXAPRO) 20 mg tablet TAKE 1 TABLET BY MOUTH EVERY DAY 90 tablet 1   • rosuvastatin (CRESTOR) 10 MG tablet TAKE 1 TABLET BY MOUTH EVERY DAY 90 tablet 1   • EPINEPHrine (EpiPen 2-Sherman) 0.3 mg/0.3 mL SOAJ Inject 0.3 mL (0.3 mg total) into a muscle once for 1 dose 1 each 1     No current facility-administered medications for this visit. Allergies   Allergen Reactions   • Bee Venom Edema   • Celecoxib Hives     Reaction Date: 21Nov2011; Category: Allergy;       Immunizations:     Immunization History   Administered Date(s) Administered   • COVID-19 PFIZER VACCINE 0.3 ML IM 03/10/2021, 04/07/2021   • Influenza Quadrivalent Preservative Free 3 years and older IM 10/17/2014, 11/13/2015, 10/14/2016, 10/07/2017   • Influenza, high dose seasonal 0.7 mL 11/07/2023   • Influenza, recombinant, quadrivalent,injectable, preservative free 10/03/2018, 09/30/2019, 10/12/2020, 10/12/2021, 10/21/2022   • Influenza, seasonal, injectable 09/20/2012, 09/27/2013   • Pneumococcal Conjugate Vaccine 20-valent (Pcv20), Polysace 11/07/2023   • Td (adult), adsorbed 03/21/2007   • Tdap 04/11/2017      Health Maintenance:         Topic Date Due   • HIV Screening  Never done   • Colorectal Cancer Screening  07/16/2025   • Hepatitis C Screening  Completed         Topic Date Due   • COVID-19 Vaccine (3 - Pfizer series) 06/02/2021      Medicare Screening Tests and Risk Assessments:     Hector Anne is here for his Subsequent Wellness visit. Health Risk Assessment:   Patient rates overall health as good. Patient feels that their physical health rating is same. Patient is satisfied with their life. Eyesight was rated as same. Hearing was rated as same. Patient feels that their emotional and mental health rating is same. Patients states they are never, rarely angry. Patient states they are never, rarely unusually tired/fatigued. Pain experienced in the last 7 days has been none. Patient states that he has experienced no weight loss or gain in last 6 months. Depression Screening:   PHQ-2 Score: 0      Fall Risk Screening: In the past year, patient has experienced: no history of falling in past year      Home Safety:  Patient does not have trouble with stairs inside or outside of their home.  Patient has working smoke alarms and has working carbon monoxide detector. Home safety hazards include: none. Nutrition:   Current diet is Regular. Medications:   Patient is currently taking over-the-counter supplements. OTC medications include: see medication list. Patient is able to manage medications. Activities of Daily Living (ADLs)/Instrumental Activities of Daily Living (IADLs):   Walk and transfer into and out of bed and chair?: Yes  Dress and groom yourself?: Yes    Bathe or shower yourself?: Yes    Feed yourself? Yes  Do your laundry/housekeeping?: Yes  Manage your money, pay your bills and track your expenses?: Yes  Make your own meals?: Yes    Do your own shopping?: Yes    Previous Hospitalizations:   Any hospitalizations or ED visits within the last 12 months?: Yes    How many hospitalizations have you had in the last year?: 1-2    Hospitalization Comments: [facility] spider bite    Advance Care Planning:   Living will: Yes    Durable POA for healthcare: Yes    Advanced directive: Yes      Cognitive Screening:   Provider or family/friend/caregiver concerned regarding cognition?: No    PREVENTIVE SCREENINGS      Cardiovascular Screening:    General: Screening Not Indicated and History Lipid Disorder      Diabetes Screening:     General: Screening Current      Colorectal Cancer Screening:     General: Screening Current      Prostate Cancer Screening:    General: Screening Current      Abdominal Aortic Aneurysm (AAA) Screening:    Risk factors include: age between 70-75 yo        Lung Cancer Screening:     General: Screening Not Indicated      Hepatitis C Screening:    General: Screening Current    Screening, Brief Intervention, and Referral to Treatment (SBIRT)    Screening  Typical number of drinks in a day: 0  Typical number of drinks in a week: 2  Interpretation: Low risk drinking behavior.     Single Item Drug Screening:  How often have you used an illegal drug (including marijuana) or a prescription medication for non-medical reasons in the past year? never    Single Item Drug Screen Score: 0  Interpretation: Negative screen for possible drug use disorder    No results found. Physical Exam:     /74   Pulse 62   Resp 16   Ht 6' 4" (1.93 m)   Wt 106 kg (234 lb 9.6 oz)   SpO2 97%   BMI 28.56 kg/m²     Physical Exam  Vitals and nursing note reviewed. Constitutional:       General: He is not in acute distress. Appearance: Normal appearance. He is well-developed. He is not ill-appearing, toxic-appearing or diaphoretic. HENT:      Head: Normocephalic and atraumatic. Right Ear: External ear normal.      Left Ear: External ear normal.      Nose: Nose normal.   Eyes:      Pupils: Pupils are equal, round, and reactive to light. Cardiovascular:      Rate and Rhythm: Normal rate and regular rhythm. Heart sounds: Normal heart sounds. No murmur heard. Pulmonary:      Effort: Pulmonary effort is normal.      Breath sounds: Normal breath sounds. Abdominal:      General: There is no distension. Palpations: Abdomen is soft. Tenderness: There is no abdominal tenderness. There is no guarding. Psychiatric:         Mood and Affect: Mood is not anxious or depressed.           Gera Miramontes, DO

## 2023-11-08 NOTE — ASSESSMENT & PLAN NOTE
Clinically stable and doing well continue the current medical regiment will continue monitor.   Continue Lexapro 20 mg once daily

## 2023-11-08 NOTE — ASSESSMENT & PLAN NOTE
Assessment and plan 1. Medicare subsequent annual wellness examination overall the patient is clinically stable and doing well, we encouraged the patient to follow a healthy and balanced diet. We recommend that the patient exercise routinely approximately 30 minutes 5 times per week . We have reviewed the patient's vaccines and have made recommendations for updates if necessary annual flu shot, consider RSV and COVID booster    . We will be ordering screening laboratories which are age appropriate. Return to the office in   6 months   call if any problems.

## 2024-01-06 PROBLEM — Z00.00 MEDICARE ANNUAL WELLNESS VISIT, SUBSEQUENT: Status: RESOLVED | Noted: 2020-10-12 | Resolved: 2024-01-06

## 2024-01-10 DIAGNOSIS — F41.9 ANXIETY: ICD-10-CM

## 2024-01-10 RX ORDER — ESCITALOPRAM OXALATE 20 MG/1
TABLET ORAL
Qty: 90 TABLET | Refills: 1 | Status: SHIPPED | OUTPATIENT
Start: 2024-01-10

## 2024-02-21 PROBLEM — Z13.1 SCREENING FOR DIABETES MELLITUS: Status: RESOLVED | Noted: 2018-06-06 | Resolved: 2024-02-21

## 2024-02-21 PROBLEM — Z12.5 SCREENING PSA (PROSTATE SPECIFIC ANTIGEN): Status: RESOLVED | Noted: 2020-06-11 | Resolved: 2024-02-21

## 2024-02-21 PROBLEM — Z12.11 COLON CANCER SCREENING: Status: RESOLVED | Noted: 2019-09-30 | Resolved: 2024-02-21

## 2024-02-25 DIAGNOSIS — I10 ESSENTIAL HYPERTENSION: Primary | ICD-10-CM

## 2024-02-25 RX ORDER — AMLODIPINE BESYLATE 5 MG/1
TABLET ORAL
Qty: 90 TABLET | Refills: 1 | Status: SHIPPED | OUTPATIENT
Start: 2024-02-25

## 2024-03-27 DIAGNOSIS — E78.5 HYPERLIPIDEMIA, UNSPECIFIED HYPERLIPIDEMIA TYPE: ICD-10-CM

## 2024-03-27 RX ORDER — ROSUVASTATIN CALCIUM 10 MG/1
TABLET, COATED ORAL
Qty: 90 TABLET | Refills: 1 | Status: SHIPPED | OUTPATIENT
Start: 2024-03-27

## 2024-03-27 NOTE — TELEPHONE ENCOUNTER
Patient called to request a refill for their rosuvastatin advised a refill was requested on 03/27/24 and is pending approval. Patient verbalized understanding and is in agreement.

## 2024-04-03 DIAGNOSIS — I10 ESSENTIAL HYPERTENSION: ICD-10-CM

## 2024-04-03 RX ORDER — AMLODIPINE BESYLATE 5 MG/1
TABLET ORAL
Qty: 180 TABLET | Refills: 1 | Status: SHIPPED | OUTPATIENT
Start: 2024-04-03

## 2024-05-17 ENCOUNTER — APPOINTMENT (OUTPATIENT)
Dept: LAB | Facility: CLINIC | Age: 64
End: 2024-05-17
Payer: MEDICARE

## 2024-05-17 DIAGNOSIS — E78.5 HYPERLIPIDEMIA, UNSPECIFIED HYPERLIPIDEMIA TYPE: ICD-10-CM

## 2024-05-17 DIAGNOSIS — R73.03 PREDIABETES: ICD-10-CM

## 2024-05-17 LAB
ALBUMIN SERPL BCP-MCNC: 4.4 G/DL (ref 3.5–5)
ALP SERPL-CCNC: 71 U/L (ref 34–104)
ALT SERPL W P-5'-P-CCNC: 29 U/L (ref 7–52)
ANION GAP SERPL CALCULATED.3IONS-SCNC: 10 MMOL/L (ref 4–13)
AST SERPL W P-5'-P-CCNC: 28 U/L (ref 13–39)
BILIRUB SERPL-MCNC: 1.33 MG/DL (ref 0.2–1)
BUN SERPL-MCNC: 26 MG/DL (ref 5–25)
CALCIUM SERPL-MCNC: 9.2 MG/DL (ref 8.4–10.2)
CHLORIDE SERPL-SCNC: 104 MMOL/L (ref 96–108)
CHOLEST SERPL-MCNC: 166 MG/DL
CO2 SERPL-SCNC: 27 MMOL/L (ref 21–32)
CREAT SERPL-MCNC: 0.9 MG/DL (ref 0.6–1.3)
EST. AVERAGE GLUCOSE BLD GHB EST-MCNC: 114 MG/DL
GFR SERPL CREATININE-BSD FRML MDRD: 89 ML/MIN/1.73SQ M
GLUCOSE P FAST SERPL-MCNC: 95 MG/DL (ref 65–99)
HBA1C MFR BLD: 5.6 %
HDLC SERPL-MCNC: 49 MG/DL
LDLC SERPL CALC-MCNC: 103 MG/DL (ref 0–100)
POTASSIUM SERPL-SCNC: 4.1 MMOL/L (ref 3.5–5.3)
PROT SERPL-MCNC: 6.8 G/DL (ref 6.4–8.4)
SODIUM SERPL-SCNC: 141 MMOL/L (ref 135–147)
TRIGL SERPL-MCNC: 72 MG/DL

## 2024-05-17 PROCEDURE — 80053 COMPREHEN METABOLIC PANEL: CPT

## 2024-05-17 PROCEDURE — 80061 LIPID PANEL: CPT

## 2024-05-17 PROCEDURE — 36415 COLL VENOUS BLD VENIPUNCTURE: CPT

## 2024-05-21 ENCOUNTER — OFFICE VISIT (OUTPATIENT)
Dept: INTERNAL MEDICINE CLINIC | Facility: CLINIC | Age: 64
End: 2024-05-21
Payer: MEDICARE

## 2024-05-21 VITALS
TEMPERATURE: 97.3 F | OXYGEN SATURATION: 98 % | RESPIRATION RATE: 16 BRPM | DIASTOLIC BLOOD PRESSURE: 76 MMHG | HEIGHT: 76 IN | BODY MASS INDEX: 29.22 KG/M2 | HEART RATE: 74 BPM | SYSTOLIC BLOOD PRESSURE: 120 MMHG | WEIGHT: 240 LBS

## 2024-05-21 DIAGNOSIS — R73.03 PREDIABETES: ICD-10-CM

## 2024-05-21 DIAGNOSIS — F41.9 ANXIETY: ICD-10-CM

## 2024-05-21 DIAGNOSIS — E66.3 OVERWEIGHT (BMI 25.0-29.9): ICD-10-CM

## 2024-05-21 DIAGNOSIS — E78.5 HYPERLIPIDEMIA, UNSPECIFIED HYPERLIPIDEMIA TYPE: Primary | ICD-10-CM

## 2024-05-21 DIAGNOSIS — N13.8 BPH WITH OBSTRUCTION/LOWER URINARY TRACT SYMPTOMS: ICD-10-CM

## 2024-05-21 DIAGNOSIS — K21.9 GASTROESOPHAGEAL REFLUX DISEASE WITHOUT ESOPHAGITIS: ICD-10-CM

## 2024-05-21 DIAGNOSIS — N40.1 BPH WITH OBSTRUCTION/LOWER URINARY TRACT SYMPTOMS: ICD-10-CM

## 2024-05-21 PROBLEM — C43.9 MALIGNANT MELANOMA OF SKIN (HCC): Status: RESOLVED | Noted: 2018-08-02 | Resolved: 2024-05-21

## 2024-05-21 PROCEDURE — G2211 COMPLEX E/M VISIT ADD ON: HCPCS | Performed by: INTERNAL MEDICINE

## 2024-05-21 PROCEDURE — 99214 OFFICE O/P EST MOD 30 MIN: CPT | Performed by: INTERNAL MEDICINE

## 2024-05-21 NOTE — PROGRESS NOTES
Assessment/Plan:    Anxiety  Clinically stable and doing well continue the current medical regiment will continue monitor.  Continue Lexapro 20 mg once daily    BPH with obstruction/lower urinary tract symptoms  Currently stable and doing well routine follow-up with urology    Hyperlipidemia  Hyperlipidemia controlled continue with current medical regiment recommend a low-cholesterol diet and recommend routine exercise we will continue to monitor the progress.  Continue Crestor 10 mg once daily    Overweight (BMI 25.0-29.9)  I have counselled the pt to follow a healthy and balanced diet ,and recommend routine exercise.    Prediabetes  Pre diabetes -I have counseled the patient to follow a healthy balanced diet, I have counseled patient reduce carbohydrates and sweets in the diet, I would like the patient exercise routinely.  I will be checking hemoglobin A1c and comprehensive metabolic panel.  Have counseled patient about the prevention of diabetes, and the risk of progression to type 2 diabetes.    Gastroesophageal reflux disease without esophagitis  Mild GERD symptoms very infrequent no alarm symptoms patient has been using OTC PPI he does not want to undergo EGD at this point we did discuss discontinuing the Nexium and starting Pepcid 20 mg once daily because of long-term side effects of PPI         Problem List Items Addressed This Visit        Digestive    Gastroesophageal reflux disease without esophagitis     Mild GERD symptoms very infrequent no alarm symptoms patient has been using OTC PPI he does not want to undergo EGD at this point we did discuss discontinuing the Nexium and starting Pepcid 20 mg once daily because of long-term side effects of PPI            Genitourinary    BPH with obstruction/lower urinary tract symptoms     Currently stable and doing well routine follow-up with urology            Behavioral Health    Anxiety     Clinically stable and doing well continue the current medical regiment  will continue monitor.  Continue Lexapro 20 mg once daily            Other    Hyperlipidemia - Primary     Hyperlipidemia controlled continue with current medical regiment recommend a low-cholesterol diet and recommend routine exercise we will continue to monitor the progress.  Continue Crestor 10 mg once daily         Relevant Orders    Comprehensive metabolic panel    Lipid Panel with Direct LDL reflex    Prediabetes     Pre diabetes -I have counseled the patient to follow a healthy balanced diet, I have counseled patient reduce carbohydrates and sweets in the diet, I would like the patient exercise routinely.  I will be checking hemoglobin A1c and comprehensive metabolic panel.  Have counseled patient about the prevention of diabetes, and the risk of progression to type 2 diabetes.         Relevant Orders    Hemoglobin A1C    Overweight (BMI 25.0-29.9)     I have counselled the pt to follow a healthy and balanced diet ,and recommend routine exercise.              RTO in 6 months call if any problems  Subjective:      Patient ID: Caleb Tejada is a 63 y.o. male.  Mild et  HPI 63-year old male coming in for a follow up visit regarding hyperlipidemia, prediabetes, anxiety, BPH, overweight and GERD; the patient reports me compliant taking medications without untoward side effects the.  The patient is here to review his medical condition, update me on the medical condition and the patient reports me no hospitalizations and no ER visits.  No injuries no illnesses reported by walking daily outside.  Following healthy balanced diet here to review laboratories in detail reporting mild intermittent GERD no dysphagia no blood in stool no unexplained weight loss approximately 1 time per month uses OTC Nexium gerd 1/month for past , using otc   no alarm sx.  Patient does report to me mild essential tremor which has been relatively chronic possibly related to the SSRI he does not want to stop the Lexapro at this point in  time the tremor is very mild he like to observe he would like to observe at this point the benefits of Lexapro outweigh the risk and he is doing very well with the Lexapro.    The following portions of the patient's history were reviewed and updated as appropriate: allergies, current medications, past family history, past medical history, past social history, past surgical history and problem list.    Review of Systems   Constitutional:  Negative for activity change, appetite change and unexpected weight change.   HENT:  Negative for congestion and postnasal drip.    Eyes:  Negative for visual disturbance.   Respiratory:  Negative for cough and shortness of breath.    Cardiovascular:  Negative for chest pain.   Gastrointestinal:  Negative for abdominal pain, diarrhea, nausea and vomiting.   Neurological:  Negative for dizziness, light-headedness and headaches.   Hematological:  Negative for adenopathy.    GERD    Objective:    Return in about 6 months (around 11/21/2024).    No results found.      Allergies   Allergen Reactions   • Bee Venom Edema   • Celecoxib Hives     Reaction Date: 21Nov2011; Category: Allergy;        Past Medical History:   Diagnosis Date   • Cancer (HCC)    • COVID-19    • Depression 11/2004   • Hyperlipidemia    • Hypertension don't remember   • Malignant melanoma of skin (HCC)     last assessed 10/25/17, resolved 4/20/15     Past Surgical History:   Procedure Laterality Date   • COLONOSCOPY  2012    Dr. Castorena   Impression 1/12/15   • HEMORROIDECTOMY     • SKIN LESION EXCISION Right     Elbow    • TONSILECTOMY AND ADNOIDECTOMY       Current Outpatient Medications on File Prior to Visit   Medication Sig Dispense Refill   • amLODIPine (NORVASC) 5 mg tablet TAKE 2 TABLETS BY MOUTH EVERY MORNING 180 tablet 1   • Ascorbic Acid (VITAMIN C) 1000 MG tablet Take 1 tablet by mouth daily     • EPINEPHrine (EpiPen 2-Sherman) 0.3 mg/0.3 mL SOAJ Inject 0.3 mL (0.3 mg total) into a muscle once for 1 dose 1 each  1   • escitalopram (LEXAPRO) 20 mg tablet TAKE 1 TABLET BY MOUTH EVERY DAY 90 tablet 1   • rosuvastatin (CRESTOR) 10 MG tablet TAKE 1 TABLET BY MOUTH EVERY DAY 90 tablet 1     No current facility-administered medications on file prior to visit.     Family History   Problem Relation Age of Onset   • Diabetes Mother    • Skin cancer Mother    • Basal cell carcinoma Mother    • Arthritis Mother    • Hearing loss Mother    • Coronary artery disease Father    • Heart disease Father      Social History     Socioeconomic History   • Marital status: /Civil Union     Spouse name: Not on file   • Number of children: Not on file   • Years of education: Not on file   • Highest education level: Not on file   Occupational History   • Not on file   Tobacco Use   • Smoking status: Never   • Smokeless tobacco: Never   Vaping Use   • Vaping status: Never Used   Substance and Sexual Activity   • Alcohol use: Yes     Alcohol/week: 4.0 standard drinks of alcohol     Types: 2 Glasses of wine, 2 Cans of beer per week     Comment: Social    • Drug use: No   • Sexual activity: Yes     Partners: Female   Other Topics Concern   • Not on file   Social History Narrative   • Not on file     Social Determinants of Health     Financial Resource Strain: Low Risk  (11/7/2023)    Overall Financial Resource Strain (CARDIA)    • Difficulty of Paying Living Expenses: Not hard at all   Food Insecurity: Not on file   Transportation Needs: No Transportation Needs (11/7/2023)    PRAPARE - Transportation    • Lack of Transportation (Medical): No    • Lack of Transportation (Non-Medical): No   Physical Activity: Not on file   Stress: Not on file   Social Connections: Not on file   Intimate Partner Violence: Not on file   Housing Stability: Not on file     Vitals:    05/21/24 0924   BP: 120/76   BP Location: Left arm   Patient Position: Sitting   Cuff Size: Standard   Pulse: 74   Resp: 16   Temp: (!) 97.3 °F (36.3 °C)   TempSrc: Tympanic   SpO2: 98%  "  Weight: 109 kg (240 lb)   Height: 6' 4\" (1.93 m)     Results for orders placed or performed in visit on 05/17/24   Comprehensive metabolic panel   Result Value Ref Range    Sodium 141 135 - 147 mmol/L    Potassium 4.1 3.5 - 5.3 mmol/L    Chloride 104 96 - 108 mmol/L    CO2 27 21 - 32 mmol/L    ANION GAP 10 4 - 13 mmol/L    BUN 26 (H) 5 - 25 mg/dL    Creatinine 0.90 0.60 - 1.30 mg/dL    Glucose, Fasting 95 65 - 99 mg/dL    Calcium 9.2 8.4 - 10.2 mg/dL    AST 28 13 - 39 U/L    ALT 29 7 - 52 U/L    Alkaline Phosphatase 71 34 - 104 U/L    Total Protein 6.8 6.4 - 8.4 g/dL    Albumin 4.4 3.5 - 5.0 g/dL    Total Bilirubin 1.33 (H) 0.20 - 1.00 mg/dL    eGFR 89 ml/min/1.73sq m   Lipid Panel with Direct LDL reflex   Result Value Ref Range    Cholesterol 166 See Comment mg/dL    Triglycerides 72 See Comment mg/dL    HDL, Direct 49 >=40 mg/dL    LDL Calculated 103 (H) 0 - 100 mg/dL     Weight (last 2 days)     Date/Time Weight    05/21/24 0924 109 (240)        Body mass index is 29.21 kg/m².  BP      Temp      Pulse     Resp      SpO2        Vitals:    05/21/24 0924   Weight: 109 kg (240 lb)     Vitals:    05/21/24 0924   Weight: 109 kg (240 lb)       /76 (BP Location: Left arm, Patient Position: Sitting, Cuff Size: Standard)   Pulse 74   Temp (!) 97.3 °F (36.3 °C) (Tympanic)   Resp 16   Ht 6' 4\" (1.93 m)   Wt 109 kg (240 lb)   SpO2 98%   BMI 29.21 kg/m²          Physical Exam  Vitals and nursing note reviewed.   Constitutional:       General: He is not in acute distress.     Appearance: Normal appearance. He is well-developed. He is not ill-appearing, toxic-appearing or diaphoretic.   HENT:      Head: Normocephalic and atraumatic.      Right Ear: External ear normal.      Left Ear: External ear normal.      Mouth/Throat:      Mouth: Oropharynx is clear and moist.   Eyes:      General: No scleral icterus.        Right eye: No discharge.         Left eye: No discharge.      Conjunctiva/sclera: Conjunctivae normal. "      Pupils: Pupils are equal, round, and reactive to light.   Cardiovascular:      Rate and Rhythm: Normal rate and regular rhythm.      Heart sounds: Normal heart sounds. No murmur heard.     No friction rub. No gallop.   Pulmonary:      Effort: No respiratory distress.      Breath sounds: No wheezing or rales.   Abdominal:      General: Bowel sounds are normal. There is no distension.      Palpations: Abdomen is soft. There is no mass.      Tenderness: There is no abdominal tenderness. There is no guarding or rebound.   Musculoskeletal:         General: No deformity or edema.      Cervical back: Neck supple.   Lymphadenopathy:      Cervical: No cervical adenopathy.   Neurological:      Mental Status: He is alert.   Psychiatric:         Mood and Affect: Mood and affect normal. Mood is not anxious or depressed.         Thought Content: Thought content does not include suicidal ideation.

## 2024-05-21 NOTE — ASSESSMENT & PLAN NOTE
Mild GERD symptoms very infrequent no alarm symptoms patient has been using OTC PPI he does not want to undergo EGD at this point we did discuss discontinuing the Nexium and starting Pepcid 20 mg once daily because of long-term side effects of PPI

## 2024-06-07 DIAGNOSIS — Z00.6 ENCOUNTER FOR EXAMINATION FOR NORMAL COMPARISON OR CONTROL IN CLINICAL RESEARCH PROGRAM: ICD-10-CM

## 2024-06-23 DIAGNOSIS — F41.9 ANXIETY: ICD-10-CM

## 2024-06-23 RX ORDER — ESCITALOPRAM OXALATE 20 MG/1
TABLET ORAL
Qty: 90 TABLET | Refills: 1 | Status: SHIPPED | OUTPATIENT
Start: 2024-06-23

## 2024-08-01 ENCOUNTER — APPOINTMENT (OUTPATIENT)
Dept: LAB | Facility: CLINIC | Age: 64
End: 2024-08-01

## 2024-08-01 DIAGNOSIS — Z00.6 ENCOUNTER FOR EXAMINATION FOR NORMAL COMPARISON OR CONTROL IN CLINICAL RESEARCH PROGRAM: ICD-10-CM

## 2024-08-01 PROCEDURE — 36415 COLL VENOUS BLD VENIPUNCTURE: CPT

## 2024-08-14 LAB
APOB+LDLR+PCSK9 GENE MUT ANL BLD/T: NOT DETECTED
BRCA1+BRCA2 DEL+DUP + FULL MUT ANL BLD/T: NOT DETECTED
MLH1+MSH2+MSH6+PMS2 GN DEL+DUP+FUL M: NOT DETECTED

## 2024-08-30 DIAGNOSIS — I10 ESSENTIAL HYPERTENSION: ICD-10-CM

## 2024-08-30 RX ORDER — AMLODIPINE BESYLATE 5 MG/1
TABLET ORAL
Qty: 180 TABLET | Refills: 1 | Status: SHIPPED | OUTPATIENT
Start: 2024-08-30

## 2024-09-18 ENCOUNTER — TELEPHONE (OUTPATIENT)
Age: 64
End: 2024-09-18

## 2024-09-18 DIAGNOSIS — N40.0 BENIGN PROSTATIC HYPERPLASIA WITHOUT LOWER URINARY TRACT SYMPTOMS: Primary | ICD-10-CM

## 2024-09-18 NOTE — TELEPHONE ENCOUNTER
Patient was calling because the lab will not use the PSA order in his chart from 2024. They told him it .     Needs a new lab added to his chart to complete before his upcoming appt on

## 2024-09-20 ENCOUNTER — APPOINTMENT (OUTPATIENT)
Dept: LAB | Facility: CLINIC | Age: 64
End: 2024-09-20
Payer: MEDICARE

## 2024-09-20 DIAGNOSIS — N40.0 BENIGN PROSTATIC HYPERPLASIA WITHOUT LOWER URINARY TRACT SYMPTOMS: ICD-10-CM

## 2024-09-20 LAB
ALBUMIN SERPL BCG-MCNC: 4.3 G/DL (ref 3.5–5)
ALP SERPL-CCNC: 67 U/L (ref 34–104)
ALT SERPL W P-5'-P-CCNC: 26 U/L (ref 7–52)
ANION GAP SERPL CALCULATED.3IONS-SCNC: 6 MMOL/L (ref 4–13)
AST SERPL W P-5'-P-CCNC: 25 U/L (ref 13–39)
BILIRUB SERPL-MCNC: 1.89 MG/DL (ref 0.2–1)
BUN SERPL-MCNC: 23 MG/DL (ref 5–25)
CALCIUM SERPL-MCNC: 9 MG/DL (ref 8.4–10.2)
CHLORIDE SERPL-SCNC: 103 MMOL/L (ref 96–108)
CHOLEST SERPL-MCNC: 152 MG/DL
CO2 SERPL-SCNC: 31 MMOL/L (ref 21–32)
CREAT SERPL-MCNC: 0.99 MG/DL (ref 0.6–1.3)
EST. AVERAGE GLUCOSE BLD GHB EST-MCNC: 105 MG/DL
GFR SERPL CREATININE-BSD FRML MDRD: 79 ML/MIN/1.73SQ M
GLUCOSE P FAST SERPL-MCNC: 99 MG/DL (ref 65–99)
HBA1C MFR BLD: 5.3 %
HDLC SERPL-MCNC: 46 MG/DL
LDLC SERPL CALC-MCNC: 92 MG/DL (ref 0–100)
POTASSIUM SERPL-SCNC: 4.5 MMOL/L (ref 3.5–5.3)
PROT SERPL-MCNC: 6.9 G/DL (ref 6.4–8.4)
SODIUM SERPL-SCNC: 140 MMOL/L (ref 135–147)
TRIGL SERPL-MCNC: 72 MG/DL

## 2024-09-22 DIAGNOSIS — E78.5 HYPERLIPIDEMIA, UNSPECIFIED HYPERLIPIDEMIA TYPE: ICD-10-CM

## 2024-09-23 ENCOUNTER — OFFICE VISIT (OUTPATIENT)
Dept: UROLOGY | Facility: AMBULATORY SURGERY CENTER | Age: 64
End: 2024-09-23
Payer: MEDICARE

## 2024-09-23 VITALS
SYSTOLIC BLOOD PRESSURE: 138 MMHG | DIASTOLIC BLOOD PRESSURE: 74 MMHG | HEART RATE: 69 BPM | WEIGHT: 234 LBS | HEIGHT: 76 IN | OXYGEN SATURATION: 97 % | BODY MASS INDEX: 28.49 KG/M2

## 2024-09-23 DIAGNOSIS — Z12.5 SCREENING FOR PROSTATE CANCER: Primary | ICD-10-CM

## 2024-09-23 PROCEDURE — 99213 OFFICE O/P EST LOW 20 MIN: CPT

## 2024-09-23 RX ORDER — ROSUVASTATIN CALCIUM 10 MG/1
TABLET, COATED ORAL
Qty: 90 TABLET | Refills: 1 | Status: SHIPPED | OUTPATIENT
Start: 2024-09-23

## 2024-09-23 RX ORDER — FAMOTIDINE 10 MG
10 TABLET ORAL
COMMUNITY

## 2024-09-23 NOTE — PROGRESS NOTES
9/23/2024      Assessment and Plan    64 y.o. male managed by Dr. Hardy    Screening for prostate cancer  Patient previously underwent multiparametric MRI of the prostate performed 11/11/2021 which returned as PI-RADS category 2  PSA performed 9/20/2024 returned at a value of 2.403.  Refer to PSA trend below.  MICHELLE performed today.  Palpably benign prostate.  Refer to physical exam findings.  Patient will repeat PSA in 1 year from his last and follow-up in the office at that time and undergo MICHELLE.    Lab Results   Component Value Date    PSA 2.403 09/20/2024    PSA 3.06 09/11/2023    PSA 2.6 09/08/2022            History of Present Illness  Caleb Tejada is a 64 y.o. male here for evaluation of prostate cancer screening and BPH with obstruction/lower urinary tract symptoms.  Patient was last seen in the office on 9/14/2023.  Patient previously underwent multiparametric MRI of the prostate performed 11/11/2021 which returned as PI-RADS category 2 and a prostate volume of 44.2 g.  Patient's most recent PSA was performed 9/20/2024 returned at a value of 2.403.  Patient is not currently on any pharmacotherapy for his lower urinary tract symptoms.  AUA symptom score 6.  Today, the patient offers no new lower urinary tract complaints and is currently content with his voiding pattern.  Patient denies dysuria, hematuria, flank pain, worsening urinary frequency/urgency, or postvoid dribbling.        Review of Systems   Constitutional:  Negative for chills and fever.   HENT:  Negative for ear pain and sore throat.    Eyes:  Negative for pain and visual disturbance.   Respiratory:  Negative for cough and shortness of breath.    Cardiovascular:  Negative for chest pain and palpitations.   Gastrointestinal:  Negative for abdominal pain and vomiting.   Genitourinary:  Negative for decreased urine volume, difficulty urinating, dysuria, flank pain, frequency, hematuria and urgency.   Musculoskeletal:  Negative for arthralgias  "and back pain.   Skin:  Negative for color change and rash.   Neurological:  Negative for seizures and syncope.   All other systems reviewed and are negative.          AUA SYMPTOM SCORE      Flowsheet Row Most Recent Value   AUA SYMPTOM SCORE    How often have you had a sensation of not emptying your bladder completely after you finished urinating? 1 (P)     How often have you had to urinate again less than two hours after you finished urinating? 2 (P)     How often have you found you stopped and started again several times when you urinate? 1 (P)     How often have you found it difficult to postpone urination? 1 (P)     How often have you had a weak urinary stream? 0 (P)     How often have you had to push or strain to begin urination? 0 (P)     How many times did you most typically get up to urinate from the time you went to bed at night until the time you got up in the morning? 1 (P)     Quality of Life: If you were to spend the rest of your life with your urinary condition just the way it is now, how would you feel about that? 1 (P)     AUA SYMPTOM SCORE 6 (P)               Vitals  Vitals:    09/23/24 0853   BP: 138/74   BP Location: Left arm   Patient Position: Sitting   Cuff Size: Standard   Pulse: 69   SpO2: 97%   Weight: 106 kg (234 lb)   Height: 6' 4\" (1.93 m)       Physical Exam  Vitals reviewed.   Constitutional:       General: He is not in acute distress.     Appearance: Normal appearance. He is not ill-appearing.   HENT:      Head: Normocephalic and atraumatic.      Nose: Nose normal.   Eyes:      General: No scleral icterus.  Pulmonary:      Effort: No respiratory distress.   Abdominal:      General: Abdomen is flat. There is no distension.      Palpations: Abdomen is soft.      Tenderness: There is no abdominal tenderness.   Genitourinary:     Comments: Normal phallus, testes descended bilaterally and smooth without nodularity.  MICHELLE performed today.  Prostate estimated to be about 45 g and smooth " bilaterally without nodularity or induration.  Musculoskeletal:         General: Normal range of motion.      Cervical back: Normal range of motion.   Skin:     General: Skin is warm.      Coloration: Skin is not jaundiced.   Neurological:      Mental Status: He is alert and oriented to person, place, and time.      Gait: Gait normal.   Psychiatric:         Mood and Affect: Mood normal.         Behavior: Behavior normal.           Past History  Past Medical History:   Diagnosis Date    Cancer (HCC)     COVID-19     Depression 11/2004    Hyperlipidemia     Hypertension don't remember    Malignant melanoma of skin (HCC)     last assessed 10/25/17, resolved 4/20/15     Social History     Socioeconomic History    Marital status: /Civil Union     Spouse name: None    Number of children: None    Years of education: None    Highest education level: None   Occupational History    None   Tobacco Use    Smoking status: Never    Smokeless tobacco: Never   Vaping Use    Vaping status: Never Used   Substance and Sexual Activity    Alcohol use: Yes     Alcohol/week: 4.0 standard drinks of alcohol     Types: 2 Glasses of wine, 2 Cans of beer per week     Comment: Social     Drug use: No    Sexual activity: Yes     Partners: Female   Other Topics Concern    None   Social History Narrative    None     Social Determinants of Health     Financial Resource Strain: Low Risk  (11/7/2023)    Overall Financial Resource Strain (CARDIA)     Difficulty of Paying Living Expenses: Not hard at all   Food Insecurity: Not on file   Transportation Needs: No Transportation Needs (11/7/2023)    PRAPARE - Transportation     Lack of Transportation (Medical): No     Lack of Transportation (Non-Medical): No   Physical Activity: Not on file   Stress: Not on file   Social Connections: Not on file   Intimate Partner Violence: Not on file   Housing Stability: Not on file     Social History     Tobacco Use   Smoking Status Never   Smokeless Tobacco  Never     Family History   Problem Relation Age of Onset    Diabetes Mother     Skin cancer Mother     Basal cell carcinoma Mother     Arthritis Mother     Hearing loss Mother     Coronary artery disease Father     Heart disease Father        The following portions of the patient's history were reviewed and updated as appropriate: allergies, current medications, past medical history, past social history, past surgical history and problem list.    Results  No results found for this or any previous visit (from the past 1 hour(s)).]  Lab Results   Component Value Date    PSA 2.403 09/20/2024    PSA 3.06 09/11/2023    PSA 2.6 09/08/2022    PSA 2.7 10/05/2021     Lab Results   Component Value Date    GLUCOSE 85 11/05/2015    CALCIUM 9.0 09/20/2024     11/05/2015    K 4.5 09/20/2024    CO2 31 09/20/2024     09/20/2024    BUN 23 09/20/2024    CREATININE 0.99 09/20/2024     Lab Results   Component Value Date    WBC 6.68 08/14/2018    HGB 14.9 08/14/2018    HCT 42.3 08/14/2018    MCV 89 08/14/2018     08/14/2018

## 2024-09-23 NOTE — ASSESSMENT & PLAN NOTE
Patient previously underwent multiparametric MRI of the prostate performed 11/11/2021 which returned as PI-RADS category 2  PSA performed 9/20/2024 returned at a value of 2.403.  Refer to PSA trend below.  MICHELLE performed today.  Palpably benign prostate.  Refer to physical exam findings.  Patient will repeat PSA in 1 year from his last and follow-up in the office at that time and undergo MICHELLE.    Lab Results   Component Value Date    PSA 2.403 09/20/2024    PSA 3.06 09/11/2023    PSA 2.6 09/08/2022

## 2024-10-23 PROBLEM — Z12.5 SCREENING FOR PROSTATE CANCER: Status: RESOLVED | Noted: 2024-09-23 | Resolved: 2024-10-23

## 2024-11-13 ENCOUNTER — OFFICE VISIT (OUTPATIENT)
Dept: INTERNAL MEDICINE CLINIC | Facility: CLINIC | Age: 64
End: 2024-11-13
Payer: MEDICARE

## 2024-11-13 VITALS
BODY MASS INDEX: 29.21 KG/M2 | DIASTOLIC BLOOD PRESSURE: 64 MMHG | WEIGHT: 240 LBS | OXYGEN SATURATION: 98 % | SYSTOLIC BLOOD PRESSURE: 114 MMHG | HEART RATE: 76 BPM

## 2024-11-13 DIAGNOSIS — I10 ESSENTIAL HYPERTENSION: ICD-10-CM

## 2024-11-13 DIAGNOSIS — R73.03 PREDIABETES: ICD-10-CM

## 2024-11-13 DIAGNOSIS — Z23 ENCOUNTER FOR IMMUNIZATION: Primary | ICD-10-CM

## 2024-11-13 DIAGNOSIS — Z00.00 MEDICARE ANNUAL WELLNESS VISIT, SUBSEQUENT: ICD-10-CM

## 2024-11-13 DIAGNOSIS — E78.5 HYPERLIPIDEMIA, UNSPECIFIED HYPERLIPIDEMIA TYPE: ICD-10-CM

## 2024-11-13 DIAGNOSIS — R01.1 HEART MURMUR: ICD-10-CM

## 2024-11-13 DIAGNOSIS — E66.3 OVERWEIGHT (BMI 25.0-29.9): ICD-10-CM

## 2024-11-13 PROCEDURE — 90662 IIV NO PRSV INCREASED AG IM: CPT

## 2024-11-13 PROCEDURE — 99213 OFFICE O/P EST LOW 20 MIN: CPT | Performed by: INTERNAL MEDICINE

## 2024-11-13 PROCEDURE — G0008 ADMIN INFLUENZA VIRUS VAC: HCPCS

## 2024-11-13 PROCEDURE — G0439 PPPS, SUBSEQ VISIT: HCPCS | Performed by: INTERNAL MEDICINE

## 2024-11-13 NOTE — PROGRESS NOTES
Ambulatory Visit  Name: Caleb Tejada      : 1960      MRN: [id number]  Encounter Provider: Tobin Qiu DO  Encounter Date: 2024   [facility]: [facility]    Assessment & Plan  Encounter for immunization    Orders:    influenza vaccine, high-dose, PF 0.5 mL (Fluzone High Dose)    Essential hypertension  Hypertension - controlled, I have counseled patient following healthy balance diet, I would like the patient reduce sodium, exercise routinely, I would like the patient continued the med current medical regiment and we will continue to monitor.  Continue amlodipine 5 mg 2 tablets daily       Hyperlipidemia, unspecified hyperlipidemia type  Hyperlipidemia controlled continue with current medical regiment recommend a low-cholesterol diet and recommend routine exercise we will continue to monitor the progress.  Continue Crestor 10 mg once daily  Orders:    Comprehensive metabolic panel; Future    Lipid Panel with Direct LDL reflex; Future    Prediabetes  Pre diabetes -I have counseled the patient to follow a healthy balanced diet, I have counseled patient reduce carbohydrates and sweets in the diet, I would like the patient exercise routinely.  I will be checking hemoglobin A1c and comprehensive metabolic panel.  Have counseled patient about the prevention of diabetes, and the risk of progression to type 2 diabetes.  Laboratories reviewed  Orders:    Hemoglobin A1C; Future    Overweight (BMI 25.0-29.9)  I have counselled the pt to follow a healthy and balanced diet ,and recommend routine exercise.         Medicare annual wellness visit, subsequent  Assessment and plan 1.  Medicare subsequentannual wellness examination overall the patient is clinically stable and doing well, we encouraged the patient to follow a healthy and balanced diet.  We recommend that the patient exercise routinely approximately 30 minutes 5 times per week .  We have reviewed the patient's  vaccines and have made recommendations for updates if necessary      .  We will be ordering screening laboratories which are age appropriate.  Return to the office in    6 months call if any problems.       Heart murmur  Mild heart murmur 1/6 asymptomatic I did discuss possibly going for echo at this point in time the patient would like to hold off but we will observe and monitor if he develops any symptoms such as shortness of breath or chest pain he will notify me         Depression Screening and Follow-up Plan: Patient was screened for depression during today's encounter. They screened negative with a PHQ-2 score of 0.      Preventive health issues were discussed with patient, and age appropriate screening tests were ordered as noted in patient's After Visit Summary. Personalized health advice and appropriate referrals for health education or preventive services given if needed, as noted in patient's After Visit Summary.    History of Present Illness     HPI 64-year old male coming in for a follow up visit regarding essential hypertension, hyperlipidemia, prediabetes, overweight; the patient reports me compliant taking medications without untoward side effects the.  The patient is here to review his medical condition, update me on the medical condition and the patient reports me no hospitalizations and no ER visits.  No injuries no illnesses trying to follow healthy balanced diet remains active he is walking routinely he is here to review his laboratories in detail overall he is doing very well we did notice a murmur on today's examination there is no chest pain palpitation racing heart no murmur is a 1/6 at this point in time the patient like to observe he would like to hold off on echocardiogram at this point but in the future if the murmur worsens or he develops any symptoms he will consider  Patient Care Team:  Tobin Qiu DO as PCP - General  [facility]obin Qiu DO [facility]  Regina-MD Tobin Tamayo DO Roderick Quiros, MD Frank Jeremy Tamarkin, MD    Review of Systems   Constitutional:  Negative for activity change, appetite change and unexpected weight change.   HENT:  Negative for congestion and postnasal drip.    Eyes:  Negative for visual disturbance.   Respiratory:  Negative for cough and shortness of breath.    Cardiovascular:  Negative for chest pain.   Gastrointestinal:  Negative for abdominal pain, diarrhea, nausea and vomiting.   Neurological:  Negative for dizziness, light-headedness and headaches.   Hematological:  Negative for adenopathy.     Medical History Reviewed by provider this encounter:       Annual Wellness Visit Questionnaire   Caleb is here for his Subsequent Wellness visit.     Health Risk Assessment:   Patient rates overall health as very good. Patient feels that their physical health rating is same. Patient is very satisfied with their life. Eyesight was rated as same. Hearing was rated as same. Patient feels that their emotional and mental health rating is same. Patients states they are never, rarely angry. Patient states they are never, rarely unusually tired/fatigued. Pain experienced in the last 7 days has been none. Patient states that he has experienced no weight loss or gain in last 6 months.     Depression Screening:   PHQ-2 Score: 0      Fall Risk Screening:   In the past year, patient has experienced: history of falling in past year    Number of falls: 1  Injured during fall?: No    Feels unsteady when standing or walking?: No    Worried about falling?: Yes      Home Safety:  Patient does not have trouble with stairs inside or outside of their home. Patient has working smoke alarms and has working carbon monoxide detector. Home safety hazards include: none.     Nutrition:   Current diet is Regular.     Medications:   Patient is currently taking over-the-counter supplements. OTC medications include: see medication  list. Patient is able to manage medications.     Activities of Daily Living (ADLs)/Instrumental Activities of Daily Living (IADLs):   Walk and transfer into and out of bed and chair?: Yes  Dress and groom yourself?: Yes    Bathe or shower yourself?: Yes    Feed yourself? Yes  Do your laundry/housekeeping?: Yes  Manage your money, pay your bills and track your expenses?: Yes  Make your own meals?: Yes    Do your own shopping?: Yes    Previous Hospitalizations:   Any hospitalizations or ED visits within the last 12 months?: No      Advance Care Planning:   Living will: Yes    Durable POA for healthcare: Yes    Advanced directive: Yes      Cognitive Screening:   Provider or family/friend/caregiver concerned regarding cognition?: No    PREVENTIVE SCREENINGS      Cardiovascular Screening:    General: Screening Not Indicated and History Lipid Disorder      Diabetes Screening:     General: Screening Current      Colorectal Cancer Screening:     General: Screening Current      Prostate Cancer Screening:    General: Screening Current      Abdominal Aortic Aneurysm (AAA) Screening:    Risk factors include: age between 65-74 yo        Lung Cancer Screening:     General: Screening Not Indicated      Hepatitis C Screening:    General: Screening Current    Screening, Brief Intervention, and Referral to Treatment (SBIRT)    Screening  Typical number of drinks in a day: 0  Typical number of drinks in a week: 4  Interpretation: Low risk drinking behavior.    AUDIT-C Screenin) How often did you have a drink containing alcohol in the past year? 2 to 3 times a week  2) How many drinks did you have on a typical day when you were drinking in the past year? 1 to 2  3) How often did you have 6 or more drinks on one occasion in the past year? never    AUDIT-C Score: 3  Interpretation: Score 0-3 (male): Negative screen for alcohol misuse    Single Item Drug Screening:  How often have you used an illegal drug (including marijuana) or a  prescription medication for non-medical reasons in the past year? never    Single Item Drug Screen Score: 0  Interpretation: Negative screen for possible drug use disorder    Social Drivers of Health     Financial Resource Strain: Low Risk  (11/7/2023)    Overall Financial Resource Strain (CARDIA)     Difficulty of Paying Living Expenses: Not hard at all   Food Insecurity: No Food Insecurity (11/13/2024)    Hunger Vital Sign     Worried About Running Out of Food in the Last Year: Never true     Ran Out of Food in the Last Year: Never true   Transportation Needs: No Transportation Needs (11/13/2024)    PRAPARE - Transportation     Lack of Transportation (Medical): No     Lack of Transportation (Non-Medical): No   Housing Stability: Low Risk  (11/13/2024)    Housing Stability Vital Sign     Unable to Pay for Housing in the Last Year: No     Number of Times Moved in the Last Year: 1     Homeless in the Last Year: No   Utilities: Not At Risk (11/13/2024)    [facility] Utilities     Threatened with loss of utilities: No     No results found.    Objective     /64   Pulse 76   Wt 109 kg (240 lb)   SpO2 98%   BMI 29.21 kg/m²   1/6 systolic ejection murmur  Physical Exam  Vitals and nursing note reviewed.   Constitutional:       General: He is not in acute distress.     Appearance: Normal appearance. He is well-developed. He is not ill-appearing, toxic-appearing or diaphoretic.   HENT:      Head: Normocephalic and atraumatic.      Right Ear: External ear normal.      Left Ear: External ear normal.      Nose: Nose normal.   Eyes:      Pupils: Pupils are equal, round, and reactive to light.   Cardiovascular:      Rate and Rhythm: Normal rate and regular rhythm.      Heart sounds: Murmur heard.   Pulmonary:      Effort: Pulmonary effort is normal.      Breath sounds: Normal breath sounds.   Abdominal:      General: There is no distension.      Palpations: Abdomen is soft.      Tenderness: There is no abdominal tenderness.  There is no guarding.   Neurological:      Mental Status: He is alert.

## 2024-11-17 PROBLEM — Z00.00 MEDICARE ANNUAL WELLNESS VISIT, SUBSEQUENT: Status: ACTIVE | Noted: 2024-11-17

## 2024-11-17 NOTE — ASSESSMENT & PLAN NOTE
I have counselled the pt to follow a healthy and balanced diet ,and recommend routine exercise.

## 2024-11-17 NOTE — ASSESSMENT & PLAN NOTE
Hypertension - controlled, I have counseled patient following healthy balance diet, I would like the patient reduce sodium, exercise routinely, I would like the patient continued the med current medical regiment and we will continue to monitor.  Continue amlodipine 5 mg 2 tablets daily

## 2024-11-17 NOTE — ASSESSMENT & PLAN NOTE
Hyperlipidemia controlled continue with current medical regiment recommend a low-cholesterol diet and recommend routine exercise we will continue to monitor the progress.  Continue Crestor 10 mg once daily  Orders:    Comprehensive metabolic panel; Future    Lipid Panel with Direct LDL reflex; Future

## 2024-11-17 NOTE — ASSESSMENT & PLAN NOTE
Assessment and plan 1.  Medicare subsequentannual wellness examination overall the patient is clinically stable and doing well, we encouraged the patient to follow a healthy and balanced diet.  We recommend that the patient exercise routinely approximately 30 minutes 5 times per week .  We have reviewed the patient's vaccines and have made recommendations for updates if necessary      .  We will be ordering screening laboratories which are age appropriate.  Return to the office in    6 months call if any problems.

## 2024-11-17 NOTE — ASSESSMENT & PLAN NOTE
Pre diabetes -I have counseled the patient to follow a healthy balanced diet, I have counseled patient reduce carbohydrates and sweets in the diet, I would like the patient exercise routinely.  I will be checking hemoglobin A1c and comprehensive metabolic panel.  Have counseled patient about the prevention of diabetes, and the risk of progression to type 2 diabetes.  Laboratories reviewed  Orders:    Hemoglobin A1C; Future

## 2024-12-17 PROBLEM — Z00.00 MEDICARE ANNUAL WELLNESS VISIT, SUBSEQUENT: Status: RESOLVED | Noted: 2024-11-17 | Resolved: 2024-12-17

## 2024-12-29 DIAGNOSIS — F41.9 ANXIETY: ICD-10-CM

## 2024-12-30 RX ORDER — ESCITALOPRAM OXALATE 20 MG/1
20 TABLET ORAL DAILY
Qty: 90 TABLET | Refills: 1 | Status: SHIPPED | OUTPATIENT
Start: 2024-12-30

## 2025-02-27 ENCOUNTER — OFFICE VISIT (OUTPATIENT)
Dept: OBGYN CLINIC | Facility: OTHER | Age: 65
End: 2025-02-27
Payer: MEDICARE

## 2025-02-27 ENCOUNTER — APPOINTMENT (OUTPATIENT)
Dept: RADIOLOGY | Facility: OTHER | Age: 65
End: 2025-02-27
Payer: COMMERCIAL

## 2025-02-27 VITALS — HEIGHT: 76 IN | BODY MASS INDEX: 29.22 KG/M2 | WEIGHT: 240 LBS

## 2025-02-27 DIAGNOSIS — M54.12 RADICULOPATHY, CERVICAL REGION: Primary | ICD-10-CM

## 2025-02-27 DIAGNOSIS — M25.511 RIGHT SHOULDER PAIN, UNSPECIFIED CHRONICITY: ICD-10-CM

## 2025-02-27 PROCEDURE — 73030 X-RAY EXAM OF SHOULDER: CPT

## 2025-02-27 PROCEDURE — 99213 OFFICE O/P EST LOW 20 MIN: CPT | Performed by: ORTHOPAEDIC SURGERY

## 2025-02-27 NOTE — ASSESSMENT & PLAN NOTE
He has a benign exam of his right shoulder, and there are no acute findings on x-rays of his shoulder.  Patient does not have any complaints related to the shoulder joint itself. Given the absence of shoulder symptoms, and the radiation of pain I believe the patient's symptoms are likely related to cervical spine pathology.  Discussion was had with the patient regarding neck step of treatment including physical therapy, and a referral to spine and pain management.  Patient was advised he is welcome to return to our clinic if he has any new onset right shoulder pain.  He may follow-up with us as needed.      Orders:    XR shoulder 2+ vw right; Future    Ambulatory referral to Spine & Pain Management; Future    Ambulatory referral to Physical Therapy; Future

## 2025-02-27 NOTE — PROGRESS NOTES
"I personally examined the patient and reviewed the history provided.  I agree with the note and the assessment and plan by Dr. Demarcus Dickerson MD.     Assessment & Plan  Radiculopathy, cervical region    He has a benign exam of his right shoulder, and there are no acute findings on x-rays of his shoulder.  Patient does not have any complaints related to the shoulder joint itself. Given the absence of shoulder symptoms, and the radiation of pain I believe the patient's symptoms are likely related to cervical spine pathology.  Discussion was had with the patient regarding neck step of treatment including physical therapy, and a referral to spine and pain management.  Patient was advised he is welcome to return to our clinic if he has any new onset right shoulder pain.  He may follow-up with us as needed.      Orders:    XR shoulder 2+ vw right; Future    Ambulatory referral to Spine & Pain Management; Future    Ambulatory referral to Physical Therapy; Future      Subjective:   Patient ID: Caleb Tejada is a 64 y.o. male      Patient complains of months of atraumatic pain in scapula posterior and inferior to shoulder joint. Patient states it radiates into head. He denies pain in the right shoulder itself. He's tried OTC anti-inflammatory medications. Denies weakness, numbness/tingling.        The following portions of the patient's history were reviewed and updated as appropriate: allergies, current medications, past family history, past medical history, past social history, past surgical history and problem list.    Review of Systems    Objective:  Ht 6' 4\" (1.93 m)   Wt 109 kg (240 lb)   BMI 29.21 kg/m²       Right Shoulder Exam   Right shoulder exam is normal.    Tenderness   The patient is experiencing no tenderness (Tenderness to palpation over posterior scapula inferior to shoulder joint.).    Range of Motion   The patient has normal right shoulder ROM.    Muscle Strength   The patient has normal right shoulder " strength.  Abduction: 5/5   Internal rotation: 5/5   External rotation: 5/5     Tests   Apprehension: negative  Cross arm: negative  Drop arm: negative    Other   Erythema: absent  Scars: absent            Physical Exam      I have personally reviewed pertinent films in PACS and my interpretation is as follows.    Mild degenerative changes of the glenohumeral joint. No evidence of fracture/dislocation.      Records Reviewed: historical medical records, office notes, radiology reports, and x-ray reports

## 2025-03-04 ENCOUNTER — EVALUATION (OUTPATIENT)
Dept: PHYSICAL THERAPY | Facility: CLINIC | Age: 65
End: 2025-03-04
Payer: COMMERCIAL

## 2025-03-04 DIAGNOSIS — M54.12 RADICULOPATHY, CERVICAL REGION: ICD-10-CM

## 2025-03-04 PROCEDURE — 97140 MANUAL THERAPY 1/> REGIONS: CPT | Performed by: PHYSICAL THERAPIST

## 2025-03-04 PROCEDURE — 97161 PT EVAL LOW COMPLEX 20 MIN: CPT | Performed by: PHYSICAL THERAPIST

## 2025-03-04 NOTE — PROGRESS NOTES
PT Evaluation     Today's date: 3/4/2025  Patient name: Caleb Tejada  : 1960  MRN: [id number]  Referring provider: Frederic Dee*  Dx:   Encounter Diagnosis     ICD-10-CM    1. Radiculopathy, cervical region  M54.12 Ambulatory referral to Physical Therapy                     Assessment  Impairments: abnormal or restricted ROM, impaired physical strength, lacks appropriate home exercise program, pain with function and poor posture   Symptom irritability: low    Assessment details: Pt is a 64 y.o. male presenting to PT with chronic R scapular pain with radiating symptoms into the R temple. PT findings include: limited cervical ROM, strength deficits of periscapular mm, cervical joint mobility deficits. Repeated motions is unremarkable today. Pt presents with rounded shoulder posture with pec minor tightness, thoracic hypomobility. Pt responds well to cervical central nerve glides which eliminates pain in the temple. PT findings do appear consistent with cervical radiculopathy. Pt educated on PT findings, anatomy, biomechanics, POC and rehab course. Plan will include neurodynamics, posture, stretches to allow decreased irritation. Pt will benefit from skilled PT to address above impairments to return to PLOF with less restriction and to reach functional goals.   Understanding of Dx/Px/POC: good     Prognosis: good    Goals  1. Pt will demonstrate understanding of HEP and POC in order to improve compliance with course of rehab in 2 weeks.  2. Pt will demonstrate awareness of appropriate posture with seated, standing and functional dynamic reaching activities in order to decrease excessive loads/pain with ADL's in 3 weeks.   3. Pt's pain will be 2/10 or less to allow pt to return to PLOF with least restriction by d/c.   4. Pt's cervical ROM will improve to WNL allowing pt to drive without issues by d/c.       Plan  Patient would benefit from: skilled physical therapy  Planned modality  interventions: low level laser therapy    Planned therapy interventions: joint mobilization, manual therapy, neuromuscular re-education, strengthening, stretching, therapeutic activities, therapeutic exercise, home exercise program, functional ROM exercises and flexibility    Frequency: 2x week  Duration in weeks: 6  Treatment plan discussed with: patient        Subjective Evaluation    History of Present Illness  Mechanism of injury: Patient complains of months of pain in the medial aspect of the scapula and radiating into the R temple. 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 He states that using the mouse and keyboard does cause him pain. He denies pain in the right shoulder itself. Denies weakness, numbness/tingling. Huyen helps with his pain.        Quality of life: good    Patient Goals  Patient goals for therapy: decreased pain, increased motion, increased strength and independence with ADLs/IADLs    Pain  Current pain rating: 3  At best pain ratin  At worst pain ratin  Location: R periscapluar  Quality: discomfort and tight          Objective    Flowsheet Rows      Flowsheet Row Most Recent Value   PT/OT G-Codes    Current Score 69   Projected Score 76        cervical ROM:   Flex: 50°  Ext: 55°  SB: 15°/10°    Thoracic mobility:  Hypo PA T1-T12    Cervical mobility:  UPA: hypo C2/C3-C7/T1 B     Pec minor tightness B   Tenderness rhomboid, midtrap  B 1st rib elevation (tenderness/pain R 1st rib/UT)    Strength (R):  ER: 5/5  IR: 5/5  Flex: 5/5  ABD; 5/5  Rhomboid: 5/5 (some discomfort recreated)    Repeated/sustained motions:  Status quo all directions.     Posture: forward head/rounded shoulders     Precautions: None    Access Code: [email or web address]  URL: [email or web address]  Date: 2025  Prepared by: Emile Chu      Manuals 3/            Cervical central nerve gliders SG Gr I B            Thoracic PA              Laser  R cervical                          Neuro Re-Ed                                                                                                        Ther Ex             Row             Shoulder ext             Shoulder rolls backwards HEP            Pec major stretch HEP            Cervical isometric HEP            Scapular retraction HEP            Prone periscapular lift T, ext            Chin tuck             Ther Activity                                       Gait Training                                       Modalities

## 2025-03-10 ENCOUNTER — OFFICE VISIT (OUTPATIENT)
Dept: PHYSICAL THERAPY | Facility: CLINIC | Age: 65
End: 2025-03-10
Payer: COMMERCIAL

## 2025-03-10 DIAGNOSIS — M54.12 RADICULOPATHY, CERVICAL REGION: Primary | ICD-10-CM

## 2025-03-10 PROCEDURE — 97110 THERAPEUTIC EXERCISES: CPT | Performed by: PHYSICAL THERAPIST

## 2025-03-10 PROCEDURE — 97140 MANUAL THERAPY 1/> REGIONS: CPT | Performed by: PHYSICAL THERAPIST

## 2025-03-10 NOTE — PROGRESS NOTES
"Daily Note     Today's date: 3/10/2025  Patient name: Caleb Tejada  : 1960  MRN: [id number]  Referring provider: Frederic Dee*  Dx:   Encounter Diagnosis     ICD-10-CM    1. Radiculopathy, cervical region  M54.12                      Subjective: Pt reports that yesterday he had no pain at all throughout the entire day. Today he woke up with some pain; he does not report a pattern to his pain. He states usually he will wake up with pain.       Objective: See treatment diary below      Assessment: Pt tolerates treatment well with no complaints during therapeutic exercises. Pt does have pain/stiffness with thoracic PA and palpation to the R rhomboid. Pt able to tolerate manual treatments for cervical spine. Will monitor pt's response and continue per ability. Pt will benefit from continued skilled PT.       Plan: Continue per plan of care.      Precautions: None    Access Code: [email or web address]  URL: [email or web address]  Date: 2025  Prepared by: Emile Chu      Manuals 3/4 3/10           Cervical central nerve gliders SG Gr I B DL            Thoracic PA  DL Gr II-III           Laser  R cervical                          Neuro Re-Ed                                                                                                        Ther Ex             Row  [place] 20R 3x10           Shoulder ext  [place] 13R 3x10           Shoulder rolls backwards HEP            Thoracic ext in seated  W/ pec stretch 5\"x10            Pec major stretch HEP 3x30\"           Cervical isometric HEP 5\"x10 flex/ext/SB           Scapular retraction HEP            Thoracic foam protocol             Prone periscapular lift T, ext 3x10 ea.            Bent over row  NV focus scap ret.                         Chin tuck  Seated  10x5\"           Ther Activity                                       Gait Training                                       Modalities                                            "

## 2025-03-13 ENCOUNTER — OFFICE VISIT (OUTPATIENT)
Dept: PHYSICAL THERAPY | Facility: CLINIC | Age: 65
End: 2025-03-13
Payer: COMMERCIAL

## 2025-03-13 DIAGNOSIS — M54.12 RADICULOPATHY, CERVICAL REGION: Primary | ICD-10-CM

## 2025-03-13 PROCEDURE — 97110 THERAPEUTIC EXERCISES: CPT | Performed by: PHYSICAL THERAPIST

## 2025-03-13 PROCEDURE — 97140 MANUAL THERAPY 1/> REGIONS: CPT | Performed by: PHYSICAL THERAPIST

## 2025-03-13 NOTE — PROGRESS NOTES
"Daily Note     Today's date: 3/13/2025  Patient name: Caleb Tejada  : 1960  MRN: [id number]  Referring provider: Frederic Dee*  Dx:   Encounter Diagnosis     ICD-10-CM    1. Radiculopathy, cervical region  M54.12                      Subjective: Pt reports that he is doing better. He reports no pain at all yesterday. Minimal pain today in the L neck but no pain in the shoulder blade.       Objective: See treatment diary below      Assessment: Pt tolerates treatment well with good response to treatment last visit. Added bent over row with good ability. Continue with progressing periscapular strengthening and manual treatments. At conclusion of PT session, he did not have any pain.       Plan: Continue per plan of care.      Precautions: None    Access Code: [email or web address]  URL: [email or web address]  Date: 2025  Prepared by: Emile Chu      Manuals 3/4 3/10 3/13          Cervical central nerve gliders SG Gr I B DL  DL          Thoracic PA  DL Gr II-III DL Gr II-III           Laser  R cervical                          Neuro Re-Ed                                                                                                        Ther Ex             Row  [place] 20R 3x10 [place] 23R 3x10          Shoulder ext  [place] 13R 3x10 [place] 13R 3x10          Shoulder rolls backwards HEP            Thoracic ext in seated  W/ pec stretch 5\"x10  W/ pec stretch 5\"x10           Pec major stretch HEP 3x30\" 3x30\"           Cervical isometric HEP 5\"x10 flex/ext/SB 5\"x10 flex/ext/SB          Scapular retraction HEP            Thoracic foam protocol             Prone periscapular lift T, ext 3x10 ea.  3x10 ea.           Bent over row  NV focus scap ret.  15# 2x10 ea.                        Chin tuck  Seated  10x5\" Seated 10x5\"          Ther Activity                                       Gait Training                                       Modalities                                              "

## 2025-03-17 ENCOUNTER — OFFICE VISIT (OUTPATIENT)
Dept: DERMATOLOGY | Facility: CLINIC | Age: 65
End: 2025-03-17
Payer: COMMERCIAL

## 2025-03-17 VITALS — WEIGHT: 241 LBS | BODY MASS INDEX: 29.34 KG/M2 | TEMPERATURE: 97.5 F

## 2025-03-17 DIAGNOSIS — L91.8 ACROCHORDON: ICD-10-CM

## 2025-03-17 DIAGNOSIS — Z85.820 HISTORY OF MELANOMA: Primary | ICD-10-CM

## 2025-03-17 DIAGNOSIS — D18.01 CHERRY ANGIOMA: ICD-10-CM

## 2025-03-17 DIAGNOSIS — D22.9 MULTIPLE MELANOCYTIC NEVI: ICD-10-CM

## 2025-03-17 DIAGNOSIS — L81.4 LENTIGINES: ICD-10-CM

## 2025-03-17 DIAGNOSIS — L82.1 SEBORRHEIC KERATOSES: ICD-10-CM

## 2025-03-17 DIAGNOSIS — L57.0 ACTINIC KERATOSES: ICD-10-CM

## 2025-03-17 PROCEDURE — 17000 DESTRUCT PREMALG LESION: CPT | Performed by: REGISTERED NURSE

## 2025-03-17 PROCEDURE — 17003 DESTRUCT PREMALG LES 2-14: CPT | Performed by: REGISTERED NURSE

## 2025-03-17 PROCEDURE — 99214 OFFICE O/P EST MOD 30 MIN: CPT | Performed by: REGISTERED NURSE

## 2025-03-17 NOTE — PROGRESS NOTES
"[facility] Note     Patient Name: Caleb Tejada  Encounter Date: 03/17/2025     Have you been cared for by a [facility] Dermatologist in the last 3 years and, if so, which description applies to you?    Yes.  I have been here within the last 3 years, and my medical history has NOT changed since that time.  I am MALE/not capable of bearing children.    REVIEW OF SYSTEMS:  Have you recently had or currently have any of the following? No changes in my recent health.   PAST MEDICAL HISTORY:  Have you personally ever had or currently have any of the following?  If \"YES,\" then please provide more detail. No changes in my medical history.   HISTORY OF IMMUNOSUPPRESSION: Do you have a history of any of the following:  Systemic Immunosuppression such as Diabetes, Biologic or Immunotherapy, Chemotherapy, Organ Transplantation, Bone Marrow Transplantation or Prednisone?  No     Answering \"YES\" requires the addition of the dotphrase \"IMMUNOSUPPRESSED\" as the first diagnosis of the patient's visit.   FAMILY HISTORY:  Any \"first degree relatives\" (parent, brother, sister, or child) with the following?    No changes in my family's known health.   PATIENT EXPERIENCE:    Do you want the Dermatologist to perform a COMPLETE skin exam today including a clinical examination under the \"bra and underwear\" areas?  Yes  If necessary, do we have your permission to call and leave a detailed message on your Preferred Phone number that includes your specific medical information?  Yes      Allergies   Allergen Reactions    Bee Venom Edema    Celecoxib Hives     Reaction Date: 21Nov2011; Category: Allergy;       Current Outpatient Medications:     amLODIPine (NORVASC) 5 mg tablet, TAKE 2 TABLETS BY MOUTH EVERY MORNING, Disp: 180 tablet, Rfl: 1    Ascorbic Acid (VITAMIN C) 1000 MG tablet, Take 1 tablet by mouth daily, Disp: , Rfl:     EPINEPHrine (EpiPen 2-Sherman) 0.3 mg/0.3 mL SOAJ, Inject 0.3 mL (0.3 mg total) into a muscle " "once for 1 dose, Disp: 1 each, Rfl: 1    escitalopram (LEXAPRO) 20 mg tablet, TAKE 1 TABLET BY MOUTH EVERY DAY, Disp: 90 tablet, Rfl: 1    famotidine (PEPCID) 10 mg tablet, Take 10 mg by mouth daily at bedtime as needed for heartburn (Patient not taking: Reported on 2/27/2025), Disp: , Rfl:     rosuvastatin (CRESTOR) 10 MG tablet, TAKE 1 TABLET BY MOUTH EVERY DAY, Disp: 90 tablet, Rfl: 1          Whom besides the patient is providing clinical information about today's encounter?   NO ADDITIONAL HISTORIAN (patient alone provided history)    Physical Exam and Assessment/Plan by Diagnosis:    HISTORY OF MELANOMA    Physical Exam:  Anatomic Location Affected:  right elbow  Morphological Description of Scar:  well healed  Year Treated: 2012  TNM Classification: unknown but no lymph node biopsy  Suspected Recurrence: no  Regional adenopathy: no    Additional History of Present Condition:  History of melanoma with no signs of recurrence. Excision performed in 09/2012 by Dr. Soto. Denies any fever, chills, or night sweats.    Assessment and Plan:  Based on a thorough discussion of this condition and the management approach to it (including a comprehensive discussion of the known risks, side effects and potential benefits of treatment), the patient (family) agrees to implement the following specific plan:  Monitor for change or recurrence  Follow up in 1 year    What happens at follow-up?  The main purpose of follow-up is to detect recurrences early (metastatic melanoma), but it also offers an opportunity to diagnose a new primary melanoma at the first possible opportunity. A second invasive melanoma occurs in 5-10% of melanoma patients and a new melanoma in situ is diagnosed in more than 20% of melanoma patients.    Our practice makes the following recommendations for follow-up for patients with invasive melanoma.  At-least \"monthly\" self-skin examinations   Routine skin checks by a board certified " "dermatologist  Follow-up intervals are \"every 3 months\" within 2 years of a new melanoma diagnosis; \"every 6 months\" between 2-4 years of a new melanoma diagnosis; and \"annually\" after 4 years of a new melanoma diagnosis  Individual patient's needs should be considered before an appropriate follow-up is offered  Provide education and support to help the patient adjust to their illness    Follow-up appointments should include:  A check of the scar where the primary melanoma was removed  Checking the regional lymph nodes  A general skin examination  A full physical examination at least annually by your primary care physician    In those with more advanced primary disease, follow-up may include:  Blood tests  Imaging: ultrasound, X-ray, CT, MRI and PET scan.    Most tests are not worthwhile for patients with stage 1 or 2 melanoma unless there are signs or symptoms of disease recurrence or metastasis. No tests are necessary for healthy patients who have remained well for five years or longer after removal of their melanoma.    What is the outlook for patients with melanoma?  Melanoma in situ is cured by excision because it has no potential to spread around the body.  The risk of spread and ultimate death from invasive melanoma depends on several factors, but the main one is the Breslow thickness of the melanoma at the time it was surgically removed.  Metastases are rare for melanomas < 0.75 mm and the risk for tumours 0.75-1 mm thick is about 5%. The risk steadily increases with thickness so that melanomas > 4 mm have a risk of metastasis of about 40%.    Melanoma is a potentially serious type of skin cancer, in which there is uncontrolled growth of melanocytes (pigment cells). Melanoma is sometimes called malignant melanoma.  Normal melanocytes are found in the basal layer of the epidermis (the outer layer of skin). Melanocytes produce a protein called melanin, which protects skin cells by absorbing ultraviolet (UV) " radiation. Melanocytes are found in equal numbers in black and white skin, but melanocytes in black skin produce much more melanin. People with dark brown or black skin are very much less likely to be damaged by UV radiation than those with white skin.    SEBORRHEIC KERATOSES  - Relevant exam: Scattered over the trunk/extremities are waxy brown to black plaques and papules with stuck on appearance  - Exam and clinical history consistent with seborrheic keratoses  - Counseled that these are benign growths that do not require treatment  - Counseled that removal of lesions is considered cosmetic and so would incur a fee should patient elect to move forward.    MELANOCYTIC NEVI  - Relevant exam: Scattered over the trunk/extremities are homogenously pigmented brown macules and papules. ELM performed and without concerning findings.  - Exam and clinical history consistent with melanocytic nevi  - Educated on the ABCDE's of melanoma  - Counseled to return to clinic prior to scheduled appointment should any of these lesions change or should any new lesions of concern arise  - Counseled on use of sun protection daily. Reviewed latest FDA sunscreen guidelines, including use of broad spectrum (UVA and UVB blocking) sunscreen or sun protective clothing with SPF 30-50 every 2-3 hours and reapplied after exposure to water; use of photoprotective clothing, including a broad brim hat and UPF rated clothing if outdoors for several hours; avoid use of tanning beds as these pose significant risk for melanoma and skin cancer.    LENTIGINES  OTHER SKIN CHANGES DUE TO CHRONIC EXPOSURE TO NONIONIZING RADIATION  - Relevant exam: Over sun exposed areas are brown macules. ELM performed and without concerning findings.  - Exam and clinical history consistent with lentigines.  - Educated that these are indicative of prior sun exposure.   - Counseled to return to clinic prior to scheduled appointment should any of these lesions change or should  "any new lesions of concern arise.  - Recommended use of sunscreen as above and below.  - Counseled on use of sun protection daily. Reviewed latest FDA sunscreen guidelines, including use of broad spectrum (UVA and UVB blocking) sunscreen or sun protective clothing with SPF 30-50 every 2-3 hours and reapplied after exposure to water; use of photoprotective clothing, including a broad brim hat and UPF rated clothing if outdoors for several hours; avoid use of tanning beds as these pose significant risk for melanoma and skin cancer.    CHERRY ANGIOMAS  - Relevant exam: Scattered over the trunk/extremities are red papules  - Exam and clinical history consistent with cherry angiomas  - Educated that these are benign    ACTINIC KERATOSES  - Relevant exam: On the right cheek are gritty pink papules  - Exam and clinical history consistent with actinic keratoses  - Discussed that these lesions are considered premalignant with the potential to evolve into squamous cell carcinoma.   - Discussed that these are due to chronic sun exposure and therefore recommend use of sunscreen/sun protection to prevent further sun damage  - Discussed treatment options, including liquid nitrogen destruction, topical immunotherapy, and photodynamic therapy, including risks, benefits    OPTION 3:    PROCEDURE:  DESTRUCTION OF PRE-MALIGNANT LESIONS    - After a thorough discussion of treatment options and risk/benefits/alternatives (including but not limited to local pain, scarring, dyspigmentation, blistering, and possible superinfection), verbal and written consent were obtained and the aforementioned lesions were treated on with cryotherapy using liquid nitrogen x 1 cycle for 5-10 seconds.    TOTAL NUMBER of 2 pre-malignant lesions were treated today on the ANATOMIC LOCATION: right cheek.     The patient tolerated the procedure well, and after-care instructions were provided.    ACROCHORDON (\"SKIN TAG\")    Physical Exam:  Anatomic Location " Affected:  neck  Morphological Description:  skin colored pedunculated papule   Pertinent Positives:  Pertinent Negatives:    Additional History of Present Condition:  Reported by patient.    Assessment and Plan:  Based on a thorough discussion of this condition and the management approach to it (including a comprehensive discussion of the known risks, side effects and potential benefits of treatment), the patient (family) agrees to implement the following specific plan:  Counseled that this is benign    Skin tags are common, soft, harmless skin lesions that are also called, in the appropriate settings, papillomas, fibroepithelial polyps, and soft fibromas.  They are made up of loosely arranged collagen fibers and blood vessels surrounded by a thickened or thinned-out epidermis.    Skin tags tend to develop in both men and women as we grow older.  They are usually found on the skin folds (neck, armpits, groin).  It is not known what specifically causes skin tags.  Certain factors, though, do appear to play a role:  Chaffing and irritation from skin rubbing together  High levels of growth factors (as seen, for example, in pregnancy or in acromegaly/gigantism)  Insulin resistance  Human papillomavirus (wart virus)    We discussed that most skin tags do not need to be treated unless they are specifically causing the patient physical distress or limitation or pose a risk for a larger problem such as an infection that forms secondary to excoriation or chronic irritation.    We had a thorough discussion of treatment options and specific risks (including that any procedural treatment may not be covered by insurance and would then be the patient's responsibility) and benefits/alternatives including but not limited to the following:  Cryotherapy (freezing)  Shave removal  Surgical excision (snip excision with scissors)  Electrosurgery  Ligation (we do not do this procedure and counseled against it due to risk of tissue  necrosis and infection)      Scribe Attestation      I,:  Kaela Whitt MA am acting as a scribe while in the presence of the attending physician.:       I,:  Stef De La Cruz MD personally performed the services described in this documentation    as scribed in my presence.:

## 2025-03-18 ENCOUNTER — OFFICE VISIT (OUTPATIENT)
Dept: PHYSICAL THERAPY | Facility: CLINIC | Age: 65
End: 2025-03-18
Payer: COMMERCIAL

## 2025-03-18 DIAGNOSIS — M54.12 RADICULOPATHY, CERVICAL REGION: Primary | ICD-10-CM

## 2025-03-18 PROCEDURE — 97110 THERAPEUTIC EXERCISES: CPT | Performed by: PHYSICAL THERAPIST

## 2025-03-18 PROCEDURE — 97140 MANUAL THERAPY 1/> REGIONS: CPT | Performed by: PHYSICAL THERAPIST

## 2025-03-18 NOTE — PROGRESS NOTES
"Daily Note     Today's date: 3/18/2025  Patient name: Caleb Tejada  : 1960  MRN: [id number]  Referring provider: Frederic Dee*  Dx:   Encounter Diagnosis     ICD-10-CM    1. Radiculopathy, cervical region  M54.12                      Subjective: Pt reports that he has not had much of his pain since last visit. He is feeling good today with no complaints.       Objective: See treatment diary below      Assessment: Pt responds very well to current POC with no complaints of pain. Continued exercises and manual treatments. Pain has resolved since last visit; with FOTO score exceeding expected results. As pt is doing very well, plan to discharge pt at next visit.       Plan: Discharge NV     Precautions: None    Access Code: [email or web address]  URL: [email or web address]  Date: 2025  Prepared by: Emile Chu      Manuals 3/4 3/10 3/13 3/18         Cervical central nerve gliders SG Gr I B DL  DL DL         Thoracic PA  DL Gr II-III DL Gr II-III  DL Gr II-III         Laser  R cervical                          Neuro Re-Ed                                                                                                        Ther Ex             Row  [place] 20R 3x10 [place] 23R 3x10 [place] 25R 3x10         Shoulder ext  [place] 13R 3x10 [place] 13R 3x10 [place] 13R 3x10         Shoulder rolls backwards HEP            Thoracic ext in seated  W/ pec stretch 5\"x10  W/ pec stretch 5\"x10  W/ pec stretch 5\"x10          Pec major stretch HEP 3x30\" 3x30\"  3x30\"          Cervical isometric HEP 5\"x10 flex/ext/SB 5\"x10 flex/ext/SB 5\"x10 flex/ext/SB         Scapular retraction HEP            Thoracic foam protocol             Prone periscapular lift T, ext 3x10 ea.  3x10 ea.  3x10 ea. Cerv neutral         Bent over row  NV focus scap ret.  15# 2x10 ea.  15# 2x10 ea.                       Chin tuck  Seated  10x5\" Seated 10x5\" Seated 10x5\"         Ther Activity                                       Gait Training  "                                      Modalities

## 2025-03-21 ENCOUNTER — OFFICE VISIT (OUTPATIENT)
Dept: PHYSICAL THERAPY | Facility: CLINIC | Age: 65
End: 2025-03-21
Payer: COMMERCIAL

## 2025-03-21 DIAGNOSIS — M54.12 RADICULOPATHY, CERVICAL REGION: Primary | ICD-10-CM

## 2025-03-21 PROCEDURE — 97140 MANUAL THERAPY 1/> REGIONS: CPT

## 2025-03-21 PROCEDURE — 97110 THERAPEUTIC EXERCISES: CPT

## 2025-03-21 NOTE — PROGRESS NOTES
"Daily Note     Today's date: 3/21/2025  Patient name: Caleb Tejada  : 1960  MRN: [id number]  Referring provider: Frederic Dee*  Dx:   Encounter Diagnosis     ICD-10-CM    1. Radiculopathy, cervical region  M54.12                      Subjective: Pt reports his R shoulder feels good. Notes he has a HA, which began last night in R occipital area up to R side/top of his head. He feels it is similar to a tension HA, and expressed his frustration that the HA has returned.      Objective: See treatment diary below      Assessment: Pt performed exercise program w/o increasing symptoms. Responded well to manual therapies.Tolerated treatment well. Patient would benefit from continued PT to decrease pain, and HA symptoms.      Plan: Continue per plan of care.      Precautions: None    Access Code: [email or web address]  URL: [email or web address]  Date: 2025  Prepared by: Emile Chu      Manuals 3/4 3/10 3/13 3/18 3/21        Cervical central nerve gliders SG Gr I B DL  DL DL DL        Thoracic PA  DL Gr II-III DL Gr II-III  DL Gr II-III DL Gr II-III        Laser  R cervical                          Neuro Re-Ed                                                                                                        Ther Ex             Row  [place] 20R 3x10 [place] 23R 3x10 [place] 25R 3x10 [place] 25R 3x10        Shoulder ext  [place] 13R 3x10 [place] 13R 3x10 [place] 13R 3x10 [place] 13R 3x10        Shoulder rolls backwards HEP            Thoracic ext in seated  W/ pec stretch 5\"x10  W/ pec stretch 5\"x10  W/ pec stretch 5\"x10  W/pec stretch 5\"x10        Pec major stretch HEP 3x30\" 3x30\"  3x30\"  3x30\"        Cervical isometric HEP 5\"x10 flex/ext/SB 5\"x10 flex/ext/SB 5\"x10 flex/ext/SB 5\"x10 flex/ext/SB        Scapular retraction HEP            Thoracic foam protocol             Prone periscapular lift T, ext 3x10 ea.  3x10 ea.  3x10 ea. Cerv neutral 3x10 ea cerv neutral        Bent over row  NV focus scap " "ret.  15# 2x10 ea.  15# 2x10 ea.  15# 2x10 ea                     Tom bose  Seated  10x5\" Seated 10x5\" Seated 10x5\" Seated 10x5\"        Ther Activity                                       Gait Training                                       Modalities                                                  "

## 2025-03-25 ENCOUNTER — OFFICE VISIT (OUTPATIENT)
Dept: PHYSICAL THERAPY | Facility: CLINIC | Age: 65
End: 2025-03-25
Payer: COMMERCIAL

## 2025-03-25 DIAGNOSIS — M54.12 RADICULOPATHY, CERVICAL REGION: Primary | ICD-10-CM

## 2025-03-25 PROCEDURE — 97110 THERAPEUTIC EXERCISES: CPT

## 2025-03-25 NOTE — PROGRESS NOTES
"Daily Note     Today's date: 3/25/2025  Patient name: Caleb Tejada  : 1960  MRN: [id number]  Referring provider: Frederic Dee*  Dx:   Encounter Diagnosis     ICD-10-CM    1. Radiculopathy, cervical region  M54.12                      Subjective: Pt reports he remained painful after LV. Notes he is feeling good today, no pain.      Objective: See treatment diary below      Assessment: Performed exercise program w/Io difficulty or discomfort. Demonstrates good knowledge of same. Tolerated treatment well. Will monitor.      Plan: Continue per plan of care. D/C next visit.     Precautions: None    Access Code: [email or web address]  URL: [email or web address]  Date: 2025  Prepared by: Emile Chu      Manuals 3/4 3/10 3/13 3/18 3/21 3/25       Cervical central nerve gliders SG Gr I B DL  DL DL DL DL       Thoracic PA  DL Gr II-III DL Gr II-III  DL Gr II-III DL Gr II-III FH Gr II-III       Laser  R cervical                          Neuro Re-Ed                                                                                                        Ther Ex             Row  [place] 20R 3x10 [place] 23R 3x10 [place] 25R 3x10 [place] 25R 3x10 [place] 25R 3x10       Shoulder ext  [place] 13R 3x10 [place] 13R 3x10 [place] 13R 3x10 [place] 13R 3x10 [place] 13R 3x10       Shoulder rolls backwards HEP            Thoracic ext in seated  W/ pec stretch 5\"x10  W/ pec stretch 5\"x10  W/ pec stretch 5\"x10  W/pec stretch 5\"x10 W/pec stretch 5\"x10       Pec major stretch HEP 3x30\" 3x30\"  3x30\"  3x30\" 3x30\"       Cervical isometric HEP 5\"x10 flex/ext/SB 5\"x10 flex/ext/SB 5\"x10 flex/ext/SB 5\"x10 flex/ext/SB 5\"x10 flex/ext/SB       Scapular retraction HEP            Thoracic foam protocol             Prone periscapular lift T, ext 3x10 ea.  3x10 ea.  3x10 ea. Cerv neutral 3x10 ea cerv neutral 3x10 ea cerv neutral       Bent over row  NV focus scap ret.  15# 2x10 ea.  15# 2x10 ea.  15# 2x10 ea 15# 2x10 ea                  " "  Chin tuck  Seated  10x5\" Seated 10x5\" Seated 10x5\" Seated 10x5\" Seated 10\"x5       Ther Activity                                       Gait Training                                       Modalities                                                    "

## 2025-03-27 ENCOUNTER — OFFICE VISIT (OUTPATIENT)
Dept: PHYSICAL THERAPY | Facility: CLINIC | Age: 65
End: 2025-03-27
Payer: COMMERCIAL

## 2025-03-27 DIAGNOSIS — M54.16 LUMBAR RADICULOPATHY: ICD-10-CM

## 2025-03-27 DIAGNOSIS — M54.12 RADICULOPATHY, CERVICAL REGION: Primary | ICD-10-CM

## 2025-03-27 PROCEDURE — 97110 THERAPEUTIC EXERCISES: CPT | Performed by: PHYSICAL THERAPIST

## 2025-03-27 NOTE — PROGRESS NOTES
"Discharge    Today's date: 3/27/2025  Patient name: Caleb Tejada  : 1960  MRN: [id number]  Referring provider: Frederic Dee*  Dx:   Encounter Diagnosis     ICD-10-CM    1. Radiculopathy, cervical region  M54.12       2. Lumbar radiculopathy  M54.16                      Subjective: Pt reports that he has been pain free. He is satisfied with outcomes from rehab and feels that he may be discharged today.       Objective: See treatment diary below      Assessment: Pt has remained completely pain free with resolution of headache symptoms and periscapular pain. Pt understands his home program well. Pt is appropriate for discharge from PT.       Plan: Discharge     Precautions: None    Access Code: [email or web address]  URL: [email or web address]  Date: 2025  Prepared by: Emile Chu      Manuals 3/4 3/10 3/13 3/18 3/21 3/25 3/27      Cervical central nerve gliders SG Gr I B DL  DL DL DL DL DL      Thoracic PA  DL Gr II-III DL Gr II-III  DL Gr II-III DL Gr II-III FH Gr II-III DL Gr II-III      Laser  R cervical                          Neuro Re-Ed                                                                                                        Ther Ex             Row  [place] 20R 3x10 [place] 23R 3x10 [place] 25R 3x10 [place] 25R 3x10 [place] 25R 3x10 [place] 25R 3x10      Shoulder ext  [place] 13R 3x10 [place] 13R 3x10 [place] 13R 3x10 [place] 13R 3x10 [place] 13R 3x10 [place] 13R 3x10      Shoulder rolls backwards HEP            Thoracic ext in seated  W/ pec stretch 5\"x10  W/ pec stretch 5\"x10  W/ pec stretch 5\"x10  W/pec stretch 5\"x10 W/pec stretch 5\"x10 W/pec stretch 5\"x10      Pec major stretch HEP 3x30\" 3x30\"  3x30\"  3x30\" 3x30\" 3x30\"      Cervical isometric HEP 5\"x10 flex/ext/SB 5\"x10 flex/ext/SB 5\"x10 flex/ext/SB 5\"x10 flex/ext/SB 5\"x10 flex/ext/SB 5\"x10 flex/ext/SB      Scapular retraction HEP            Thoracic foam protocol             Prone periscapular lift T, ext 3x10 ea.  3x10 ea.  " "3x10 ea. Cerv neutral 3x10 ea cerv neutral 3x10 ea cerv neutral 3x10 ea cerv neutral      Bent over row  NV focus scap ret.  15# 2x10 ea.  15# 2x10 ea.  15# 2x10 ea 15# 2x10 ea 15# 2x10 ea                   Chin tuck  Seated  10x5\" Seated 10x5\" Seated 10x5\" Seated 10x5\" Seated 10\"x5 Seated 10\"x5      Ther Activity                                       Gait Training                                       Modalities                                                      "

## 2025-04-12 DIAGNOSIS — E78.5 HYPERLIPIDEMIA, UNSPECIFIED HYPERLIPIDEMIA TYPE: ICD-10-CM

## 2025-04-13 RX ORDER — ROSUVASTATIN CALCIUM 10 MG/1
10 TABLET, COATED ORAL DAILY
Qty: 90 TABLET | Refills: 1 | Status: SHIPPED | OUTPATIENT
Start: 2025-04-13

## 2025-05-14 ENCOUNTER — APPOINTMENT (OUTPATIENT)
Dept: LAB | Facility: CLINIC | Age: 65
End: 2025-05-14
Payer: COMMERCIAL

## 2025-05-14 DIAGNOSIS — R73.03 PREDIABETES: ICD-10-CM

## 2025-05-14 DIAGNOSIS — E78.5 HYPERLIPIDEMIA, UNSPECIFIED HYPERLIPIDEMIA TYPE: ICD-10-CM

## 2025-05-14 LAB
ALBUMIN SERPL BCG-MCNC: 4.5 G/DL (ref 3.5–5)
ALP SERPL-CCNC: 63 U/L (ref 34–104)
ALT SERPL W P-5'-P-CCNC: 22 U/L (ref 7–52)
ANION GAP SERPL CALCULATED.3IONS-SCNC: 9 MMOL/L (ref 4–13)
AST SERPL W P-5'-P-CCNC: 23 U/L (ref 13–39)
BILIRUB SERPL-MCNC: 1.67 MG/DL (ref 0.2–1)
BUN SERPL-MCNC: 17 MG/DL (ref 5–25)
CALCIUM SERPL-MCNC: 9.5 MG/DL (ref 8.4–10.2)
CHLORIDE SERPL-SCNC: 103 MMOL/L (ref 96–108)
CHOLEST SERPL-MCNC: 160 MG/DL (ref ?–200)
CO2 SERPL-SCNC: 27 MMOL/L (ref 21–32)
CREAT SERPL-MCNC: 0.86 MG/DL (ref 0.6–1.3)
EST. AVERAGE GLUCOSE BLD GHB EST-MCNC: 120 MG/DL
GFR SERPL CREATININE-BSD FRML MDRD: 90 ML/MIN/1.73SQ M
GLUCOSE P FAST SERPL-MCNC: 105 MG/DL (ref 65–99)
HBA1C MFR BLD: 5.8 %
HDLC SERPL-MCNC: 50 MG/DL
LDLC SERPL CALC-MCNC: 91 MG/DL (ref 0–100)
POTASSIUM SERPL-SCNC: 4.4 MMOL/L (ref 3.5–5.3)
PROT SERPL-MCNC: 7.1 G/DL (ref 6.4–8.4)
SODIUM SERPL-SCNC: 139 MMOL/L (ref 135–147)
TRIGL SERPL-MCNC: 94 MG/DL (ref ?–150)

## 2025-05-14 PROCEDURE — 80053 COMPREHEN METABOLIC PANEL: CPT

## 2025-05-14 PROCEDURE — 83036 HEMOGLOBIN GLYCOSYLATED A1C: CPT

## 2025-05-14 PROCEDURE — 80061 LIPID PANEL: CPT

## 2025-05-14 PROCEDURE — 36415 COLL VENOUS BLD VENIPUNCTURE: CPT

## 2025-05-19 ENCOUNTER — OFFICE VISIT (OUTPATIENT)
Dept: INTERNAL MEDICINE CLINIC | Facility: CLINIC | Age: 65
End: 2025-05-19
Payer: COMMERCIAL

## 2025-05-19 VITALS
HEIGHT: 76 IN | WEIGHT: 239.2 LBS | SYSTOLIC BLOOD PRESSURE: 134 MMHG | DIASTOLIC BLOOD PRESSURE: 84 MMHG | HEART RATE: 72 BPM | OXYGEN SATURATION: 98 % | BODY MASS INDEX: 29.13 KG/M2

## 2025-05-19 DIAGNOSIS — I10 ESSENTIAL (PRIMARY) HYPERTENSION: ICD-10-CM

## 2025-05-19 DIAGNOSIS — R73.03 PREDIABETES: ICD-10-CM

## 2025-05-19 DIAGNOSIS — F41.9 ANXIETY: ICD-10-CM

## 2025-05-19 DIAGNOSIS — E78.5 HYPERLIPIDEMIA, UNSPECIFIED HYPERLIPIDEMIA TYPE: ICD-10-CM

## 2025-05-19 DIAGNOSIS — K63.5 POLYP OF COLON, UNSPECIFIED PART OF COLON, UNSPECIFIED TYPE: Primary | ICD-10-CM

## 2025-05-19 DIAGNOSIS — S90.112A CONTUSION OF LEFT GREAT TOE WITHOUT DAMAGE TO NAIL, INITIAL ENCOUNTER: ICD-10-CM

## 2025-05-19 PROCEDURE — G2211 COMPLEX E/M VISIT ADD ON: HCPCS | Performed by: INTERNAL MEDICINE

## 2025-05-19 PROCEDURE — 99214 OFFICE O/P EST MOD 30 MIN: CPT | Performed by: INTERNAL MEDICINE

## 2025-05-19 NOTE — ASSESSMENT & PLAN NOTE
Pre diabetes -I have counseled the patient to follow a healthy balanced diet, I have counseled patient reduce carbohydrates and sweets in the diet, I would like the patient exercise routinely.  I will be checking hemoglobin A1c and comprehensive metabolic panel.  Have counseled patient about the prevention of diabetes, and the risk of progression to type 2 diabetes.  Continue to optimize a diet  Orders:  •  Comprehensive metabolic panel; Future  •  Hemoglobin A1C; Future

## 2025-05-19 NOTE — PROGRESS NOTES
Name: Caleb Tejada      : 1960      MRN: [id number]  Encounter Provider: Tobin Qiu DO  Encounter Date: 2025   Enc[facility] department: [facility]  :  Assessment & Plan  Polyp of colon, unspecified part of colon, unspecified type  Previous history of colon polyps he is on a 5-year screening cycle will order GI consult for colonoscopy follow-up  Orders:  •  Ambulatory Referral to Gastroenterology; Future    Prediabetes  Pre diabetes -I have counseled the patient to follow a healthy balanced diet, I have counseled patient reduce carbohydrates and sweets in the diet, I would like the patient exercise routinely.  I will be checking hemoglobin A1c and comprehensive metabolic panel.  Have counseled patient about the prevention of diabetes, and the risk of progression to type 2 diabetes.  Continue to optimize a diet  Orders:  •  Comprehensive metabolic panel; Future  •  Hemoglobin A1C; Future    Hyperlipidemia, unspecified hyperlipidemia type  Hyperlipidemia controlled continue with current medical regiment recommend a low-cholesterol diet and recommend routine exercise we will continue to monitor the progress.  Continue Crestor 10 mg once daily  Orders:  •  Lipid Panel with Direct LDL reflex; Future    Essential (primary) hypertension  Hypertension - controlled, I have counseled patient following healthy balance diet, I would like the patient reduce sodium, exercise routinely, I would like the patient continued the med current medical regiment and we will continue to monitor.  Continue amlodipine 5 mg 2 tablets daily       Anxiety  Clinically stable and doing well continue the current medical regiment will continue monitor.       Contusion of left great toe without damage to nail, initial encounter  Mild left great toe contusion full range of motion intact patient with hold off on x-rays we will monitor closely if symptoms not improved in the next 2 weeks he will notify me  "continue ice and to rest the area Tylenol as needed as needed       RTO in 6 months call if any problems       History of Present Illness   HPI 64-year old male coming in for a follow up visit regarding colon polyp, prediabetes, hyperlipidemia, essential hypertension and anxiety; the patient reports me compliant taking medications without untoward side effects the.  The patient is here to review his medical condition, update me on the medical condition and the patient reports me no hospitalizations and no ER visits.  No injuries no illnesses overall stable and doing very well he does report to me recently dropped something on the left toe contusion dropped cat cut out dosent want xr reports me he does not believe this fracture would like to hold off on x-rays he is able to move there is mild swelling.  He will monitor ice.  If symptoms not improved next 2 weeks he will notify me.  Try to follow healthy and balanced diet exercising outside walking.  Overall stable and doing well reviewed laboratories in detail  Review of Systems   Constitutional:  Negative for activity change, appetite change and unexpected weight change.   HENT:  Negative for congestion and postnasal drip.    Eyes:  Negative for visual disturbance.   Respiratory:  Negative for cough and shortness of breath.    Cardiovascular:  Negative for chest pain.   Gastrointestinal:  Negative for abdominal pain, diarrhea, nausea and vomiting.   Neurological:  Negative for dizziness, light-headedness and headaches.   Hematological:  Negative for adenopathy.   Left great  toe contusion full range of motion  No subungual hematoma    Objective   /84 (BP Location: Left arm, Patient Position: Sitting, Cuff Size: Large)   Pulse 72   Ht 6' 4\" (1.93 m)   Wt 109 kg (239 lb 3.2 oz)   SpO2 98%   BMI 29.12 kg/m²      Physical Exam  Vitals and nursing note reviewed.   Constitutional:       General: He is not in acute distress.     Appearance: Normal appearance. He " is well-developed. He is not ill-appearing, toxic-appearing or diaphoretic.   HENT:      Head: Normocephalic and atraumatic.      Right Ear: External ear normal.      Left Ear: External ear normal.      Nose: Nose normal.     Eyes:      Pupils: Pupils are equal, round, and reactive to light.       Cardiovascular:      Rate and Rhythm: Normal rate and regular rhythm.      Heart sounds: Normal heart sounds. No murmur heard.  Pulmonary:      Effort: Pulmonary effort is normal.      Breath sounds: Normal breath sounds.   Abdominal:      General: There is no distension.      Palpations: Abdomen is soft.      Tenderness: There is no abdominal tenderness. There is no guarding.     Psychiatric:         Mood and Affect: Mood is not anxious or depressed.     Minimal swelling of the left great toe  Negative edema, distal pulse 2 or 2

## 2025-05-19 NOTE — ASSESSMENT & PLAN NOTE
Hyperlipidemia controlled continue with current medical regiment recommend a low-cholesterol diet and recommend routine exercise we will continue to monitor the progress.  Continue Crestor 10 mg once daily  Orders:  •  Lipid Panel with Direct LDL reflex; Future

## 2025-05-20 ENCOUNTER — PREP FOR PROCEDURE (OUTPATIENT)
Age: 65
End: 2025-05-20

## 2025-05-20 ENCOUNTER — TELEPHONE (OUTPATIENT)
Age: 65
End: 2025-05-20

## 2025-05-20 DIAGNOSIS — Z12.11 SCREENING FOR COLON CANCER: Primary | ICD-10-CM

## 2025-05-20 NOTE — TELEPHONE ENCOUNTER
Scheduled date of colonoscopy (as of today): 8/26/25  Physician performing colonoscopy: Bárbara  [facility]  Bowel prep reviewed with patient: Rani  Instructions via mail  Clearances: N/A    05/20/25  Screened by: Dorys Araujo MA    Referring Provider     Pre- Screening:     Ht,Wt, and BMI confirmed     Has patient been referred for a routine screening Colonoscopy? yes  Is the patient between 45-75 years old? yes      Previous Colonoscopy yes   If yes:    Date: 2020    Facility: [facility]    Reason:       Does the patient want to see a Gastroenterologist prior to their procedure OR are they having any GI symptoms? no    Has the patient been hospitalized or had abdominal surgery in the past 6 months? no    Does the patient use supplemental oxygen? no    Does the patient take Coumadin, Lovenox, Plavix, Elliquis, Xarelto, or other blood thinning medication? no    Has the patient had a stroke, cardiac event, or stent placed in the past year? no      If patient is between 45yrs - 49yrs, please advise patient that we will have to confirm benefits & coverage with their insurance company for a routine screening colonoscopy.

## 2025-05-23 DIAGNOSIS — I10 ESSENTIAL HYPERTENSION: ICD-10-CM

## 2025-05-23 RX ORDER — AMLODIPINE BESYLATE 5 MG/1
10 TABLET ORAL EVERY MORNING
Qty: 180 TABLET | Refills: 1 | Status: SHIPPED | OUTPATIENT
Start: 2025-05-23

## 2025-06-24 ENCOUNTER — OFFICE VISIT (OUTPATIENT)
Age: 65
End: 2025-06-24
Payer: COMMERCIAL

## 2025-06-24 ENCOUNTER — APPOINTMENT (OUTPATIENT)
Age: 65
End: 2025-06-24
Attending: ORTHOPAEDIC SURGERY
Payer: COMMERCIAL

## 2025-06-24 VITALS — HEIGHT: 76 IN | BODY MASS INDEX: 29.1 KG/M2 | WEIGHT: 239 LBS

## 2025-06-24 DIAGNOSIS — M79.662 PAIN OF LEFT CALF: Primary | ICD-10-CM

## 2025-06-24 DIAGNOSIS — M79.662 PAIN OF LEFT CALF: ICD-10-CM

## 2025-06-24 PROCEDURE — 73590 X-RAY EXAM OF LOWER LEG: CPT

## 2025-06-24 PROCEDURE — 99203 OFFICE O/P NEW LOW 30 MIN: CPT | Performed by: ORTHOPAEDIC SURGERY

## 2025-06-24 NOTE — PROGRESS NOTES
:  Assessment & Plan  Pain of left calf      LEFT calf pain  Ultrasound LEFT calf   Follow up after ultrasound   Consider further imaging if needed       REASON FOR VISIT:  Chief Complaint   Patient presents with    Left Lower Leg - Pain         HISTORY OF PRESENT ILLNESS:  LEFT calf pain    Pain for several months   No injury, no inciting events     Pain located in the LEFT calf      Pain deep in the central calf    Pain worse with standing    No worse with activity     Persistent ache       Past Medical History[1]     Past Surgical History[2]    Social History     Socioeconomic History    Marital status: /Civil Union     Spouse name: Not on file    Number of children: Not on file    Years of education: Not on file    Highest education level: Not on file   Occupational History    Not on file   Tobacco Use    Smoking status: Never    Smokeless tobacco: Never   Vaping Use    Vaping status: Never Used   Substance and Sexual Activity    Alcohol use: Yes     Alcohol/week: 4.0 standard drinks of alcohol     Types: 2 Glasses of wine, 2 Cans of beer per week     Comment: Social     Drug use: No    Sexual activity: Yes     Partners: Female   Other Topics Concern    Not on file   Social History Narrative    Not on file     Social Drivers of Health     Financial Resource Strain: Low Risk  (11/7/2023)    Overall Financial Resource Strain (CARDIA)     Difficulty of Paying Living Expenses: Not hard at all   Food Insecurity: No Food Insecurity (11/13/2024)    Nursing - Inadequate Food Risk Classification     Worried About Running Out of Food in the Last Year: Never true     Ran Out of Food in the Last Year: Never true     Ran Out of Food in the Last Year: Not on file   Transportation Needs: No Transportation Needs (11/13/2024)    PRAPARE - Transportation     Lack of Transportation (Medical): No     Lack of Transportation (Non-Medical): No   Physical Activity: Not on file   Stress: Not on file   Social Connections: Not on  file   Intimate Partner Violence: Not on file   Housing Stability: Low Risk  (11/13/2024)    Housing Stability Vital Sign     Unable to Pay for Housing in the Last Year: No     Number of Times Moved in the Last Year: 1     Homeless in the Last Year: No       MEDICATIONS:  Current Medications[3]    ALLERGIES:  Allergies[4]      MAJOR FINDINGS:  Caleb Tejada is pleasant, healthy appearing, and in no acute distress.     LEFT knee  Skin intact, ROM 0-0-130   No effusion  Normal patella tracking   No patella apprehension  Joint line tenderness: none, Toro test: negative  Anterior drawer: negative, Lachman: stable, Posterior drawer: negative   Stable to varus/valgus  Sensation intact sp/dp/t  Strength intact 5/5 dorsiflexion/plantarflexion/SLR   Extremity wwp  + calf pain, minimal pain with dorsiflexion      RIGHT knee  Skin intact, ROM 0-0-130   No effusion  Normal patella tracking   No patella apprehension  Joint line tenderness: none, Toro test: negative  Anterior drawer: negative, Lachman: stable, Posterior drawer: negative   Stable to varus/valgus  Sensation intact sp/dp/t  Strength intact 5/5 dorsiflexion/plantarflexion/SLR   Extremity wwp  No calf pain    IMAGING  I personally reviewed and interpreted the imaging studies  X-rays performed on the LEFT tib/fib show no acute osseous abnormality       CC:  Tobin Qiu, DO Self, Referral         [1]   Past Medical History:  Diagnosis Date    Cancer (HCC)     COVID-19     Depression 11/2004    Hyperlipidemia     Hypertension don't remember    Malignant melanoma of skin (HCC)     last assessed 10/25/17, resolved 4/20/15   [2]   Past Surgical History:  Procedure Laterality Date    COLONOSCOPY  2012    Dr. Castorena   Impression 1/12/15    HEMORROIDECTOMY      SKIN LESION EXCISION Right     Elbow     TONSILECTOMY AND ADNOIDECTOMY     [3]   Current Outpatient Medications:     amLODIPine (NORVASC) 5 mg tablet, TAKE 2 TABLETS BY MOUTH EVERY MORNING, Disp: 180  tablet, Rfl: 1    Ascorbic Acid (VITAMIN C) 1000 MG tablet, Take 1 tablet by mouth in the morning., Disp: , Rfl:     EPINEPHrine (EpiPen 2-Sherman) 0.3 mg/0.3 mL SOAJ, Inject 0.3 mL (0.3 mg total) into a muscle once for 1 dose, Disp: 1 each, Rfl: 1    escitalopram (LEXAPRO) 20 mg tablet, TAKE 1 TABLET BY MOUTH EVERY DAY, Disp: 90 tablet, Rfl: 1    famotidine (PEPCID) 10 mg tablet, Take 10 mg by mouth daily at bedtime as needed for heartburn, Disp: , Rfl:     rosuvastatin (CRESTOR) 10 MG tablet, TAKE 1 TABLET BY MOUTH EVERY DAY, Disp: 90 tablet, Rfl: 1  [4]   Allergies  Allergen Reactions    Bee Venom Edema    Celecoxib Hives     Celebrex (Brand name) Reaction Date: 21Nov2011; Category: Allergy;

## 2025-06-25 ENCOUNTER — HOSPITAL ENCOUNTER (OUTPATIENT)
Dept: NON INVASIVE DIAGNOSTICS | Facility: HOSPITAL | Age: 65
Discharge: HOME | End: 2025-06-25
Attending: ORTHOPAEDIC SURGERY
Payer: COMMERCIAL

## 2025-06-25 DIAGNOSIS — M79.662 PAIN OF LEFT CALF: ICD-10-CM

## 2025-06-25 PROCEDURE — 93971 EXTREMITY STUDY: CPT

## 2025-06-25 PROCEDURE — 93971 EXTREMITY STUDY: CPT | Performed by: SURGERY

## 2025-06-30 ENCOUNTER — OFFICE VISIT (OUTPATIENT)
Age: 65
End: 2025-06-30
Payer: COMMERCIAL

## 2025-06-30 DIAGNOSIS — M79.662 PAIN OF LEFT CALF: Primary | ICD-10-CM

## 2025-06-30 PROCEDURE — 99213 OFFICE O/P EST LOW 20 MIN: CPT | Performed by: ORTHOPAEDIC SURGERY

## 2025-06-30 RX ORDER — DICLOFENAC SODIUM 75 MG/1
75 TABLET, DELAYED RELEASE ORAL 2 TIMES DAILY
Qty: 30 TABLET | Refills: 0 | Status: SHIPPED | OUTPATIENT
Start: 2025-06-30

## 2025-06-30 NOTE — PROGRESS NOTES
:  Assessment & Plan  Pain of left calf    Orders:    Ambulatory Referral to Physical Therapy; Future    Diclofenac Sodium (VOLTAREN) 1 %; Apply 2 g topically 4 (four) times a day    diclofenac (VOLTAREN) 75 mg EC tablet; Take 1 tablet (75 mg total) by mouth 2 (two) times a day      LEFT calf pain  Ultrasound LEFT calf reviewed, no acute findings    PT script provided  Voltaren gel prescribed  Also prescribed voltaren PO (can use PO or topical based on preference, but do not use together or with other NSAIDs)  Follow up 1 month, will obtain MRI if no relief        REASON FOR VISIT:  Chief Complaint   Patient presents with    Left Lower Leg - Follow-up       Interval Hx  Continues to have pain   Increases in severity, pain can get up to a 7/10   Feels better once he gets up and moves  Denies numbness or tingling   Taking Tylenol/ibuprofen as needed   Hx of pulled calf about 3 years       HISTORY OF PRESENT ILLNESS:  LEFT calf pain    Pain for several months   No injury, no inciting events     Pain located in the LEFT calf      Pain deep in the central calf  Pain worse with standing  No worse with activity     Persistent ache       Past Medical History[1]     Past Surgical History[2]    Social History     Socioeconomic History    Marital status: /Civil Union     Spouse name: Not on file    Number of children: Not on file    Years of education: Not on file    Highest education level: Not on file   Occupational History    Not on file   Tobacco Use    Smoking status: Never    Smokeless tobacco: Never   Vaping Use    Vaping status: Never Used   Substance and Sexual Activity    Alcohol use: Yes     Alcohol/week: 4.0 standard drinks of alcohol     Types: 2 Glasses of wine, 2 Cans of beer per week     Comment: Social     Drug use: No    Sexual activity: Yes     Partners: Female   Other Topics Concern    Not on file   Social History Narrative    Not on file     Social Drivers of Health     Financial Resource Strain:  Low Risk  (11/7/2023)    Overall Financial Resource Strain (CARDIA)     Difficulty of Paying Living Expenses: Not hard at all   Food Insecurity: No Food Insecurity (11/13/2024)    Nursing - Inadequate Food Risk Classification     Worried About Running Out of Food in the Last Year: Never true     Ran Out of Food in the Last Year: Never true     Ran Out of Food in the Last Year: Not on file   Transportation Needs: No Transportation Needs (11/13/2024)    PRAPARE - Transportation     Lack of Transportation (Medical): No     Lack of Transportation (Non-Medical): No   Physical Activity: Not on file   Stress: Not on file   Social Connections: Not on file   Intimate Partner Violence: Not on file   Housing Stability: Low Risk  (11/13/2024)    Housing Stability Vital Sign     Unable to Pay for Housing in the Last Year: No     Number of Times Moved in the Last Year: 1     Homeless in the Last Year: No       MEDICATIONS:  Current Medications[3]    ALLERGIES:  Allergies[4]      MAJOR FINDINGS:  Caleb Tejada is pleasant, healthy appearing, and in no acute distress.     LEFT knee  Skin intact, ROM 0-0-130   No effusion  Normal patella tracking   No patella apprehension  Joint line tenderness: none, Toro test: negative  Anterior drawer: negative, Lachman: stable, Posterior drawer: negative   Stable to varus/valgus  Sensation intact sp/dp/t  Strength intact 5/5 dorsiflexion/plantarflexion/SLR   Extremity wwp  + calf pain, minimal pain with dorsiflexion        IMAGING  I personally reviewed and interpreted the imaging studies  X-rays performed on the LEFT tib/fib show no acute osseous abnormality       Ultrasound 6/25/25  LEFT LOWER LIMB:   There is no evidence of acute or chronic deep vein thrombosis.   There is no evidence of superficial thrombophlebitis.   Doppler evaluation shows a normal response to augmentation maneuvers.   Popliteal, posterior tibial and anterior tibial arterial Doppler waveforms are triphasic.      RIGHT LOWER LIMB LIMITED:   There is no evidence of thrombus in the common femoral vein.    Doppler evaluation shows a normal response to augmentation maneuvers.          CC:  Tobin Qiu,  No ref. provider found         [1]   Past Medical History:  Diagnosis Date    Cancer (HCC)     COVID-19     Depression 11/2004    Hyperlipidemia     Hypertension don't remember    Malignant melanoma of skin (HCC)     last assessed 10/25/17, resolved 4/20/15   [2]   Past Surgical History:  Procedure Laterality Date    COLONOSCOPY  2012    Dr. Castorena   Impression 1/12/15    HEMORROIDECTOMY      SKIN LESION EXCISION Right     Elbow     TONSILECTOMY AND ADNOIDECTOMY     [3]   Current Outpatient Medications:     diclofenac (VOLTAREN) 75 mg EC tablet, Take 1 tablet (75 mg total) by mouth 2 (two) times a day, Disp: 30 tablet, Rfl: 0    Diclofenac Sodium (VOLTAREN) 1 %, Apply 2 g topically 4 (four) times a day, Disp: 240 g, Rfl: 0    amLODIPine (NORVASC) 5 mg tablet, TAKE 2 TABLETS BY MOUTH EVERY MORNING, Disp: 180 tablet, Rfl: 1    Ascorbic Acid (VITAMIN C) 1000 MG tablet, Take 1 tablet by mouth in the morning., Disp: , Rfl:     EPINEPHrine (EpiPen 2-Sherman) 0.3 mg/0.3 mL SOAJ, Inject 0.3 mL (0.3 mg total) into a muscle once for 1 dose, Disp: 1 each, Rfl: 1    escitalopram (LEXAPRO) 20 mg tablet, TAKE 1 TABLET BY MOUTH EVERY DAY, Disp: 90 tablet, Rfl: 1    famotidine (PEPCID) 10 mg tablet, Take 10 mg by mouth daily at bedtime as needed for heartburn, Disp: , Rfl:     rosuvastatin (CRESTOR) 10 MG tablet, TAKE 1 TABLET BY MOUTH EVERY DAY, Disp: 90 tablet, Rfl: 1  [4]   Allergies  Allergen Reactions    Bee Venom Edema    Celecoxib Hives     Celebrex (Brand name) Reaction Date: 21Nov2011; Category: Allergy;

## 2025-07-08 ENCOUNTER — EVALUATION (OUTPATIENT)
Dept: PHYSICAL THERAPY | Facility: CLINIC | Age: 65
End: 2025-07-08
Payer: COMMERCIAL

## 2025-07-08 DIAGNOSIS — M54.16 LUMBAR RADICULOPATHY: Primary | ICD-10-CM

## 2025-07-08 DIAGNOSIS — M79.662 PAIN OF LEFT CALF: ICD-10-CM

## 2025-07-08 PROCEDURE — 97110 THERAPEUTIC EXERCISES: CPT | Performed by: PHYSICAL THERAPIST

## 2025-07-08 PROCEDURE — 97161 PT EVAL LOW COMPLEX 20 MIN: CPT | Performed by: PHYSICAL THERAPIST

## 2025-07-08 NOTE — PROGRESS NOTES
PT Evaluation     Today's date: 2025  Patient name: Caleb Tejada  : 1960  MRN: [id number]  Referring provider: Paola Garcias PA-C  Dx:   Encounter Diagnosis     ICD-10-CM    1. Lumbar radiculopathy  M54.16       2. Pain of left calf  M79.662 Ambulatory Referral to Physical Therapy                     Assessment  Impairments: abnormal gait, abnormal or restricted ROM, impaired physical strength, lacks appropriate home exercise program and pain with function  Symptom irritability: low    Assessment details: Pt is a 64 y.o. male presenting to PT with chronic L calf pain without trauma or injury. PT findings include: lumbar ROM deficits, lumbar joint hypomobility, hip ROM deficits, and most significant was pain recreation into the calf with palpation of the piriformis. Repeated/sustained motions reveals pain elimination with unloaded lumbar flex, slight pain onset with lumbar extension. Pt did not have pain to activation/contraction of the calf which is inconsistent with calf strain. Also per subjective lack of overuse of excessive calf use when pain started, less likely true calf involvement. Likely lumbar radiculopathy as pain generator. He currently fits lumbar flexion preference. Pt educated on PT findings, anatomy, biomechanics, POC and rehab course. Pt will benefit from skilled PT to address above impairments to return to PLOF with less restriction and to reach functional goals.   Understanding of Dx/Px/POC: good     Prognosis: good    Goals  1. Pt will demonstrate understanding of HEP and POC in order to improve compliance with course of rehab in 2 weeks.  2. Pt will demonstrate awareness of appropriate posture with seated, standing and functional dynamic reaching activities in order to decrease excessive loads/pain with ADL's in 3 weeks.   3. Pt's pain will be 2/10 or less to allow pt to return to PLOF with least restriction by d/c.   4. Pt will maintain centralization of symptoms to allow  pt to participate in recreation without restriction by d/c.     Plan  Patient would benefit from: skilled physical therapy  Planned modality interventions: low level laser therapy    Planned therapy interventions: joint mobilization, manual therapy, neuromuscular re-education, strengthening, stretching, therapeutic activities, therapeutic exercise, home exercise program, functional ROM exercises and flexibility    Frequency: 2x week  Duration in weeks: 6  Treatment plan discussed with: patient      Subjective Evaluation    History of Present Illness  Mechanism of injury: Pt presents to PT via referral from orthopedic surgery for chronic L calf pain. Pt reports that pain has been ongoing for a couple months without injury or trauma. He denies increased activity or anything that may have strained the calf when pain started. He denies bruising or swelling into the calf. He states prior to starting voltaren, he was having a deep ache constantly. He states he was able to round his back seated that helped to ease the pain. He states difficulty sleeping, waking every hour due to pain. He denies. US ruled out DVT. Pt denies numbness/tingling in the LE.  Quality of life: good    Patient Goals  Patient goals for therapy: decreased pain, increased motion, increased strength and independence with ADLs/IADLs    Pain  Current pain ratin  At best pain ratin  At worst pain ratin  Location: L calf  Quality: dull ache, throbbing and discomfort        Objective    Flowsheet Rows      Flowsheet Row Most Recent Value   PT/OT G-Codes    Current Score 57   Projected Score 72          Lumbar ROM:  Flex: WNL  Ext: limited 50%  SB: WNL    Strength:  PF: 5/5  DF: 5/5    Lumbar mobility:  Lumbar PA: L1-S1 hypo  Lumbar UPA: L1/L2-L5/S1 hypo B    Palpation:  Tenderness mid calf  L piriformis palpation recreates pain into the L calf    Special Tests:  SLR: negative  Slump: negative    Repeated/sustained motions:  Extension: mild pain  recreation into the L calf  Unloaded Flexion: eliminates pain       Precautions: None      Manuals 7/8            Breaking bread mob R s/l             Piriformis tigertail prn                                      Neuro Re-Ed                                                                                                        Ther Ex             Cat cow HEP            LTR HEP            DKTC HEP            Piriformis stretch HEP            Libby pose HEP (may hold if knee issues)            S/l clamshell             PPT w/ hip adduction             Isometric abdominal crunch w/ PPT Maintain PPT             Lumbar flex with pball 3 way             Bike             Sciatic nerve floss             Ther Activity                                       Gait Training                                       Modalities

## 2025-07-11 ENCOUNTER — OFFICE VISIT (OUTPATIENT)
Dept: PHYSICAL THERAPY | Facility: CLINIC | Age: 65
End: 2025-07-11
Payer: COMMERCIAL

## 2025-07-11 DIAGNOSIS — M79.662 PAIN OF LEFT CALF: Primary | ICD-10-CM

## 2025-07-11 DIAGNOSIS — M54.16 LUMBAR RADICULOPATHY: ICD-10-CM

## 2025-07-11 PROCEDURE — 97110 THERAPEUTIC EXERCISES: CPT

## 2025-07-11 NOTE — PROGRESS NOTES
"Daily Note     Today's date: 2025  Patient name: Caleb Tejada  : 1960  MRN: [id number]  Referring provider: Paola Garcias PA-C  Dx:   Encounter Diagnosis     ICD-10-CM    1. Pain of left calf  M79.662       2. Lumbar radiculopathy  M54.16           Start Time: 1050  Stop Time: 1133  Total time in clinic (min): 43 minutes    Subjective: Pt reports that he's been feeling better.       Objective: See treatment diary below      Assessment: Pt tolerated treatment well. Requires verbal and tactile cueing for majority of exercises. Some discomfort noted in left calf during theraband exercises due to being on his feet for some time. A stretch reported during sciatic nerve tensioners. Will continue to progress as able.      Plan: Continue per plan of care.      Precautions: None      Manuals            Breaking bread mob R s/l             Piriformis tigertail prn            Sciatic nerve tensioner  ER                        Neuro Re-Ed                                                                                                        Ther Ex             Cat cow HEP 20x           LTR HEP 20x           Bridges w/ ball squeeze  10x5\"           Dead bugs  20x           DKTC HEP            SKTC  3x30\"ea           Piriformis stretch HEP            Libby pose HEP (may hold if knee issues)            S/l clamshell             PPT w/ hip adduction             Rows  Grn 20x           Tband rotation  Grn 10x ea           Paloff press  Blue 15x           Isometric abdominal crunch w/ PPT Maintain PPT  20x           Lumbar flex with pball 3 way  20x ea.           Bike  6'           Sciatic nerve floss             Ther Activity                                       Gait Training                                       Modalities                                            "

## 2025-07-12 DIAGNOSIS — F41.9 ANXIETY: ICD-10-CM

## 2025-07-13 RX ORDER — ESCITALOPRAM OXALATE 20 MG/1
20 TABLET ORAL DAILY
Qty: 90 TABLET | Refills: 1 | Status: SHIPPED | OUTPATIENT
Start: 2025-07-13

## 2025-07-15 ENCOUNTER — OFFICE VISIT (OUTPATIENT)
Dept: PHYSICAL THERAPY | Facility: CLINIC | Age: 65
End: 2025-07-15
Payer: COMMERCIAL

## 2025-07-15 DIAGNOSIS — M79.662 PAIN OF LEFT CALF: Primary | ICD-10-CM

## 2025-07-15 DIAGNOSIS — M54.16 LUMBAR RADICULOPATHY: ICD-10-CM

## 2025-07-15 PROCEDURE — 97110 THERAPEUTIC EXERCISES: CPT

## 2025-07-15 PROCEDURE — 97140 MANUAL THERAPY 1/> REGIONS: CPT

## 2025-07-18 ENCOUNTER — OFFICE VISIT (OUTPATIENT)
Dept: PHYSICAL THERAPY | Facility: CLINIC | Age: 65
End: 2025-07-18
Payer: COMMERCIAL

## 2025-07-18 DIAGNOSIS — M54.16 LUMBAR RADICULOPATHY: ICD-10-CM

## 2025-07-18 DIAGNOSIS — M79.662 PAIN OF LEFT CALF: Primary | ICD-10-CM

## 2025-07-18 PROCEDURE — 97140 MANUAL THERAPY 1/> REGIONS: CPT

## 2025-07-18 PROCEDURE — 97110 THERAPEUTIC EXERCISES: CPT

## 2025-07-18 NOTE — PROGRESS NOTES
"Daily Note     Today's date: 2025  Patient name: Caleb Tejada  : 1960  MRN: [id number]  Referring provider: Paola Garcias PA-C  Dx:   Encounter Diagnosis     ICD-10-CM    1. Pain of left calf  M79.662       2. Lumbar radiculopathy  M54.16                      Subjective: Pt reports he is doing okay, has some soreness but it is better than last session.       Objective: See treatment diary below      Assessment: Tolerated treatment well. Pt performed all exercises without issue, continue to progress to tolerance. Pt showed goof form with exercises throughout session, occasional cueing required. Pt would benefit from continued focus on strengthening, stability, and stretching exercises to improve overall function and decrease pain. Patient demonstrated fatigue post treatment, exhibited good technique with therapeutic exercises, and would benefit from continued PT      Plan: Continue per plan of care.      Precautions: None      Manuals 7/8 7/11 7/15 7/18         Breaking bread mob R s/l             Piriformis tigertail prn  MS KM         Sciatic nerve tensioner  ER MS                       Neuro Re-Ed                                                                                                        Ther Ex             Cat cow HEP 20x           LTR HEP 20x 10x10\" 10x10\"         Bridges w/ ball squeeze  10x5\" 15x5\" 2x10 5\"         Dead bugs  20x           DKTC HEP  3x30\" 3x30\"         SKTC  3x30\"ea           Piriformis stretch HEP            Libby pose HEP (may hold if knee issues)            S/l clamshell   Green 2x10 Green 2x10         PPT w/ hip adduction             Rows  Grn 20x Black 20x Black 20x         Tband rotation  Grn 10x ea           Paloff press  Blue 15x Blue 15x Black 2x10         Isometric abdominal crunch w/ PPT Maintain PPT  20x           Lumbar flex with pball 3 way  20x ea. 5x10\" 5x10\" ea         Bike  6' L3x8' L3 8'         Sciatic nerve floss   20x5\" 20x5\"         Ther " Activity                                       Gait Training                                       Modalities

## 2025-07-21 ENCOUNTER — OFFICE VISIT (OUTPATIENT)
Dept: PHYSICAL THERAPY | Facility: CLINIC | Age: 65
End: 2025-07-21
Payer: COMMERCIAL

## 2025-07-21 ENCOUNTER — PATIENT MESSAGE (OUTPATIENT)
Dept: GASTROENTEROLOGY | Facility: CLINIC | Age: 65
End: 2025-07-21

## 2025-07-21 DIAGNOSIS — M79.662 PAIN OF LEFT CALF: Primary | ICD-10-CM

## 2025-07-21 DIAGNOSIS — M54.16 LUMBAR RADICULOPATHY: ICD-10-CM

## 2025-07-21 PROCEDURE — 97140 MANUAL THERAPY 1/> REGIONS: CPT

## 2025-07-21 PROCEDURE — 97110 THERAPEUTIC EXERCISES: CPT

## 2025-07-21 NOTE — PROGRESS NOTES
"Daily Note     Today's date: 2025  Patient name: Caleb Tejada  : 1960  MRN: [id number]  Referring provider: Paola Garcias PA-C  Dx:   Encounter Diagnosis     ICD-10-CM    1. Pain of left calf  M79.662       2. Lumbar radiculopathy  M54.16                      Subjective: Pt was able to mow his grass this morning without any pain.       Objective: See treatment diary below      Assessment: Pt is progressing well, will continue to benefit from skilled PT to increase strength and endurance.       Plan: Continue per plan of care.      Precautions: None      Manuals 7/8 7/11 7/15 7/18 7/21        Breaking bread mob R s/l             Piriformis tigertail prn  MS KM HA        Sciatic nerve tensioner  ER MS                       Neuro Re-Ed                                                                                                        Ther Ex             Cat cow HEP 20x           LTR HEP 20x 10x10\" 10x10\" 10\" x10        Bridges w/ ball squeeze  10x5\" 15x5\" 2x10 5\" 2x10 5\"        Dead bugs  20x           DKTC HEP  3x30\" 3x30\" 3x30\"         SKTC  3x30\"ea           Piriformis stretch HEP            Libby pose HEP (may hold if knee issues)            S/l clamshell   Green 2x10 Green 2x10 Green 2x10        PPT w/ hip adduction             Rows  Grn 20x Black 20x Black 20x Black 20x        Tband rotation  Grn 10x ea           Paloff press  Blue 15x Blue 15x Black 2x10 Black 2x10        Isometric abdominal crunch w/ PPT Maintain PPT  20x           Lumbar flex with pball 3 way  20x ea. 5x10\" 5x10\" ea 5x10\"        Bike  6' L3x8' L3 8' L3 x8'        Sciatic nerve floss   20x5\" 20x5\" 20x5\"        Ther Activity                                       Gait Training                                       Modalities                                                  "

## 2025-07-22 DIAGNOSIS — M79.662 PAIN OF LEFT CALF: ICD-10-CM

## 2025-07-23 ENCOUNTER — OFFICE VISIT (OUTPATIENT)
Dept: PHYSICAL THERAPY | Facility: CLINIC | Age: 65
End: 2025-07-23
Payer: COMMERCIAL

## 2025-07-23 DIAGNOSIS — M79.662 PAIN OF LEFT CALF: Primary | ICD-10-CM

## 2025-07-23 DIAGNOSIS — M54.16 LUMBAR RADICULOPATHY: ICD-10-CM

## 2025-07-23 PROCEDURE — 97110 THERAPEUTIC EXERCISES: CPT | Performed by: PHYSICAL THERAPIST

## 2025-07-23 PROCEDURE — 97140 MANUAL THERAPY 1/> REGIONS: CPT | Performed by: PHYSICAL THERAPIST

## 2025-07-23 NOTE — PROGRESS NOTES
"Daily Note     Today's date: 2025  Patient name: Caleb Tejada  : 1960  MRN: [id number]  Referring provider: Paola Garcias PA-C  Dx:   Encounter Diagnosis     ICD-10-CM    1. Pain of left calf  M79.662       2. Lumbar radiculopathy  M54.16           Start Time: 1440  Stop Time: 1518  Total time in clinic (min): 38 minutes    Subjective: Pt reports he is 'fine' and nothing seems to cause him pain anymore. If a movement does, then it is a 1/10.       Objective: See treatment diary below      Assessment: Pt is progressing well, and no symptoms or pain were elicited during treatment. Required verbal cueing to incorporate flexion and extension of head during sciatic nerve flosses. F/u with ortho .      Plan: Continue per plan of care.     Precautions: None      Manuals 7/8 7/11 7/15 7/18 7/21 7/23       Breaking bread mob R s/l             Piriformis tigertail prn  MS KM HA ER       Sciatic nerve tensioner  ER MS                       Neuro Re-Ed                                                                                                        Ther Ex             Cat cow HEP 20x           LTR HEP 20x 10x10\" 10x10\" 10\" x10 10x10\"       Bridges w/ ball squeeze  10x5\" 15x5\" 2x10 5\" 2x10 5\" 2x10 5\"       Dead bugs  20x           DKTC HEP  3x30\" 3x30\" 3x30\"  3x30\"       SKTC  3x30\"ea           Piriformis stretch HEP            Libby pose HEP (may hold if knee issues)            S/l clamshell   Green 2x10 Green 2x10 Green 2x10 Green 2x10 ea       PPT w/ hip adduction             Rows  Grn 20x Black 20x Black 20x Black 20x Black 20x       Tband rotation  Grn 10x ea           Paloff press  Blue 15x Blue 15x Black 2x10 Black 2x10 Black 2x10       Isometric abdominal crunch w/ PPT Maintain PPT  20x           Lumbar flex with pball 3 way  20x ea. 5x10\" 5x10\" ea 5x10\" 5x10\" ea       Bike  6' L3x8' L3 8' L3 x8' L3 8'       Sciatic nerve floss   20x5\" 20x5\" 20x5\" 20x5\"       Ther Activity                   "                     Gait Training                                       Modalities

## 2025-08-07 ENCOUNTER — OFFICE VISIT (OUTPATIENT)
Age: 65
End: 2025-08-07
Payer: COMMERCIAL

## 2025-08-07 DIAGNOSIS — M79.662 PAIN OF LEFT CALF: Primary | ICD-10-CM

## 2025-08-07 PROCEDURE — 99213 OFFICE O/P EST LOW 20 MIN: CPT | Performed by: ORTHOPAEDIC SURGERY

## 2025-08-08 ENCOUNTER — OFFICE VISIT (OUTPATIENT)
Dept: PHYSICAL THERAPY | Facility: CLINIC | Age: 65
End: 2025-08-08
Payer: COMMERCIAL

## 2025-08-08 DIAGNOSIS — M79.662 PAIN OF LEFT CALF: Primary | ICD-10-CM

## 2025-08-08 DIAGNOSIS — M54.16 LUMBAR RADICULOPATHY: ICD-10-CM

## 2025-08-08 PROCEDURE — 97110 THERAPEUTIC EXERCISES: CPT

## 2025-08-08 PROCEDURE — 97140 MANUAL THERAPY 1/> REGIONS: CPT

## 2025-08-12 ENCOUNTER — ANESTHESIA EVENT (OUTPATIENT)
Dept: ANESTHESIOLOGY | Facility: HOSPITAL | Age: 65
End: 2025-08-12

## 2025-08-12 ENCOUNTER — ANESTHESIA (OUTPATIENT)
Dept: ANESTHESIOLOGY | Facility: HOSPITAL | Age: 65
End: 2025-08-12